# Patient Record
Sex: MALE | Race: WHITE | NOT HISPANIC OR LATINO | Employment: OTHER | URBAN - METROPOLITAN AREA
[De-identification: names, ages, dates, MRNs, and addresses within clinical notes are randomized per-mention and may not be internally consistent; named-entity substitution may affect disease eponyms.]

---

## 2017-02-28 ENCOUNTER — PATIENT OUTREACH (OUTPATIENT)
Dept: HEALTH INFORMATION MANAGEMENT | Facility: OTHER | Age: 77
End: 2017-02-28

## 2017-03-01 NOTE — PROGRESS NOTES
Attempt #:1     Annual Wellness Visit Scheduling  1. Scheduling Status:Scheduled          Care Gap Scheduling (Attempt to Schedule EACH Overdue Care Gap!)     Health Maintenance Due   Topic Date Due   • IMM DTaP/Tdap/Td Vaccine (1 - Tdap) SCHEDULED   • IMM ZOSTER VACCINE  SCHEDULED   • Annual Wellness Visit  SCHEDULED         MyChart Activation: already active

## 2017-03-13 ENCOUNTER — OFFICE VISIT (OUTPATIENT)
Dept: MEDICAL GROUP | Facility: MEDICAL CENTER | Age: 77
End: 2017-03-13
Payer: MEDICARE

## 2017-03-13 VITALS
TEMPERATURE: 98 F | WEIGHT: 199 LBS | SYSTOLIC BLOOD PRESSURE: 126 MMHG | HEIGHT: 74 IN | DIASTOLIC BLOOD PRESSURE: 82 MMHG | OXYGEN SATURATION: 95 % | HEART RATE: 78 BPM | BODY MASS INDEX: 25.54 KG/M2

## 2017-03-13 DIAGNOSIS — M25.551 BILATERAL HIP PAIN: ICD-10-CM

## 2017-03-13 DIAGNOSIS — Z95.0 BIVENTRICULAR CARDIAC PACEMAKER IN SITU: ICD-10-CM

## 2017-03-13 DIAGNOSIS — F51.01 PRIMARY INSOMNIA: ICD-10-CM

## 2017-03-13 DIAGNOSIS — I42.9 CARDIOMYOPATHY (HCC): ICD-10-CM

## 2017-03-13 DIAGNOSIS — Z00.00 MEDICARE ANNUAL WELLNESS VISIT, SUBSEQUENT: ICD-10-CM

## 2017-03-13 DIAGNOSIS — Z98.890 S/P THORACIC AORTIC ANEURYSM REPAIR: ICD-10-CM

## 2017-03-13 DIAGNOSIS — I10 ESSENTIAL HYPERTENSION, BENIGN: ICD-10-CM

## 2017-03-13 DIAGNOSIS — Z86.79 S/P THORACIC AORTIC ANEURYSM REPAIR: ICD-10-CM

## 2017-03-13 DIAGNOSIS — N18.30 CKD (CHRONIC KIDNEY DISEASE) STAGE 3, GFR 30-59 ML/MIN (HCC): ICD-10-CM

## 2017-03-13 DIAGNOSIS — I44.0 FIRST DEGREE ATRIOVENTRICULAR BLOCK: ICD-10-CM

## 2017-03-13 DIAGNOSIS — I49.3 PVCS (PREMATURE VENTRICULAR CONTRACTIONS): ICD-10-CM

## 2017-03-13 DIAGNOSIS — M25.552 BILATERAL HIP PAIN: ICD-10-CM

## 2017-03-13 DIAGNOSIS — Z85.828 H/O NONMELANOMA SKIN CANCER: ICD-10-CM

## 2017-03-13 DIAGNOSIS — J30.1 SEASONAL ALLERGIC RHINITIS DUE TO POLLEN: ICD-10-CM

## 2017-03-13 DIAGNOSIS — Z95.2 S/P AVR (AORTIC VALVE REPLACEMENT): ICD-10-CM

## 2017-03-13 DIAGNOSIS — R73.01 ELEVATED FASTING BLOOD SUGAR: ICD-10-CM

## 2017-03-13 DIAGNOSIS — Z85.46 H/O PROSTATE CANCER: ICD-10-CM

## 2017-03-13 DIAGNOSIS — I44.7 LBBB (LEFT BUNDLE BRANCH BLOCK): ICD-10-CM

## 2017-03-13 DIAGNOSIS — K21.9 GASTROESOPHAGEAL REFLUX DISEASE WITHOUT ESOPHAGITIS: ICD-10-CM

## 2017-03-13 DIAGNOSIS — R73.03 PREDIABETES: ICD-10-CM

## 2017-03-13 PROCEDURE — G0439 PPPS, SUBSEQ VISIT: HCPCS | Performed by: FAMILY MEDICINE

## 2017-03-13 PROCEDURE — G8510 SCR DEP NEG, NO PLAN REQD: HCPCS | Performed by: FAMILY MEDICINE

## 2017-03-13 PROCEDURE — 1036F TOBACCO NON-USER: CPT | Performed by: FAMILY MEDICINE

## 2017-03-13 ASSESSMENT — PATIENT HEALTH QUESTIONNAIRE - PHQ9: CLINICAL INTERPRETATION OF PHQ2 SCORE: 0

## 2017-03-13 NOTE — PROGRESS NOTES
Chief Complaint   Patient presents with   • Annual Exam         HPI:  Ross is a 77 y.o. here for Medicare Annual Wellness Visit     Patient Active Problem List    Diagnosis Date Noted   • H/O nonmelanoma skin cancer 11/27/2013     Priority: High   • H/O prostate cancer 03/13/2017   • Essential hypertension, benign 09/15/2016   • Cardiomyopathy (CMS-HCC) 09/01/2015   • CKD (chronic kidney disease) stage 3, GFR 30-59 ml/min 08/10/2015   • Bilateral hip pain 08/10/2015   • Biventricular cardiac pacemaker in situ 03/04/2015   • PVCs (premature ventricular contractions) 03/04/2015   • Prediabetes 02/12/2015   • First degree atrioventricular block 12/29/2014   • Seasonal allergies 10/17/2013   • GERD (gastroesophageal reflux disease) 10/17/2013   • Insomnia 10/17/2013   • LBBB (left bundle branch block) 05/08/2013   • S/P AVR (aortic valve replacement) 05/08/2013   • S/P thoracic aortic aneurysm repair 05/08/2013       Current Outpatient Prescriptions   Medication Sig Dispense Refill   • losartan (COZAAR) 50 MG Tab Take 1 Tab by mouth every day. 90 Tab 3   • omeprazole (PRILOSEC) 20 MG delayed-release capsule Take 1 Cap by mouth every day. 90 Cap 3   • metoprolol SR (TOPROL XL) 25 MG TABLET SR 24 HR Take 1 Tab by mouth ONE-HALF HOUR AFTER DINNER. 90 Tab 3   • fluticasone (FLONASE) 50 MCG/ACT nasal spray Spray 2 Sprays in nose every day. 1 Bottle 0   • Cholecalciferol (VITAMIN D) 400 UNIT TABS Take 400 Units by mouth every day.     • DiphenhydrAMINE HCl (BENADRYL ALLERGY PO) Take  by mouth as needed. As needed for sleep     • aspirin 81 MG tablet Take 81 mg by mouth every day.       No current facility-administered medications for this visit.            Current supplements as per medication list.       Allergies: Review of patient's allergies indicates no known allergies.    Current social contact/activities: Lives with wife. Son in [place] and daughter in [place].     He  reports that he has never smoked. He has never  used smokeless tobacco. He reports that he drinks about 2.5 oz of alcohol per week. He reports that he does not use illicit drugs.  Counseling given: Not Answered        DPA/Advanced directive: Patient does have an advanced directive. If not on file, instructed to bring in a copy to scan into their chart. If no advanced directive exists, a packet and workshop information was given on advanced directives.     ROS:    Gait: Uses no assistive device   Ostomy: no   Other tubes: no   Amputations: no   Chronic oxygen use no   Last eye exam: 1 month ago.   : Denies incontinence.       Depression Screening    Little interest or pleasure in doing things?  0 - not at all  Feeling down, depressed , or hopeless? 0 - not at all  Trouble falling or staying asleep, or sleeping too much?     Feeling tired or having little energy?     Poor appetite or overeating?     Feeling bad about yourself - or that you are a failure or have let yourself or your family down?    Trouble concentrating on things, such as reading the newspaper or watching television?    Moving or speaking so slowly that other people could have noticed.  Or the opposite - being so fidgety or restless that you have been moving around a lot more than usual?     Thoughts that you would be better off dead, or of hurting yourself?     Patient Health Questionnaire Score:      If depressive symptoms identified deferred to follow up visit unless specifically addressed in assesment and plan.      Screening for Cognitive Impairment    Three Minute Recall (banana, sunrise, fence)  3/3    Draw clock face with all 12 numbers set to the hand to show 10 minures past 11 o'clock  1    Cognitive concerns identified defferred for follow up unless specifically addressed in assesment and plan.    Fall Risk Assessment    Has the patient had two or more falls in the last year or any fall with injury in the last year?  No    Safety Assessment    Throw rugs on floor.  No  Handrails on all  stairs.  No  Good lighting in all hallways.  Yes  Difficulty hearing.  No  Patient counseled about all safety risks that were identified.    Functional Assessment ADLs    Are there any barriers preventing you from cooking for yourself or meeting nutritional needs?  No.    Are there any barriers preventing you from driving safely or obtaining transportation?  No.    Are there any barriers preventing you from using a telephone or calling for help?  No.    Are there any barriers preventing you from shopping?  No.    Are there any barriers preventing you from taking care of your own finances?  No.    Are there any barriers preventing you from managing your medications?  No.    Are currently engaing any exercise or physical activity?  Yes.  Walking    Health Maintenance Summary                IMM DTaP/Tdap/Td Vaccine Overdue 3/8/1958     IMM ZOSTER VACCINE Overdue 3/8/1999     Annual Wellness Visit Overdue 2016      Done 2015 Visit Dx: Medicare annual wellness visit, subsequent    COLONOSCOPY Next Due 2017      Done 2012 AMB REFERRAL TO GI FOR COLONOSCOPY          Patient Care Team:  Scottie Packer M.D. as PCP - General (Family Medicine)      Social History   Substance Use Topics   • Smoking status: Never Smoker    • Smokeless tobacco: Never Used   • Alcohol Use: 2.5 oz/week     5 Glasses of wine per week     Family History   Problem Relation Age of Onset   • Other Mother       at age 82. Parkinson's disease.   • Cancer Father       at age 68. Prostate cancer.   • Cancer Sister      Lung     He  has a past medical history of Hypertension ( ); LBBB (left bundle branch block) ( ); Prostate cancer (CMS-[facility]) ( ); S/P AVR (aortic valve replacement) (2000); S/P AVR (aortic valve replacement) (2013); S/P thoracic aortic aneurysm repair (2013); S/P prostatectomy (); Seasonal allergies (10/17/2013); GERD (gastroesophageal reflux disease) (10/17/2013); Insomnia (10/17/2013); Basal cell  "carcinoma of lip (11/27/2013); Heart burn; Indigestion; Bronchitis; Pneumonia; Breath shortness; Cancer (CMS-HCC) (2006); Cold (12/2014); PVCs (premature ventricular contractions) (3/4/2015); Positional lightheadedness (3/4/2015); Biventricular cardiac pacemaker in situ (3/4/2015); and MEDICAL HOME.   Past Surgical History   Procedure Laterality Date   • Other cardiac surgery  2000 and 2013     aVR. Thoracic aortic aneurysm repair.   • Prostatectomy, radial  2003.     prostate cancer.   • Appendectomy     • Aortic valve replacement  2/21/13     Bovine tissur valve with ascending aortic repair..  Done in [place].   • Cardiac cath  2/2013     normal coronaries prior to AVR.   • Other  2013     carcinoma removed from lip   • Recovery  12/29/2014     Performed by Cath-Recovery Surgery at SURGERY SAME DAY [facility] ORS       Exam:     Blood pressure 126/82, pulse 78, temperature 36.7 °C (98 °F), height 1.885 m (6' 2.21\"), weight 90.266 kg (199 lb), SpO2 95 %. Body mass index is 25.4 kg/(m^2).    Constitutional: Alert, no distress.  Skin: Warm, dry, good turgor, no rashes in visible areas.  Eye: Equal, round and reactive, conjunctiva clear, lids normal.  ENMT: Lips without lesions, good dentition, oropharynx clear.  Neck: Trachea midline, no masses, no thyromegaly. No cervical or supraclavicular lymphadenopathy  Respiratory: Unlabored respiratory effort, lungs clear to auscultation, no wheezes, no ronchi.  Cardiovascular: Normal S1, S2, no murmur, no edema.  Psych: Alert and oriented x3, normal affect and mood.        Assessment and Plan. The following treatment and monitoring plan is recommended:    1. Medicare annual wellness visit, subsequent  - Annual Wellness Visit - Includes PPPS Subsequent ()    2. LBBB (left bundle branch block)  Asymptomatic. Continue to follow up with cardiology.  - Annual Wellness Visit - Includes PPPS Subsequent ()    3. S/P AVR (aortic valve replacement)  Asymptomatic. Continue to " follow up with cardiology.  - Annual Wellness Visit - Includes PPPS Subsequent ()    4. S/P thoracic aortic aneurysm repair  Asymptomatic. Continue to follow up with cardiology.  - Annual Wellness Visit - Includes PPPS Subsequent ()    5. First degree atrioventricular block  Asymptomatic. Continue to follow up with cardiology.  - Annual Wellness Visit - Includes PPPS Subsequent ()    6. Biventricular cardiac pacemaker in situ  Asymptomatic. Continue to follow up with cardiology.  - Annual Wellness Visit - Includes PPPS Subsequent ()    7. PVCs (premature ventricular contractions)  Asymptomatic. Continue to follow up with cardiology.  - Annual Wellness Visit - Includes PPPS Subsequent ()    8. Cardiomyopathy (CMS-HCC)  Asymptomatic. Continue to follow up with cardiology.  - Annual Wellness Visit - Includes PPPS Subsequent ()    9. Essential hypertension, benign  Controlled. Continue current medication.  - CBC WITH DIFFERENTIAL; Future  - COMP METABOLIC PANEL; Future  - LIPID PROFILE; Future  - TSH WITH REFLEX TO FT4; Future  - Annual Wellness Visit - Includes PPPS Subsequent ()    10. Seasonal allergic rhinitis due to pollen  Controlled. Continue current medication.  - Annual Wellness Visit - Includes PPPS Subsequent ()    11. Gastroesophageal reflux disease without esophagitis  Controlled. Continue current medication.  - Annual Wellness Visit - Includes PPPS Subsequent ()    12. Primary insomnia  Controlled. Continue Benadryl  - Annual Wellness Visit - Includes PPPS Subsequent ()    13. H/O nonmelanoma skin cancer  No recurrence.  - Annual Wellness Visit - Includes PPPS Subsequent ()    14. Prediabetes  Stable. Advised healthy lifestyle.  - Annual Wellness Visit - Includes PPPS Subsequent ()    15. CKD (chronic kidney disease) stage 3, GFR 30-59 ml/min  Stable. Follow-up labs in 6 months and call with results.  - VITAMIN D,25 HYDROXY; Future  - Annual  Wellness Visit - Includes PPPS Subsequent ()    16. Bilateral hip pain  Stable and minimally symptomatic. Continue to monitor.  - Annual Wellness Visit - Includes PPPS Subsequent ()    17. H/O prostate cancer  - Annual Wellness Visit - Includes PPPS Subsequent ()    18. Elevated fasting blood sugar  Stable. Follow-up labs in 6 months and call with results.  - HEMOGLOBIN A1C; Future  - Annual Wellness Visit - Includes PPPS Subsequent ()        Services needed: as per orders if indicated.  Health Care Screening: Age-appropriate preventive services Medicare covers discussed today and ordered if indicated.    Referrals offered: Community-based lifestyle interventions to reduce health risks and promote self-management and wellness, fall prevention, nutrition, physical activity, tobacco-use cessation, weight loss, and mental health services as per orders if indicated.    Discussion today about general wellness and lifestyle habits:    · Prevent falls and reduce trip hazards; Cautioned about securing or removing rugs.  · Have a working fire alarm and carbon monoxide detector;   · Engage in regular physical activity and social activities       Follow-up: Return in about 1 year (around 3/13/2018), or if symptoms worsen or fail to improve, for Annual, Long.

## 2017-03-13 NOTE — MR AVS SNAPSHOT
"        Ross Mcneal   3/13/2017 3:20 PM   Office Visit   MRN: [id number]    Department:  [facility]   Dept Phone:  [phone]    Description:  Male : 1940   Provider:  Scottie Packer M.D.           Reason for Visit     Annual Exam           Allergies as of 3/13/2017     No Known Allergies      You were diagnosed with     Medicare annual wellness visit, subsequent   [878793]       LBBB (left bundle branch block)   [097420]       S/P AVR (aortic valve replacement)   [525705]       S/P thoracic aortic aneurysm repair   [663801]       First degree atrioventricular block   [426.11.ICD-9-CM]       Biventricular cardiac pacemaker in situ   [847270]       PVCs (premature ventricular contractions)   [067305]       Cardiomyopathy (CMS-HCC)   [2179981]       Essential hypertension, benign   [401.1.ICD-9-CM]       Seasonal allergic rhinitis due to pollen   [4764632]       Gastroesophageal reflux disease without esophagitis   [511122]       Primary insomnia   [484123]       H/O nonmelanoma skin cancer   [783618]       Prediabetes   [222663]       CKD (chronic kidney disease) stage 3, GFR 30-59 ml/min   [058919]       Bilateral hip pain   [237533]       H/O prostate cancer   [796900]       Elevated fasting blood sugar   [517868]         Vital Signs     Blood Pressure Pulse Temperature Height Weight Body Mass Index    126/82 mmHg 78 36.7 °C (98 °F) 1.885 m (6' 2.21\") 90.266 kg (199 lb) 25.40 kg/m2    Oxygen Saturation Smoking Status                95% Never Smoker           Basic Information     Date Of Birth Sex Race Ethnicity Preferred Language    1940 Male White Non- English      Your appointments     Mar 16, 2017  9:15 AM   PACER CHECK ONLY with PACER CHECK-[facility]   [facility] (--)    [address], [address]  [place] [id number] [phone]            Mar 16, 2017 10:00 AM   FOLLOW UP with Rashard Conte M.D.   [facility] (--)    [address], [address]  [place] [address] [phone]              Problem List              ICD-10-CM Priority Class Noted - Resolved    LBBB (left bundle branch block) I44.7   5/8/2013 - Present    S/P AVR (aortic valve replacement) Z95.2   5/8/2013 - Present    S/P thoracic aortic aneurysm repair Z98.890, Z86.79   5/8/2013 - Present    Seasonal allergies J30.2   10/17/2013 - Present    GERD (gastroesophageal reflux disease) K21.9   10/17/2013 - Present    Insomnia G47.00   10/17/2013 - Present    H/O nonmelanoma skin cancer Z85.828 High  11/27/2013 - Present    First degree atrioventricular block I44.0   12/29/2014 - Present    Prediabetes R73.03   2/12/2015 - Present    Biventricular cardiac pacemaker in situ Z95.0   3/4/2015 - Present    PVCs (premature ventricular contractions) I49.3   3/4/2015 - Present    CKD (chronic kidney disease) stage 3, GFR 30-59 ml/min N18.3   8/10/2015 - Present    Bilateral hip pain M25.551, M25.552   8/10/2015 - Present    Cardiomyopathy (CMS-HCC) I42.9   9/1/2015 - Present    Essential hypertension, benign I10   9/15/2016 - Present    H/O prostate cancer Z85.46   3/13/2017 - Present      Health Maintenance        Date Due Completion Dates    IMM DTaP/Tdap/Td Vaccine (1 - Tdap) 3/8/1958 ---    IMM ZOSTER VACCINE 3/8/1999 ---    COLONOSCOPY 12/4/2017 12/4/2012            Current Immunizations     13-VALENT PCV PREVNAR 7/11/2016    Influenza TIV (IM) 10/12/2016, 10/15/2014    Pneumococcal polysaccharide vaccine (PPSV-23) 12/29/2010      Below and/or attached are the medications your provider expects you to take. Review all of your home medications and newly ordered medications with your provider and/or pharmacist. Follow medication instructions as directed by your provider and/or pharmacist. Please keep your medication list with you and share with your provider. Update the information when medications are discontinued, doses are changed, or new  medications (including over-the-counter products) are added; and carry medication information at all times in the event of emergency situations     Allergies:  No Known Allergies          Medications  Valid as of: March 13, 2017 -  3:34 PM    Generic Name Brand Name Tablet Size Instructions for use    Aspirin (Tab) aspirin 81 MG Take 81 mg by mouth every day.        Cholecalciferol (Tab) VITAMIN D 400 UNIT Take 400 Units by mouth every day.        DiphenhydrAMINE HCl   Take  by mouth as needed. As needed for sleep        Fluticasone Propionate (Suspension) FLONASE 50 MCG/ACT Spray 2 Sprays in nose every day.        Losartan Potassium (Tab) COZAAR 50 MG Take 1 Tab by mouth every day.        Metoprolol Succinate (TABLET SR 24 HR) TOPROL XL 25 MG Take 1 Tab by mouth ONE-HALF HOUR AFTER DINNER.        Omeprazole (CAPSULE DELAYED RELEASE) PRILOSEC 20 MG Take 1 Cap by mouth every day.        .                 Medicines prescribed today were sent to:     [facility] - [address] [facility] [address]    Phone: [phone] Fax: [phone]    Open 24 Hours?: No    [facility] [address] [place] [address] AT [address] [address]    Phone: [phone] Fax: [phone]    Open 24 Hours?: No      Medication refill instructions:       If your prescription bottle indicates you have medication refills left, it is not necessary to call your provider’s office. Please contact your pharmacy and they will refill your medication.    If your prescription bottle indicates you do not have any refills left, you may request refills at any time through one of the following ways: The online [employer] system (except Urgent Care), by calling your provider’s office, or by asking your pharmacy to contact your provider’s office with a refill request. Medication refills are processed only during regular  business hours and may not be available until the next business day. Your provider may request additional information or to have a follow-up visit with you prior to refilling your medication.   *Please Note: Medication refills are assigned a new Rx number when refilled electronically. Your pharmacy may indicate that no refills were authorized even though a new prescription for the same medication is available at the pharmacy. Please request the medicine by name with the pharmacy before contacting your provider for a refill.        Your To Do List     Future Labs/Procedures Complete By Expires    CBC WITH DIFFERENTIAL  As directed 3/14/2018    COMP METABOLIC PANEL  As directed 3/14/2018    HEMOGLOBIN A1C  As directed 3/14/2018    LIPID PROFILE  As directed 3/14/2018    TSH WITH REFLEX TO FT4  As directed 3/13/2018    VITAMIN D,25 HYDROXY  As directed 3/14/2018      Other Notes About Your Plan     Enrolled in RNEXAGEMed Team with Dr. Birdie May Access Code: Activation code not generated  Current [employer] Status: Active

## 2017-03-16 ENCOUNTER — OFFICE VISIT (OUTPATIENT)
Dept: CARDIOLOGY | Facility: MEDICAL CENTER | Age: 77
End: 2017-03-16
Payer: MEDICARE

## 2017-03-16 ENCOUNTER — NON-PROVIDER VISIT (OUTPATIENT)
Dept: CARDIOLOGY | Facility: MEDICAL CENTER | Age: 77
End: 2017-03-16
Payer: MEDICARE

## 2017-03-16 VITALS
HEIGHT: 74 IN | BODY MASS INDEX: 25.03 KG/M2 | WEIGHT: 195 LBS | DIASTOLIC BLOOD PRESSURE: 62 MMHG | SYSTOLIC BLOOD PRESSURE: 130 MMHG | HEART RATE: 79 BPM | OXYGEN SATURATION: 92 %

## 2017-03-16 DIAGNOSIS — I34.1 MVP (MITRAL VALVE PROLAPSE): ICD-10-CM

## 2017-03-16 DIAGNOSIS — I42.9 CARDIOMYOPATHY (HCC): ICD-10-CM

## 2017-03-16 DIAGNOSIS — Z98.890 S/P THORACIC AORTIC ANEURYSM REPAIR: ICD-10-CM

## 2017-03-16 DIAGNOSIS — Z86.79 S/P THORACIC AORTIC ANEURYSM REPAIR: ICD-10-CM

## 2017-03-16 DIAGNOSIS — I34.0 MITRAL VALVE INSUFFICIENCY, UNSPECIFIED ETIOLOGY: ICD-10-CM

## 2017-03-16 DIAGNOSIS — Z95.0 BIVENTRICULAR CARDIAC PACEMAKER IN SITU: ICD-10-CM

## 2017-03-16 DIAGNOSIS — Z95.2 S/P AVR (AORTIC VALVE REPLACEMENT): ICD-10-CM

## 2017-03-16 PROCEDURE — 93281 PM DEVICE PROGR EVAL MULTI: CPT | Performed by: INTERNAL MEDICINE

## 2017-03-16 PROCEDURE — 1036F TOBACCO NON-USER: CPT | Performed by: INTERNAL MEDICINE

## 2017-03-16 PROCEDURE — G8420 CALC BMI NORM PARAMETERS: HCPCS | Performed by: INTERNAL MEDICINE

## 2017-03-16 PROCEDURE — G8432 DEP SCR NOT DOC, RNG: HCPCS | Performed by: INTERNAL MEDICINE

## 2017-03-16 PROCEDURE — 4040F PNEUMOC VAC/ADMIN/RCVD: CPT | Performed by: INTERNAL MEDICINE

## 2017-03-16 PROCEDURE — 99214 OFFICE O/P EST MOD 30 MIN: CPT | Mod: 25 | Performed by: INTERNAL MEDICINE

## 2017-03-16 PROCEDURE — 1101F PT FALLS ASSESS-DOCD LE1/YR: CPT | Performed by: INTERNAL MEDICINE

## 2017-03-16 PROCEDURE — G8482 FLU IMMUNIZE ORDER/ADMIN: HCPCS | Performed by: INTERNAL MEDICINE

## 2017-03-16 ASSESSMENT — ENCOUNTER SYMPTOMS
PND: 0
BRUISES/BLEEDS EASILY: 0
SHORTNESS OF BREATH: 0
LOSS OF CONSCIOUSNESS: 0
ORTHOPNEA: 0
PALPITATIONS: 0
MYALGIAS: 0
DIZZINESS: 0

## 2017-03-16 NOTE — MR AVS SNAPSHOT
"        Ross Mcneal   3/16/2017 10:00 AM   Office Visit   MRN: [id number]    Department:  [facility]   Dept Phone:  [phone]    Description:  Male : 1940   Provider:  Rashard Conte M.D.           Reason for Visit     Follow-Up           Allergies as of 3/16/2017     No Known Allergies      You were diagnosed with     S/P AVR (aortic valve replacement)   [948522]       S/P thoracic aortic aneurysm repair   [537402]       Cardiomyopathy (CMS-HCC)   [1697591]       Biventricular cardiac pacemaker in situ   [029342]       CKD (chronic kidney disease) stage 3, GFR 30-59 ml/min   [807032]       MVP (mitral valve prolapse)   [651519]       Mitral valve insufficiency, unspecified etiology   [3722147]         Vital Signs     Blood Pressure Pulse Height Weight Body Mass Index Oxygen Saturation    130/62 mmHg 79 1.885 m (6' 2.21\") 88.451 kg (195 lb) 24.89 kg/m2 92%    Smoking Status                   Never Smoker            Basic Information     Date Of Birth Sex Race Ethnicity Preferred Language    1940 Male White Non- English      Problem List              ICD-10-CM Priority Class Noted - Resolved    LBBB (left bundle branch block) I44.7   2013 - Present    S/P AVR (aortic valve replacement) Z95.2   2013 - Present    S/P thoracic aortic aneurysm repair Z98.890, Z86.79   2013 - Present    Seasonal allergies J30.2   10/17/2013 - Present    GERD (gastroesophageal reflux disease) K21.9   10/17/2013 - Present    Insomnia G47.00   10/17/2013 - Present    H/O nonmelanoma skin cancer Z85.828 High  2013 - Present    First degree atrioventricular block I44.0   2014 - Present    Prediabetes R73.03   2015 - Present    Biventricular cardiac pacemaker in situ Z95.0   3/4/2015 - Present    PVCs (premature ventricular contractions) I49.3   3/4/2015 - Present    CKD (chronic kidney disease) stage 3, GFR 30-59 ml/min N18.3   8/10/2015 - Present    Bilateral hip pain " M25.551, M25.552   8/10/2015 - Present    Cardiomyopathy (CMS-HCC) I42.9   9/1/2015 - Present    Essential hypertension, benign I10   9/15/2016 - Present    H/O prostate cancer Z85.46   3/13/2017 - Present    MVP (mitral valve prolapse) I34.1   3/16/2017 - Present    Mitral regurgitation I34.0   3/16/2017 - Present      Health Maintenance        Date Due Completion Dates    IMM DTaP/Tdap/Td Vaccine (1 - Tdap) 3/8/1958 ---    IMM ZOSTER VACCINE 3/8/1999 ---    COLONOSCOPY 12/4/2017 12/4/2012            Current Immunizations     13-VALENT PCV PREVNAR 7/11/2016    Influenza TIV (IM) 10/12/2016, 10/15/2014    Pneumococcal polysaccharide vaccine (PPSV-23) 12/29/2010      Below and/or attached are the medications your provider expects you to take. Review all of your home medications and newly ordered medications with your provider and/or pharmacist. Follow medication instructions as directed by your provider and/or pharmacist. Please keep your medication list with you and share with your provider. Update the information when medications are discontinued, doses are changed, or new medications (including over-the-counter products) are added; and carry medication information at all times in the event of emergency situations     Allergies:  No Known Allergies          Medications  Valid as of: March 16, 2017 - 10:28 AM    Generic Name Brand Name Tablet Size Instructions for use    Aspirin (Tab) aspirin 81 MG Take 81 mg by mouth every day.        Cetirizine HCl (Cap) Cetirizine HCl 10 MG Take  by mouth.        Cholecalciferol (Tab) VITAMIN D 400 UNIT Take 400 Units by mouth every day.        DiphenhydrAMINE HCl   Take  by mouth as needed. As needed for sleep        Fluticasone Propionate (Suspension) FLONASE 50 MCG/ACT Spray 2 Sprays in nose every day.        Losartan Potassium (Tab) COZAAR 50 MG Take 1 Tab by mouth every day.        Metoprolol Succinate (TABLET SR 24 HR) TOPROL XL 25 MG Take 1 Tab by mouth ONE-HALF HOUR AFTER  DINNER.        Omeprazole (CAPSULE DELAYED RELEASE) PRILOSEC 20 MG Take 1 Cap by mouth every day.        .                 Medicines prescribed today were sent to:     [facility] - [address]    [address] [place] [address]    Phone: [phone] Fax: [phone]    Open 24 Hours?: No    [facility] [id number] [place] [address], [place] - [address] AT [facility] [address]    [address] [place] [address]    Phone: [phone] Fax: [phone]    Open 24 Hours?: No      Medication refill instructions:       If your prescription bottle indicates you have medication refills left, it is not necessary to call your provider’s office. Please contact your pharmacy and they will refill your medication.    If your prescription bottle indicates you do not have any refills left, you may request refills at any time through one of the following ways: The online [employer] system (except Urgent Care), by calling your provider’s office, or by asking your pharmacy to contact your provider’s office with a refill request. Medication refills are processed only during regular business hours and may not be available until the next business day. Your provider may request additional information or to have a follow-up visit with you prior to refilling your medication.   *Please Note: Medication refills are assigned a new Rx number when refilled electronically. Your pharmacy may indicate that no refills were authorized even though a new prescription for the same medication is available at the pharmacy. Please request the medicine by name with the pharmacy before contacting your provider for a refill.        Other Notes About Your Plan     Enrolled in [employer] Team with Dr. Birdie May Access Code: Activation code not generated  Current PA Semi Status: Active

## 2017-03-16 NOTE — Clinical Note
[facility]   [address] [facility] [address]  [place] [address]  Phone: [phone]  Fax: [phone]              Ross Mcneal  1940    Encounter Date: 3/16/2017    Rashard Conte M.D.          PROGRESS NOTE:  Subjective:   Ross Mcneal is a 77 y.o. 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    Last seen on 9/15/2016.    Since 9/15/2016 appointment no cardiac symptoms.  Continues to stay physically active without any difficulty.  Plans a cruise in April.    Between 12/29/2014-12/30/2014 hospitalized [facility].  Successfully underwent biventricular pacemaker implantation by Dr. Jam Lacy.  Indications were syncope/collapse with first degree AV block and a left bundle branch block.  Has recovered well.  Biggest improvement the patient states that first, no more syncope second, no more positional dizziness, lightheadedness or near syncope when he gets up from a sitting position.  Has had chronic palpitations with skipping beats.  Has not changed.  Usually picks up on his blood pressure monitored.    Monitors his blood pressure daily and his blood pressure log indicates excellent control of his blood pressure.    Past Medical History  November, 2014 while in [place] at a conference, the patient was walking down the hotel hallway carrying some milk and the next thing he remembers he was on the floor looking up.  Had no injury.  No warning symptoms.  Did not seek medical attention.  After returning home he noticed his blood pressure was elevated at 185 systolic.  Contacted the on-call doctor. Diovan was increased from 80 to 160 mg daily in addition to HCTZ.  He has had some positional  "lightheadedness.  His carefully monitored his blood pressure.    The patient his wife recently moved to [place] in October, 2012.  The patient went back to [place], [place] undergo redo AVR and ascending aortic aneurysm repair at [facility] in [facility] on 2/21/2013.  Preoperative cardiac catheterization demonstrated that his coronary arteries \"were all patent\".  Postoperatively as well.  Had postoperative atrial fibrillation on amiodarone which as been discontinued.     Social History     Social History   • Marital Status:      Spouse Name: N/A   • Number of Children: N/A   • Years of Education: N/A     Occupational History   • Not on file.     Social History Main Topics   • Smoking status: Never Smoker    • Smokeless tobacco: Never Used   • Alcohol Use: 2.5 oz/week     5 Glasses of wine per week   • Drug Use: No   • Sexual Activity:     Partners: Female     Other Topics Concern   • Not on file     Social History Narrative    Retired . . Moved to [place] in October, 2012.       Past Medical History   Diagnosis Date   • Hypertension     • LBBB (left bundle branch block)     • Prostate cancer (CMS-HCC)     • S/P AVR (aortic valve replacement) 1/2000     [place], [place]   • S/P AVR (aortic valve replacement) 2/21/2013     redo; [place], [place]   • S/P thoracic aortic aneurysm repair 2/21/2013   • S/P prostatectomy 2003   • Seasonal allergies 10/17/2013   • GERD (gastroesophageal reflux disease) 10/17/2013   • Insomnia 10/17/2013   • Basal cell carcinoma of lip 11/27/2013   • Heart burn    • Indigestion    • Bronchitis    • Pneumonia    • Breath shortness    • Cancer (CMS-HCC) 2006     prostate   • Cold 12/2014     sinus infection; no antibiotics   • PVCs (premature ventricular contractions) 3/4/2015   • Positional lightheadedness 3/4/2015   • Biventricular cardiac pacemaker in situ 3/4/2015   • MEDICAL HOME   "     Past Surgical History   Procedure Laterality Date   • Other cardiac surgery   and      aVR. Thoracic aortic aneurysm repair.   • Prostatectomy, radial  .     prostate cancer.   • Appendectomy     • Aortic valve replacement  13     Bovine tissur valve with ascending aortic repair..  Done in [place].   • Cardiac cath  2013     normal coronaries prior to AVR.   • Other       carcinoma removed from lip   • Recovery  2014     Performed by Cath-Recovery Surgery at SURGERY SAME DAY [facility]     Family History   Problem Relation Age of Onset   • Other Mother       at age 82. Parkinson's disease.   • Cancer Father       at age 68. Prostate cancer.   • Cancer Sister      Lung     History   Smoking status   • Never Smoker    Smokeless tobacco   • Never Used     No Known Allergies  Outpatient Encounter Prescriptions as of 3/16/2017   Medication Sig Dispense Refill   • Cetirizine HCl (ZYRTEC ALLERGY) 10 MG Cap Take  by mouth.     • losartan (COZAAR) 50 MG Tab Take 1 Tab by mouth every day. 90 Tab 3   • omeprazole (PRILOSEC) 20 MG delayed-release capsule Take 1 Cap by mouth every day. 90 Cap 3   • metoprolol SR (TOPROL XL) 25 MG TABLET SR 24 HR Take 1 Tab by mouth ONE-HALF HOUR AFTER DINNER. 90 Tab 3   • fluticasone (FLONASE) 50 MCG/ACT nasal spray Spray 2 Sprays in nose every day. 1 Bottle 0   • Cholecalciferol (VITAMIN D) 400 UNIT TABS Take 400 Units by mouth every day.     • DiphenhydrAMINE HCl (BENADRYL ALLERGY PO) Take  by mouth as needed. As needed for sleep     • aspirin 81 MG tablet Take 81 mg by mouth every day.       No facility-administered encounter medications on file as of 3/16/2017.     Review of Systems   Respiratory: Negative for shortness of breath.    Cardiovascular: Negative for chest pain, palpitations, orthopnea, leg swelling and PND.   Musculoskeletal: Negative for myalgias.   Neurological: Negative for dizziness and loss of consciousness.  "  Endo/Heme/Allergies: Does not bruise/bleed easily.        Objective:   /62 mmHg  Pulse 79  Ht 1.885 m (6' 2.21\")  Wt 88.451 kg (195 lb)  BMI 24.89 kg/m2  SpO2 92%    Physical Exam   Constitutional: He is oriented to person, place, and time. He appears well-nourished. No distress.   HENT:   Head: Normocephalic.   Neck: No JVD present.   Normal jugular venous pressure.   Cardiovascular: Normal rate, S1 normal and S2 normal.  An irregular rhythm present. Exam reveals no gallop and no friction rub.    Murmur heard.   Systolic murmur is present with a grade of 2/6   Pulses:       Carotid pulses are 2+ on the right side, and 2+ on the left side.       Radial pulses are 2+ on the right side, and 2+ on the left side.        Femoral pulses are 2+ on the right side, and 2+ on the left side.       Posterior tibial pulses are 2+ on the right side, and 2+ on the left side.       Pulmonary/Chest: Effort normal. He has no wheezes. He has rhonchi. He has no rales.   Healed midline scar.      Abdominal: Soft. Bowel sounds are normal. He exhibits no abdominal bruit, no pulsatile midline mass and no mass. There is no hepatosplenomegaly. There is no tenderness.       Musculoskeletal: He exhibits no edema.   Neurological: He is alert and oriented to person, place, and time.   Skin: Skin is warm and dry. No cyanosis. Nails show no clubbing.   Psychiatric: He has a normal mood and affect. His behavior is normal.   02/27/2013 ECHOCARDIOGRAM  EF 30-40%. Aortic valve prosthesis. 1-2+ MR. 2+ TR.    12/12/2014 ECHOCARDIOGRAM  Pt has a prosthetic aortic valve with normal motion.  Transvalvular gradients are - Peak: 20 mmHg Mean: 8 mmHg.  Myxomatous change with prolapse of the both mitral leaflet was present.  Eccentric, more than one jets of mild to moderate mitral regurgitation.  Mild to moderately dilated left atrium.  Normal left ventricular chamber size.  Mild concentric left ventricular hypertrophy.  Difficult to determine " systolic function due to irregular heart rhythm   but appeared preserved.  Septal bounces probably from conduction abnormality (LBBB).  Normal right ventricular systolic function.  Unable to estimate pulmonary artery pressure due to an inadequate   tricuspid regurgitant jet.    11/11/2015 ECHOCARDIOGRAM  Bovine bioprosthetic aortic valve; normal function.  Normal left ventricular systolic function.  Left ventricular ejection fraction is visually estimated to be 70%.  Asymmetric septal hypertrophy.  Mitral valve prolapse of anterior and posterior leaflets with   myxomatous changes.  Moderate eccentric mitral regurgitation.  Right heart pressures are normal.    05/08/2013 EKG: Normal sinus rhythm, rate 67. First degree AV block. Left bundle branch block. Unchanged since 2/22/2013.    12/08/2014 EKG: Normal sinus rhythm, rate 92. First degree AV block. Left bundle branch block. Intermittent left anterior hemiblock. Isolated PVCs.    Assessment:     1. S/P AVR (aortic valve replacement)     2. S/P thoracic aortic aneurysm repair     3. Cardiomyopathy (CMS-HCC)     4. Biventricular cardiac pacemaker in situ     5. MVP (mitral valve prolapse)     6. Mitral valve insufficiency, unspecified etiology         Medical Decision Making:  Today's Assessment / Status / Plan:     Cardiomyopathy. Resolved.    Mitral regurgitation. Moderate.     Myxomatous mitral valve disease.    Syncope. 2014. No recurrence.    Biventricular pacemaker. 12/29/2014. Syncope unknown etiology. Functioning normally.    Palpitations. Chronic. Related to PVCs now controlled.    PVCs. Chronic.    S/P redo AVR/ascending aortic aneurysm repair. 2/22/2013. [facility]. Severe aortic insufficiency. Clinically stable.    AVR. 2000. Critical aortic stenosis. [place], [place].    Hypertension. Blood pressure controlled on outpatient measurements.    LBBB.  Chronic.    Recommendation/Discussion  Continue current therapy.  Echocardiogram next appointment.  Followup 6 months.         Scottie Packer M.D.  [address]  [place] [address]  VIA In Basket

## 2017-03-16 NOTE — PROGRESS NOTES
Subjective:   Ross Mcneal is a 77 y.o. 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    Last seen on 9/15/2016.    Since 9/15/2016 appointment no cardiac symptoms.  Continues to stay physically active without any difficulty.  Plans a cruise in April.    Between 12/29/2014-12/30/2014 hospitalized [facility].  Successfully underwent biventricular pacemaker implantation by Dr. Jam Lacy.  Indications were syncope/collapse with first degree AV block and a left bundle branch block.  Has recovered well.  Biggest improvement the patient states that first, no more syncope second, no more positional dizziness, lightheadedness or near syncope when he gets up from a sitting position.  Has had chronic palpitations with skipping beats.  Has not changed.  Usually picks up on his blood pressure monitored.    Monitors his blood pressure daily and his blood pressure log indicates excellent control of his blood pressure.    Past Medical History  November, 2014 while in [place] at a conference, the patient was walking down the [place] hallway carrying some milk and the next thing he remembers he was on the floor looking up.  Had no injury.  No warning symptoms.  Did not seek medical attention.  After returning home he noticed his blood pressure was elevated at 185 systolic.  Contacted the on-call doctor. Diovan was increased from 80 to 160 mg daily in addition to HCTZ.  He has had some positional lightheadedness.  His carefully monitored his blood pressure.    The patient his wife recently moved to [place] in October, 2012.  The patient went back to [place], [place] undergo redo AVR and ascending aortic aneurysm repair at [facility] in [place]  [place] on 2/21/2013.  Preoperative cardiac catheterization demonstrated that his coronary arteries \"were all patent\".  Postoperatively as well.  Had postoperative atrial fibrillation on amiodarone which as been discontinued.     Social History     Social History   • Marital Status:      Spouse Name: N/A   • Number of Children: N/A   • Years of Education: N/A     Occupational History   • Not on file.     Social History Main Topics   • Smoking status: Never Smoker    • Smokeless tobacco: Never Used   • Alcohol Use: 2.5 oz/week     5 Glasses of wine per week   • Drug Use: No   • Sexual Activity:     Partners: Female     Other Topics Concern   • Not on file     Social History Narrative    Retired . . Moved to [place] in October, 2012.       Past Medical History   Diagnosis Date   • Hypertension     • LBBB (left bundle branch block)     • Prostate cancer (CMS-[facility])     • S/P AVR (aortic valve replacement) 1/2000     [place], [place]   • S/P AVR (aortic valve replacement) 2/21/2013     redo; [place]   • S/P thoracic aortic aneurysm repair 2/21/2013   • S/P prostatectomy 2003   • Seasonal allergies 10/17/2013   • GERD (gastroesophageal reflux disease) 10/17/2013   • Insomnia 10/17/2013   • Basal cell carcinoma of lip 11/27/2013   • Heart burn    • Indigestion    • Bronchitis    • Pneumonia    • Breath shortness    • Cancer (CMS-[facility]) 2006     prostate   • Cold 12/2014     sinus infection; no antibiotics   • PVCs (premature ventricular contractions) 3/4/2015   • Positional lightheadedness 3/4/2015   • Biventricular cardiac pacemaker in situ 3/4/2015   • [facility]      Past Surgical History   Procedure Laterality Date   • Other cardiac surgery  2000 and 2013     aVR. Thoracic aortic aneurysm repair.   • Prostatectomy, radial  2003.     prostate cancer.   • Appendectomy     • Aortic valve replacement  2/21/13     Bovine tissur valve " "with ascending aortic repair..  Done in [place].   • Cardiac cath  2013     normal coronaries prior to AVR.   • Other       carcinoma removed from lip   • Recovery  2014     Performed by Cath-Recovery Surgery at SURGERY SAME DAY [facility]     Family History   Problem Relation Age of Onset   • Other Mother       at age 82. Parkinson's disease.   • Cancer Father       at age 68. Prostate cancer.   • Cancer Sister      Lung     History   Smoking status   • Never Smoker    Smokeless tobacco   • Never Used     No Known Allergies  Outpatient Encounter Prescriptions as of 3/16/2017   Medication Sig Dispense Refill   • Cetirizine HCl (ZYRTEC ALLERGY) 10 MG Cap Take  by mouth.     • losartan (COZAAR) 50 MG Tab Take 1 Tab by mouth every day. 90 Tab 3   • omeprazole (PRILOSEC) 20 MG delayed-release capsule Take 1 Cap by mouth every day. 90 Cap 3   • metoprolol SR (TOPROL XL) 25 MG TABLET SR 24 HR Take 1 Tab by mouth ONE-HALF HOUR AFTER DINNER. 90 Tab 3   • fluticasone (FLONASE) 50 MCG/ACT nasal spray Spray 2 Sprays in nose every day. 1 Bottle 0   • Cholecalciferol (VITAMIN D) 400 UNIT TABS Take 400 Units by mouth every day.     • DiphenhydrAMINE HCl (BENADRYL ALLERGY PO) Take  by mouth as needed. As needed for sleep     • aspirin 81 MG tablet Take 81 mg by mouth every day.       No facility-administered encounter medications on file as of 3/16/2017.     Review of Systems   Respiratory: Negative for shortness of breath.    Cardiovascular: Negative for chest pain, palpitations, orthopnea, leg swelling and PND.   Musculoskeletal: Negative for myalgias.   Neurological: Negative for dizziness and loss of consciousness.   Endo/Heme/Allergies: Does not bruise/bleed easily.        Objective:   /62 mmHg  Pulse 79  Ht 1.885 m (6' 2.21\")  Wt 88.451 kg (195 lb)  BMI 24.89 kg/m2  SpO2 92%    Physical Exam   Constitutional: He is oriented to person, place, and time. He appears well-nourished. No " distress.   HENT:   Head: Normocephalic.   Neck: No JVD present.   Normal jugular venous pressure.   Cardiovascular: Normal rate, S1 normal and S2 normal.  An irregular rhythm present. Exam reveals no gallop and no friction rub.    Murmur heard.   Systolic murmur is present with a grade of 2/6   Pulses:       Carotid pulses are 2+ on the right side, and 2+ on the left side.       Radial pulses are 2+ on the right side, and 2+ on the left side.        Femoral pulses are 2+ on the right side, and 2+ on the left side.       Posterior tibial pulses are 2+ on the right side, and 2+ on the left side.       Pulmonary/Chest: Effort normal. He has no wheezes. He has rhonchi. He has no rales.   Healed midline scar.      Abdominal: Soft. Bowel sounds are normal. He exhibits no abdominal bruit, no pulsatile midline mass and no mass. There is no hepatosplenomegaly. There is no tenderness.       Musculoskeletal: He exhibits no edema.   Neurological: He is alert and oriented to person, place, and time.   Skin: Skin is warm and dry. No cyanosis. Nails show no clubbing.   Psychiatric: He has a normal mood and affect. His behavior is normal.   02/27/2013 ECHOCARDIOGRAM  EF 30-40%. Aortic valve prosthesis. 1-2+ MR. 2+ TR.    12/12/2014 ECHOCARDIOGRAM  Pt has a prosthetic aortic valve with normal motion.  Transvalvular gradients are - Peak: 20 mmHg Mean: 8 mmHg.  Myxomatous change with prolapse of the both mitral leaflet was present.  Eccentric, more than one jets of mild to moderate mitral regurgitation.  Mild to moderately dilated left atrium.  Normal left ventricular chamber size.  Mild concentric left ventricular hypertrophy.  Difficult to determine systolic function due to irregular heart rhythm   but appeared preserved.  Septal bounces probably from conduction abnormality (LBBB).  Normal right ventricular systolic function.  Unable to estimate pulmonary artery pressure due to an inadequate   tricuspid regurgitant  jet.    11/11/2015 ECHOCARDIOGRAM  Bovine bioprosthetic aortic valve; normal function.  Normal left ventricular systolic function.  Left ventricular ejection fraction is visually estimated to be 70%.  Asymmetric septal hypertrophy.  Mitral valve prolapse of anterior and posterior leaflets with   myxomatous changes.  Moderate eccentric mitral regurgitation.  Right heart pressures are normal.    05/08/2013 EKG: Normal sinus rhythm, rate 67. First degree AV block. Left bundle branch block. Unchanged since 2/22/2013.    12/08/2014 EKG: Normal sinus rhythm, rate 92. First degree AV block. Left bundle branch block. Intermittent left anterior hemiblock. Isolated PVCs.    Assessment:     1. S/P AVR (aortic valve replacement)     2. S/P thoracic aortic aneurysm repair     3. Cardiomyopathy (CMS-HCC)     4. Biventricular cardiac pacemaker in situ     5. MVP (mitral valve prolapse)     6. Mitral valve insufficiency, unspecified etiology         Medical Decision Making:  Today's Assessment / Status / Plan:     Cardiomyopathy. Resolved.    Mitral regurgitation. Moderate.     Myxomatous mitral valve disease.    Syncope. 2014. No recurrence.    Biventricular pacemaker. 12/29/2014. Syncope unknown etiology. Functioning normally.    Palpitations. Chronic. Related to PVCs now controlled.    PVCs. Chronic.    S/P redo AVR/ascending aortic aneurysm repair. 2/22/2013. [facility]. Severe aortic insufficiency. Clinically stable.    AVR. 2000. Critical aortic stenosis. [place], [place].    Hypertension. Blood pressure controlled on outpatient measurements.    LBBB. Chronic.    Recommendation/Discussion  Continue current therapy.  Echocardiogram next appointment.  Followup 6 months.

## 2017-05-29 RX ORDER — OMEPRAZOLE 20 MG/1
CAPSULE, DELAYED RELEASE ORAL
Qty: 90 CAP | Refills: 3 | Status: SHIPPED | OUTPATIENT
Start: 2017-05-29 | End: 2018-03-10 | Stop reason: SDUPTHER

## 2017-05-29 RX ORDER — LOSARTAN POTASSIUM 50 MG/1
TABLET ORAL
Qty: 90 TAB | Refills: 3 | Status: SHIPPED | OUTPATIENT
Start: 2017-05-29 | End: 2018-04-28 | Stop reason: SDUPTHER

## 2017-06-17 RX ORDER — METOPROLOL SUCCINATE 25 MG/1
TABLET, EXTENDED RELEASE ORAL
Qty: 90 TAB | Refills: 3 | Status: SHIPPED | OUTPATIENT
Start: 2017-06-17 | End: 2018-06-01 | Stop reason: SDUPTHER

## 2017-09-02 LAB
25(OH)D3+25(OH)D2 SERPL-MCNC: 58.9 NG/ML (ref 30–100)
ALBUMIN SERPL-MCNC: 3.9 G/DL (ref 3.5–4.8)
ALBUMIN/GLOB SERPL: 1.5 {RATIO} (ref 1.2–2.2)
ALP SERPL-CCNC: 93 IU/L (ref 39–117)
ALT SERPL-CCNC: 11 IU/L (ref 0–44)
AST SERPL-CCNC: 18 IU/L (ref 0–40)
BASOPHILS # BLD AUTO: 0 X10E3/UL (ref 0–0.2)
BASOPHILS NFR BLD AUTO: 0 %
BILIRUB SERPL-MCNC: 0.4 MG/DL (ref 0–1.2)
BUN SERPL-MCNC: 27 MG/DL (ref 8–27)
BUN/CREAT SERPL: 19 (ref 10–24)
CALCIUM SERPL-MCNC: 9.3 MG/DL (ref 8.6–10.2)
CHLORIDE SERPL-SCNC: 107 MMOL/L (ref 96–106)
CHOLEST SERPL-MCNC: 159 MG/DL (ref 100–199)
CO2 SERPL-SCNC: 20 MMOL/L (ref 18–29)
COMMENT 011824: NORMAL
CREAT SERPL-MCNC: 1.42 MG/DL (ref 0.76–1.27)
EOSINOPHIL # BLD AUTO: 0.2 X10E3/UL (ref 0–0.4)
EOSINOPHIL NFR BLD AUTO: 4 %
ERYTHROCYTE [DISTWIDTH] IN BLOOD BY AUTOMATED COUNT: 14.9 % (ref 12.3–15.4)
GLOBULIN SER CALC-MCNC: 2.6 G/DL (ref 1.5–4.5)
GLUCOSE SERPL-MCNC: 104 MG/DL (ref 65–99)
HBA1C MFR BLD: 5.9 % (ref 4.8–5.6)
HCT VFR BLD AUTO: 40.3 % (ref 37.5–51)
HDLC SERPL-MCNC: 56 MG/DL
HGB BLD-MCNC: 13.8 G/DL (ref 12.6–17.7)
IMM GRANULOCYTES # BLD: ABNORMAL 10*3/UL
IMM GRANULOCYTES NFR BLD: ABNORMAL %
IMMATURE CELLS  115398: ABNORMAL
LDLC SERPL CALC-MCNC: 75 MG/DL (ref 0–99)
LYMPHOCYTES # BLD AUTO: 2.1 X10E3/UL (ref 0.7–3.1)
LYMPHOCYTES NFR BLD AUTO: 48 %
MCH RBC QN AUTO: 30.2 PG (ref 26.6–33)
MCHC RBC AUTO-ENTMCNC: 34.2 G/DL (ref 31.5–35.7)
MCV RBC AUTO: 88 FL (ref 79–97)
MONOCYTES # BLD AUTO: 0.4 X10E3/UL (ref 0.1–0.9)
MONOCYTES NFR BLD AUTO: 10 %
MORPHOLOGY BLD-IMP: ABNORMAL
NEUTROPHILS # BLD AUTO: 1.7 X10E3/UL (ref 1.4–7)
NEUTROPHILS NFR BLD AUTO: 38 %
NRBC BLD AUTO-RTO: ABNORMAL %
PLATELET # BLD AUTO: 109 X10E3/UL (ref 150–379)
POTASSIUM SERPL-SCNC: 4.2 MMOL/L (ref 3.5–5.2)
PROT SERPL-MCNC: 6.5 G/DL (ref 6–8.5)
RBC # BLD AUTO: 4.57 X10E6/UL (ref 4.14–5.8)
SODIUM SERPL-SCNC: 143 MMOL/L (ref 134–144)
T4 FREE SERPL-MCNC: 0.98 NG/DL (ref 0.82–1.77)
TRIGL SERPL-MCNC: 142 MG/DL (ref 0–149)
TSH SERPL DL<=0.005 MIU/L-ACNC: 2.32 UIU/ML (ref 0.45–4.5)
VLDLC SERPL CALC-MCNC: 28 MG/DL (ref 5–40)
WBC # BLD AUTO: 4.4 X10E3/UL (ref 3.4–10.8)

## 2017-10-12 ENCOUNTER — OFFICE VISIT (OUTPATIENT)
Dept: CARDIOLOGY | Facility: MEDICAL CENTER | Age: 77
End: 2017-10-12
Payer: MEDICARE

## 2017-10-12 ENCOUNTER — NON-PROVIDER VISIT (OUTPATIENT)
Dept: CARDIOLOGY | Facility: MEDICAL CENTER | Age: 77
End: 2017-10-12
Payer: MEDICARE

## 2017-10-12 VITALS
OXYGEN SATURATION: 97 % | DIASTOLIC BLOOD PRESSURE: 78 MMHG | BODY MASS INDEX: 24.9 KG/M2 | WEIGHT: 194 LBS | SYSTOLIC BLOOD PRESSURE: 122 MMHG | HEIGHT: 74 IN | HEART RATE: 70 BPM | RESPIRATION RATE: 14 BRPM

## 2017-10-12 DIAGNOSIS — I10 ESSENTIAL HYPERTENSION, BENIGN: ICD-10-CM

## 2017-10-12 DIAGNOSIS — Z95.0 BIVENTRICULAR CARDIAC PACEMAKER IN SITU: ICD-10-CM

## 2017-10-12 DIAGNOSIS — Z98.890 S/P THORACIC AORTIC ANEURYSM REPAIR: ICD-10-CM

## 2017-10-12 DIAGNOSIS — Z95.2 S/P AVR (AORTIC VALVE REPLACEMENT): ICD-10-CM

## 2017-10-12 DIAGNOSIS — I34.0 MITRAL VALVE INSUFFICIENCY, UNSPECIFIED ETIOLOGY: ICD-10-CM

## 2017-10-12 DIAGNOSIS — Z86.79 S/P THORACIC AORTIC ANEURYSM REPAIR: ICD-10-CM

## 2017-10-12 DIAGNOSIS — I42.0 DILATED CARDIOMYOPATHY (HCC): ICD-10-CM

## 2017-10-12 DIAGNOSIS — I34.1 MVP (MITRAL VALVE PROLAPSE): ICD-10-CM

## 2017-10-12 PROCEDURE — 99214 OFFICE O/P EST MOD 30 MIN: CPT | Mod: 25 | Performed by: INTERNAL MEDICINE

## 2017-10-12 PROCEDURE — 93281 PM DEVICE PROGR EVAL MULTI: CPT | Performed by: INTERNAL MEDICINE

## 2017-10-12 ASSESSMENT — ENCOUNTER SYMPTOMS
MYALGIAS: 0
SHORTNESS OF BREATH: 0
PALPITATIONS: 0
LOSS OF CONSCIOUSNESS: 0
BRUISES/BLEEDS EASILY: 0
ORTHOPNEA: 0
PND: 0
DIZZINESS: 0

## 2017-10-12 NOTE — PROGRESS NOTES
Subjective:   Ross Mcneal is a 77 y.o. 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    Last seen on 3/16/2017.    Since 3/16/2017 appointment the patient's had no cardiac symptoms.  Compliant with his medications.    Since 9/15/2016 appointment no cardiac symptoms.  Continues to stay physically active without any difficulty.  Plans a cruise in April.    Between 12/29/2014-12/30/2014 hospitalized [facility].  Successfully underwent biventricular pacemaker implantation by Dr. Jam Lacy.  Indications were syncope/collapse with first degree AV block and a left bundle branch block.  Has recovered well.  Biggest improvement the patient states that first, no more syncope second, no more positional dizziness, lightheadedness or near syncope when he gets up from a sitting position.  Has had chronic palpitations with skipping beats.  Has not changed.  Usually picks up on his blood pressure monitored.    Monitors his blood pressure daily and his blood pressure log indicates excellent control of his blood pressure.    Past Medical History  November, 2014 while in [place] at a conference, the patient was walking down the hotel hallway carrying some milk and the next thing he remembers he was on the floor looking up.  Had no injury.  No warning symptoms.  Did not seek medical attention.  After returning home he noticed his blood pressure was elevated at 185 systolic.  Contacted the on-call doctor. Diovan was increased from 80 to 160 mg daily in addition to HCTZ.  He has had some positional lightheadedness.  His carefully monitored his blood pressure.    The patient his wife recently moved to [place] in October, 2012.  The patient went back to [place], [place]  "undergo redo AVR and ascending aortic aneurysm repair at [facility] in [facility] on 2/21/2013.  Preoperative cardiac catheterization demonstrated that his coronary arteries \"were all patent\".  Postoperatively as well.  Had postoperative atrial fibrillation on amiodarone which as been discontinued.     Social History     Social History   • Marital status:      Spouse name: N/A   • Number of children: N/A   • Years of education: N/A     Occupational History   • Not on file.     Social History Main Topics   • Smoking status: Never Smoker   • Smokeless tobacco: Never Used   • Alcohol use 2.5 oz/week     5 Glasses of wine per week   • Drug use: No   • Sexual activity: Yes     Partners: Female     Other Topics Concern   • Not on file     Social History Narrative    Retired . . Moved to [place] in October, 2012.       Past Medical History:   Diagnosis Date   • Basal cell carcinoma of lip 11/27/2013   • Biventricular cardiac pacemaker in situ 3/4/2015   • Breath shortness    • Bronchitis    • Cancer (CMS-HCC) 2006    prostate   • Cold 12/2014    sinus infection; no antibiotics   • GERD (gastroesophageal reflux disease) 10/17/2013   • Heart burn    • Hypertension     • Indigestion    • Insomnia 10/17/2013   • LBBB (left bundle branch block)     • MEDICAL HOME    • Pneumonia    • Positional lightheadedness 3/4/2015   • Prostate cancer (CMS-HCC)     • PVCs (premature ventricular contractions) 3/4/2015   • S/P AVR (aortic valve replacement) 1/2000    [place], [place]   • S/P AVR (aortic valve replacement) 2/21/2013    red[facility]; [place], [place]   • S/P prostatectomy 2003   • S/P thoracic aortic aneurysm repair 2/21/2013   • Seasonal allergies 10/17/2013     Past Surgical History:   Procedure Laterality Date   • AORTIC VALVE REPLACEMENT  2/21/13    Bovine tissur valve with ascending aortic repair..  Done in [place].   • " APPENDECTOMY     • CARDIAC CATH  2013    normal coronaries prior to AVR.   • OTHER      carcinoma removed from lip   • OTHER CARDIAC SURGERY   and     aVR. Thoracic aortic aneurysm repair.   • PROSTATECTOMY, RADIAL  .    prostate cancer.   • RECOVERY  2014    Performed by Cath-Recovery Surgery at SURGERY SAME DAY [facility] ORS     Family History   Problem Relation Age of Onset   • Other Mother       at age 82. Parkinson's disease.   • Cancer Father       at age 68. Prostate cancer.   • Cancer Sister      Lung     History   Smoking Status   • Never Smoker   Smokeless Tobacco   • Never Used     No Known Allergies  Outpatient Encounter Prescriptions as of 10/12/2017   Medication Sig Dispense Refill   • metoprolol SR (TOPROL XL) 25 MG TABLET SR 24 HR Take 1 tablet by mouth 1/2  hour after dinner as  directed 90 Tab 3   • losartan (COZAAR) 50 MG Tab Take 1 tablet by mouth  every day 90 Tab 3   • omeprazole (PRILOSEC) 20 MG delayed-release capsule Take 1 capsule by mouth  every day 90 Cap 3   • Cetirizine HCl (ZYRTEC ALLERGY) 10 MG Cap Take 10 mg by mouth as needed.     • fluticasone (FLONASE) 50 MCG/ACT nasal spray Spray 2 Sprays in nose every day. (Patient taking differently: Spray 2 Sprays in nose as needed.) 1 Bottle 0   • Cholecalciferol (VITAMIN D) 400 UNIT TABS Take 400 Units by mouth every day.     • DiphenhydrAMINE HCl (BENADRYL ALLERGY PO) Take  by mouth as needed. As needed for sleep     • aspirin 81 MG tablet Take 81 mg by mouth every day.       No facility-administered encounter medications on file as of 10/12/2017.      Review of Systems   Respiratory: Negative for shortness of breath.    Cardiovascular: Negative for chest pain, palpitations, orthopnea, leg swelling and PND.   Musculoskeletal: Negative for myalgias.   Neurological: Negative for dizziness and loss of consciousness.   Endo/Heme/Allergies: Does not bruise/bleed easily.        Objective:   /78   Pulse 70    "Resp 14   Ht 1.88 m (6' 2\")   Wt 88 kg (194 lb)   SpO2 97%   BMI 24.91 kg/m²     Physical Exam   Constitutional: He is oriented to person, place, and time. He appears well-nourished. No distress.   HENT:   Head: Normocephalic.   Neck: No JVD present.   Normal jugular venous pressure.   Cardiovascular: Normal rate, S1 normal and S2 normal.  An irregular rhythm present. Exam reveals no gallop and no friction rub.    Murmur heard.   Systolic murmur is present with a grade of 2/6   Pulses:       Carotid pulses are 2+ on the right side, and 2+ on the left side.       Radial pulses are 2+ on the right side, and 2+ on the left side.        Femoral pulses are 2+ on the right side, and 2+ on the left side.       Posterior tibial pulses are 2+ on the right side, and 2+ on the left side.       Pulmonary/Chest: Effort normal. He has no wheezes. He has rhonchi. He has no rales.   Healed midline scar.      Abdominal: Soft. Bowel sounds are normal. He exhibits no abdominal bruit, no pulsatile midline mass and no mass. There is no hepatosplenomegaly. There is no tenderness.       Musculoskeletal: He exhibits no edema.   Neurological: He is alert and oriented to person, place, and time.   Skin: Skin is warm and dry. No cyanosis. Nails show no clubbing.   Psychiatric: He has a normal mood and affect. His behavior is normal.     02/27/2013 ECHOCARDIOGRAM  EF 30-40%. Aortic valve prosthesis. 1-2+ MR. 2+ TR.    12/12/2014 ECHOCARDIOGRAM  Pt has a prosthetic aortic valve with normal motion.  Transvalvular gradients are - Peak: 20 mmHg Mean: 8 mmHg.  Myxomatous change with prolapse of the both mitral leaflet was present.  Eccentric, more than one jets of mild to moderate mitral regurgitation.  Mild to moderately dilated left atrium.  Normal left ventricular chamber size.  Mild concentric left ventricular hypertrophy.  Difficult to determine systolic function due to irregular heart rhythm   but appeared preserved.  Septal bounces " probably from conduction abnormality (LBBB).  Normal right ventricular systolic function.  Unable to estimate pulmonary artery pressure due to an inadequate   tricuspid regurgitant jet.    11/11/2015 ECHOCARDIOGRAM  Bovine bioprosthetic aortic valve; normal function.  Normal left ventricular systolic function.  Left ventricular ejection fraction is visually estimated to be 70%.  Asymmetric septal hypertrophy.  Mitral valve prolapse of anterior and posterior leaflets with   myxomatous changes.  Moderate eccentric mitral regurgitation.  Right heart pressures are normal.    05/08/2013 EKG: Normal sinus rhythm, rate 67. First degree AV block. Left bundle branch block. Unchanged since 2/22/2013.    12/08/2014 EKG: Normal sinus rhythm, rate 92. First degree AV block. Left bundle branch block. Intermittent left anterior hemiblock. Isolated PVCs.    Assessment:     1. S/P AVR (aortic valve replacement)     2. S/P thoracic aortic aneurysm repair     3. Mitral valve insufficiency, unspecified etiology  ECHOCARDIOGRAM COMP W/O CONT   4. MVP (mitral valve prolapse)  ECHOCARDIOGRAM COMP W/O CONT   5. Biventricular cardiac pacemaker in situ     6. Essential hypertension, benign  BASIC METABOLIC PANEL   7. Dilated cardiomyopathy (CMS-HCC)       Medical Decision Making:  Today's Assessment / Status / Plan:     S/P redo AVR/ascending aortic aneurysm repair. 2/22/2013. [facility]. Severe aortic insufficiency. Clinically stable.    AVR. 2000. Critical aortic stenosis. [place], [place]    Mitral regurgitation. Moderate.     Myxomatous mitral valve disease.    Biventricular pacemaker. 12/29/2014. Syncope unknown etiology. Functioning normally.    Hypertension. Blood pressure controlled on outpatient measurements.    Cardiomyopathy. Resolved post biventricular AICD.    Syncope. 2014. No recurrence.    Palpitations. Chronic. Related to PVCs now controlled.    PVCs.  Chronic.    LBBB. Chronic.    Recommendation/Discussion  I reviewed his recent blood work which indicates new renal insufficiency.  Recheck BMP.  Echocardiogram to reassess mitral regurgitation.  Continue current therapy.   Follow-up 6 months.

## 2017-10-12 NOTE — LETTER
[facility]   [address], [facility] [address]  [place] [address]  Phone: [phone]  Fax: [phone]              Ross Mcneal  1940    Encounter Date: 10/12/2017    Rashard Conte M.D.          PROGRESS NOTE:  Subjective:   Ross Mcneal is a 77 y.o. 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    Last seen on 3/16/2017.    Since 3/16/2017 appointment the patient's had no cardiac symptoms.  Compliant with his medications.    Since 9/15/2016 appointment no cardiac symptoms.  Continues to stay physically active without any difficulty.  Plans a cruise in April.    Between 12/29/2014-12/30/2014 hospitalized [facility].  Successfully underwent biventricular pacemaker implantation by Dr. Jam Lacy.  Indications were syncope/collapse with first degree AV block and a left bundle branch block.  Has recovered well.  Biggest improvement the patient states that first, no more syncope second, no more positional dizziness, lightheadedness or near syncope when he gets up from a sitting position.  Has had chronic palpitations with skipping beats.  Has not changed.  Usually picks up on his blood pressure monitored.    Monitors his blood pressure daily and his blood pressure log indicates excellent control of his blood pressure.    Past Medical History  November, 2014 while in [place] at a conference, the patient was walking down the hotel hallway carrying some milk and the next thing he remembers he was on the floor looking up.  Had no injury.  No warning symptoms.  Did not seek medical attention.  After returning home he noticed his blood pressure was elevated at 185 systolic.  Contacted the on-call  "doctorSchuyler Diovan was increased from 80 to 160 mg daily in addition to HCTZ.  He has had some positional lightheadedness.  His carefully monitored his blood pressure.    The patient his wife recently moved to [place] in October, 2012.  The patient went back to [place], [place] undergo redo AVR and ascending aortic aneurysm repair at [facility] in [facility] on 2/21/2013.  Preoperative cardiac catheterization demonstrated that his coronary arteries \"were all patent\".  Postoperatively as well.  Had postoperative atrial fibrillation on amiodarone which as been discontinued.     Social History     Social History   • Marital status:      Spouse name: N/A   • Number of children: N/A   • Years of education: N/A     Occupational History   • Not on file.     Social History Main Topics   • Smoking status: Never Smoker   • Smokeless tobacco: Never Used   • Alcohol use 2.5 oz/week     5 Glasses of wine per week   • Drug use: No   • Sexual activity: Yes     Partners: Female     Other Topics Concern   • Not on file     Social History Narrative    Retired . . Moved to [place] in October, 2012.       Past Medical History:   Diagnosis Date   • Basal cell carcinoma of lip 11/27/2013   • Biventricular cardiac pacemaker in situ 3/4/2015   • Breath shortness    • Bronchitis    • Cancer (CMS-[facility]) 2006    prostate   • Cold 12/2014    sinus infection; no antibiotics   • GERD (gastroesophageal reflux disease) 10/17/2013   • Heart burn    • Hypertension     • Indigestion    • Insomnia 10/17/2013   • LBBB (left bundle branch block)     • MEDICAL HOME    • Pneumonia    • Positional lightheadedness 3/4/2015   • Prostate cancer (CMS-HCC)     • PVCs (premature ventricular contractions) 3/4/2015   • S/P AVR (aortic valve replacement) 1/2000    [place], [place]   • S/P AVR (aortic valve replacement) 2/21/2013    redo; [place], [place]   • S/P " prostatectomy    • S/P thoracic aortic aneurysm repair 2013   • Seasonal allergies 10/17/2013     Past Surgical History:   Procedure Laterality Date   • AORTIC VALVE REPLACEMENT  13    Bovine tissur valve with ascending aortic repair..  Done in [place].   • APPENDECTOMY     • CARDIAC CATH  2013    normal coronaries prior to AVR.   • OTHER      carcinoma removed from lip   • OTHER CARDIAC SURGERY   and     aVR. Thoracic aortic aneurysm repair.   • PROSTATECTOMY, RADIAL  .    prostate cancer.   • RECOVERY  2014    Performed by Cath-Recovery Surgery at SURGERY SAME DAY [facility] ORS     Family History   Problem Relation Age of Onset   • Other Mother       at age 82. Parkinson's disease.   • Cancer Father       at age 68. Prostate cancer.   • Cancer Sister      Lung     History   Smoking Status   • Never Smoker   Smokeless Tobacco   • Never Used     No Known Allergies  Outpatient Encounter Prescriptions as of 10/12/2017   Medication Sig Dispense Refill   • metoprolol SR (TOPROL XL) 25 MG TABLET SR 24 HR Take 1 tablet by mouth 1/2  hour after dinner as  directed 90 Tab 3   • losartan (COZAAR) 50 MG Tab Take 1 tablet by mouth  every day 90 Tab 3   • omeprazole (PRILOSEC) 20 MG delayed-release capsule Take 1 capsule by mouth  every day 90 Cap 3   • Cetirizine HCl (ZYRTEC ALLERGY) 10 MG Cap Take 10 mg by mouth as needed.     • fluticasone (FLONASE) 50 MCG/ACT nasal spray Spray 2 Sprays in nose every day. (Patient taking differently: Spray 2 Sprays in nose as needed.) 1 Bottle 0   • Cholecalciferol (VITAMIN D) 400 UNIT TABS Take 400 Units by mouth every day.     • DiphenhydrAMINE HCl (BENADRYL ALLERGY PO) Take  by mouth as needed. As needed for sleep     • aspirin 81 MG tablet Take 81 mg by mouth every day.       No facility-administered encounter medications on file as of 10/12/2017.      Review of Systems   Respiratory: Negative for shortness of breath.    Cardiovascular:  "Negative for chest pain, palpitations, orthopnea, leg swelling and PND.   Musculoskeletal: Negative for myalgias.   Neurological: Negative for dizziness and loss of consciousness.   Endo/Heme/Allergies: Does not bruise/bleed easily.        Objective:   /78   Pulse 70   Resp 14   Ht 1.88 m (6' 2\")   Wt 88 kg (194 lb)   SpO2 97%   BMI 24.91 kg/m²      Physical Exam   Constitutional: He is oriented to person, place, and time. He appears well-nourished. No distress.   HENT:   Head: Normocephalic.   Neck: No JVD present.   Normal jugular venous pressure.   Cardiovascular: Normal rate, S1 normal and S2 normal.  An irregular rhythm present. Exam reveals no gallop and no friction rub.    Murmur heard.   Systolic murmur is present with a grade of 2/6   Pulses:       Carotid pulses are 2+ on the right side, and 2+ on the left side.       Radial pulses are 2+ on the right side, and 2+ on the left side.        Femoral pulses are 2+ on the right side, and 2+ on the left side.       Posterior tibial pulses are 2+ on the right side, and 2+ on the left side.       Pulmonary/Chest: Effort normal. He has no wheezes. He has rhonchi. He has no rales.   Healed midline scar.      Abdominal: Soft. Bowel sounds are normal. He exhibits no abdominal bruit, no pulsatile midline mass and no mass. There is no hepatosplenomegaly. There is no tenderness.       Musculoskeletal: He exhibits no edema.   Neurological: He is alert and oriented to person, place, and time.   Skin: Skin is warm and dry. No cyanosis. Nails show no clubbing.   Psychiatric: He has a normal mood and affect. His behavior is normal.     02/27/2013 ECHOCARDIOGRAM  EF 30-40%. Aortic valve prosthesis. 1-2+ MR. 2+ TR.    12/12/2014 ECHOCARDIOGRAM  Pt has a prosthetic aortic valve with normal motion.  Transvalvular gradients are - Peak: 20 mmHg Mean: 8 mmHg.  Myxomatous change with prolapse of the both mitral leaflet was present.  Eccentric, more than one jets of mild " to moderate mitral regurgitation.  Mild to moderately dilated left atrium.  Normal left ventricular chamber size.  Mild concentric left ventricular hypertrophy.  Difficult to determine systolic function due to irregular heart rhythm   but appeared preserved.  Septal bounces probably from conduction abnormality (LBBB).  Normal right ventricular systolic function.  Unable to estimate pulmonary artery pressure due to an inadequate   tricuspid regurgitant jet.    11/11/2015 ECHOCARDIOGRAM  Bovine bioprosthetic aortic valve; normal function.  Normal left ventricular systolic function.  Left ventricular ejection fraction is visually estimated to be 70%.  Asymmetric septal hypertrophy.  Mitral valve prolapse of anterior and posterior leaflets with   myxomatous changes.  Moderate eccentric mitral regurgitation.  Right heart pressures are normal.    05/08/2013 EKG: Normal sinus rhythm, rate 67. First degree AV block. Left bundle branch block. Unchanged since 2/22/2013.    12/08/2014 EKG: Normal sinus rhythm, rate 92. First degree AV block. Left bundle branch block. Intermittent left anterior hemiblock. Isolated PVCs.    Assessment:     1. S/P AVR (aortic valve replacement)     2. S/P thoracic aortic aneurysm repair     3. Mitral valve insufficiency, unspecified etiology  ECHOCARDIOGRAM COMP W/O CONT   4. MVP (mitral valve prolapse)  ECHOCARDIOGRAM COMP W/O CONT   5. Biventricular cardiac pacemaker in situ     6. Essential hypertension, benign  BASIC METABOLIC PANEL   7. Dilated cardiomyopathy (CMS-HCC)       Medical Decision Making:  Today's Assessment / Status / Plan:     S/P redo AVR/ascending aortic aneurysm repair. 2/22/2013. [facility]. Severe aortic insufficiency. Clinically stable.    AVR. 2000. Critical aortic stenosis. [place], [place]    Mitral regurgitation. Moderate.     Myxomatous mitral valve disease.    Biventricular pacemaker. 12/29/2014.  Syncope unknown etiology. Functioning normally.    Hypertension. Blood pressure controlled on outpatient measurements.    Cardiomyopathy. Resolved post biventricular AICD.    Syncope. 2014. No recurrence.    Palpitations. Chronic. Related to PVCs now controlled.    PVCs. Chronic.    LBBB. Chronic.    Recommendation/Discussion  I reviewed his recent blood work which indicates new renal insufficiency.  Recheck BMP.  Echocardiogram to reassess mitral regurgitation.  Continue current therapy.   Follow-up 6 months.         Scottie Packer M.D.  [address]  [address]  [facility] [address]  VIA In Basket

## 2017-10-14 LAB
BUN SERPL-MCNC: 16 MG/DL (ref 8–27)
BUN/CREAT SERPL: 14 (ref 10–24)
CALCIUM SERPL-MCNC: 9.3 MG/DL (ref 8.6–10.2)
CHLORIDE SERPL-SCNC: 104 MMOL/L (ref 96–106)
CO2 SERPL-SCNC: 24 MMOL/L (ref 18–29)
CREAT SERPL-MCNC: 1.14 MG/DL (ref 0.76–1.27)
GLUCOSE SERPL-MCNC: 102 MG/DL (ref 65–99)
POTASSIUM SERPL-SCNC: 4.7 MMOL/L (ref 3.5–5.2)
SODIUM SERPL-SCNC: 141 MMOL/L (ref 134–144)

## 2017-11-01 ENCOUNTER — HOSPITAL ENCOUNTER (OUTPATIENT)
Dept: CARDIOLOGY | Facility: MEDICAL CENTER | Age: 77
End: 2017-11-01
Attending: INTERNAL MEDICINE
Payer: MEDICARE

## 2017-11-01 DIAGNOSIS — I34.1 MVP (MITRAL VALVE PROLAPSE): ICD-10-CM

## 2017-11-01 DIAGNOSIS — I34.0 MITRAL VALVE INSUFFICIENCY, UNSPECIFIED ETIOLOGY: ICD-10-CM

## 2017-11-01 PROCEDURE — 93306 TTE W/DOPPLER COMPLETE: CPT

## 2017-11-01 PROCEDURE — 93306 TTE W/DOPPLER COMPLETE: CPT | Mod: 26 | Performed by: INTERNAL MEDICINE

## 2017-11-02 LAB
LV EJECT FRACT  99904: 70
LV EJECT FRACT MOD 2C 99903: 72.99
LV EJECT FRACT MOD 4C 99902: 67.42
LV EJECT FRACT MOD BP 99901: 69.77

## 2017-11-13 ENCOUNTER — TELEPHONE (OUTPATIENT)
Dept: CARDIOLOGY | Facility: MEDICAL CENTER | Age: 77
End: 2017-11-13

## 2017-11-14 NOTE — TELEPHONE ENCOUNTER
S/w pt, discussed echo changes, mitral regurg going from moderate to severe.  Pt states he has absolutely no symptoms and no changes in his health at all.  Pt denies sob, dizziness, etc.  Advised pt that is usually what we take into consideration along with the finding and nothing more to do at this time, but will discuss with SW as well for any add'l recommendations.  Pt v/u and thankful for call.    To SW to review recent echo and compare to 11/2015.

## 2017-11-14 NOTE — TELEPHONE ENCOUNTER
----- Message from Pari Andrews sent at 11/13/2017  9:50 AM PST -----  Regarding: recent echo compared to previous echo  Contact: [phone]  LEONIDES/reji    Pt calling to discuss results of recent echo in relationship to the previous echo done at [facility] on 11/11/15.  Pt is interested in getting SW's opinion of the changes.       Please call Ross [phone]

## 2018-03-10 NOTE — LETTER
March 12, 2018        Ross Mcneal  [address] [address]        Dear Ross:    This letter is to inform you the you are due for an annual appointment. Please contact our scheduling department at [phone] To schedule       If you have any questions or concerns, please don't hesitate to call.        Sincerely,        Scottie Packer M.D.    Electronically Signed

## 2018-03-12 RX ORDER — OMEPRAZOLE 20 MG/1
CAPSULE, DELAYED RELEASE ORAL
Qty: 90 CAP | Refills: 0 | Status: SHIPPED | OUTPATIENT
Start: 2018-03-12 | End: 2018-06-06 | Stop reason: SDUPTHER

## 2018-03-12 NOTE — TELEPHONE ENCOUNTER
Refill done. Patient is due for annual appointment. Please have patient schedule.  Scottie Packer M.D.

## 2018-03-15 ENCOUNTER — TELEPHONE (OUTPATIENT)
Dept: CARDIOLOGY | Facility: MEDICAL CENTER | Age: 78
End: 2018-03-15

## 2018-03-16 NOTE — TELEPHONE ENCOUNTER
[facility] is requesting cardiac clearance for pt to have colonoscopy and hold ASA for 7 days.     Hx: last ov 10/2017, severe mitral regurg per echo 11/2017.     To Dr. Conte

## 2018-04-30 RX ORDER — LOSARTAN POTASSIUM 50 MG/1
TABLET ORAL
Qty: 90 TAB | Refills: 0 | Status: SHIPPED | OUTPATIENT
Start: 2018-04-30 | End: 2018-06-06 | Stop reason: SDUPTHER

## 2018-05-04 ENCOUNTER — OFFICE VISIT (OUTPATIENT)
Dept: CARDIOLOGY | Facility: MEDICAL CENTER | Age: 78
End: 2018-05-04
Payer: MEDICARE

## 2018-05-04 ENCOUNTER — NON-PROVIDER VISIT (OUTPATIENT)
Dept: CARDIOLOGY | Facility: MEDICAL CENTER | Age: 78
End: 2018-05-04
Payer: MEDICARE

## 2018-05-04 VITALS
OXYGEN SATURATION: 96 % | HEART RATE: 70 BPM | RESPIRATION RATE: 14 BRPM | BODY MASS INDEX: 24.77 KG/M2 | WEIGHT: 193 LBS | HEIGHT: 74 IN | SYSTOLIC BLOOD PRESSURE: 120 MMHG | DIASTOLIC BLOOD PRESSURE: 70 MMHG

## 2018-05-04 DIAGNOSIS — Z95.2 S/P AVR (AORTIC VALVE REPLACEMENT): ICD-10-CM

## 2018-05-04 DIAGNOSIS — I34.0 MITRAL VALVE INSUFFICIENCY, UNSPECIFIED ETIOLOGY: ICD-10-CM

## 2018-05-04 DIAGNOSIS — I34.1 MVP (MITRAL VALVE PROLAPSE): ICD-10-CM

## 2018-05-04 DIAGNOSIS — Z95.0 BIVENTRICULAR CARDIAC PACEMAKER IN SITU: ICD-10-CM

## 2018-05-04 DIAGNOSIS — I10 ESSENTIAL HYPERTENSION, BENIGN: ICD-10-CM

## 2018-05-04 PROCEDURE — 93281 PM DEVICE PROGR EVAL MULTI: CPT | Performed by: INTERNAL MEDICINE

## 2018-05-04 PROCEDURE — 99214 OFFICE O/P EST MOD 30 MIN: CPT | Performed by: INTERNAL MEDICINE

## 2018-05-04 ASSESSMENT — ENCOUNTER SYMPTOMS
SHORTNESS OF BREATH: 0
MYALGIAS: 0
COUGH: 0
LOSS OF CONSCIOUSNESS: 0
PALPITATIONS: 0
DIZZINESS: 0

## 2018-05-04 NOTE — PROGRESS NOTES
Chief Complaint   Patient presents with   • Results       Subjective:   Ross Mcneal is a 78 y.o. 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  Last seen on 10/12/2017.    Since 10/12/2017 the patient has had no cardiac symptoms.  No heart failure symptoms, palpitations or dizziness.  AICD device functioning normally.     Since 3/16/2017 appointment the patient's had no cardiac symptoms.  Compliant with his medications.     Since 9/15/2016 appointment no cardiac symptoms.  Continues to stay physically active without any difficulty.  Plans a cruise in April.     Between 12/29/2014-12/30/2014 hospitalized [facility].  Successfully underwent biventricular pacemaker implantation by Dr. Jam Lacy.  Indications were syncope/collapse with first degree AV block and a left bundle branch block.  Has recovered well.  Biggest improvement the patient states that first, no more syncope second, no more positional dizziness, lightheadedness or near syncope when he gets up from a sitting position.  Has had chronic palpitations with skipping beats.  Has not changed.  Usually picks up on his blood pressure monitored.     Monitors his blood pressure daily and his blood pressure log indicates excellent control of his blood pressure.     Past Medical History  November, 2014 while in [place] at a conference, the patient was walking down the hotel hallway carrying some milk and the next thing he remembers he was on the floor looking up.  Had no injury.  No warning symptoms.  Did not seek medical attention.  After returning home he noticed his blood pressure was elevated at 185 systolic.  Contacted the on-call doctor. Diovan was increased from 80 to 160 mg daily in  "addition to HCTZ.  He has had some positional lightheadedness.  His carefully monitored his blood pressure.     The patient his wife recently moved to [place] in October, 2012.  The patient went back to [place], [place] undergo redo AVR and ascending aortic aneurysm repair at [facility] in [facility] on 2/21/2013.  Preoperative cardiac catheterization demonstrated that his coronary arteries \"were all patent\".  Postoperatively as well.  Had postoperative atrial fibrillation on amiodarone which as been discontinued.    Past Medical History:   Diagnosis Date   • Basal cell carcinoma of lip 11/27/2013   • Biventricular cardiac pacemaker in situ 3/4/2015   • Breath shortness    • Bronchitis    • Cancer (HCC) 2006    prostate   • Cold 12/2014    sinus infection; no antibiotics   • GERD (gastroesophageal reflux disease) 10/17/2013   • Heart burn    • Hypertension     • Indigestion    • Insomnia 10/17/2013   • LBBB (left bundle branch block)     • MEDICAL HOME    • Pneumonia    • Positional lightheadedness 3/4/2015   • Prostate cancer (HCC)     • PVCs (premature ventricular contractions) 3/4/2015   • S/P AVR (aortic valve replacement) 1/2000    [place], [place]   • S/P AVR (aortic valve replacement) 2/21/2013    redo; [place], [place]   • S/P prostatectomy 2003   • S/P thoracic aortic aneurysm repair 2/21/2013   • Seasonal allergies 10/17/2013     Past Surgical History:   Procedure Laterality Date   • RECOVERY  12/29/2014    Performed by Cath-Recovery Surgery at SURGERY SAME DAY [facility] ORS   • AORTIC VALVE REPLACEMENT  2/21/13    Bovine tissur valve with ascending aortic repair..  Done in [place].   • CARDIAC CATH  2/2013    normal coronaries prior to AVR.   • OTHER  2013    carcinoma removed from lip   • APPENDECTOMY     • OTHER CARDIAC SURGERY  2000 and 2013    aVR. Thoracic aortic aneurysm repair.   • PROSTATECTOMY, RADIAL  " .    prostate cancer.     Family History   Problem Relation Age of Onset   • Other Mother       at age 82. Parkinson's disease.   • Cancer Father       at age 68. Prostate cancer.   • Cancer Sister      Lung     Social History     Social History   • Marital status:      Spouse name: N/A   • Number of children: N/A   • Years of education: N/A     Occupational History   • Not on file.     Social History Main Topics   • Smoking status: Never Smoker   • Smokeless tobacco: Never Used   • Alcohol use 2.5 oz/week     5 Glasses of wine per week   • Drug use: No   • Sexual activity: Yes     Partners: Female     Other Topics Concern   • Not on file     Social History Narrative    Retired . . Moved to [place] in .     No Known Allergies  Outpatient Encounter Prescriptions as of 2018   Medication Sig Dispense Refill   • losartan (COZAAR) 50 MG Tab TAKE 1 TABLET BY MOUTH  EVERY DAY 90 Tab 0   • omeprazole (PRILOSEC) 20 MG delayed-release capsule TAKE 1 CAPSULE BY MOUTH  EVERY DAY 90 Cap 0   • metoprolol SR (TOPROL XL) 25 MG TABLET SR 24 HR Take 1 tablet by mouth 1/2  hour after dinner as  directed 90 Tab 3   • Cetirizine HCl (ZYRTEC ALLERGY) 10 MG Cap Take 10 mg by mouth as needed.     • fluticasone (FLONASE) 50 MCG/ACT nasal spray Spray 2 Sprays in nose every day. (Patient taking differently: Spray 2 Sprays in nose as needed.) 1 Bottle 0   • Cholecalciferol (VITAMIN D) 400 UNIT TABS Take 400 Units by mouth every day.     • DiphenhydrAMINE HCl (BENADRYL ALLERGY PO) Take  by mouth as needed. As needed for sleep     • aspirin 81 MG tablet Take 81 mg by mouth every day.       No facility-administered encounter medications on file as of 2018.      Review of Systems   Respiratory: Negative for cough and shortness of breath.    Cardiovascular: Negative for chest pain and palpitations.   Musculoskeletal: Negative for myalgias.   Neurological: Negative for dizziness and loss of  "consciousness.        Objective:   /70   Pulse 70   Resp 14   Ht 1.88 m (6' 2\")   Wt 87.5 kg (193 lb)   SpO2 96%   BMI 24.78 kg/m²     Physical Exam   Constitutional: He is oriented to person, place, and time. He appears well-developed and well-nourished. No distress.   Neck: No JVD present.   Cardiovascular: Normal rate, regular rhythm and intact distal pulses.  Exam reveals no gallop and no friction rub.    Murmur heard.  Pulmonary/Chest: Effort normal and breath sounds normal. No respiratory distress. He has no wheezes. He has no rales.   Musculoskeletal: He exhibits no edema.   Neurological: He is alert and oriented to person, place, and time.   Skin: Skin is warm and dry.   Psychiatric: He has a normal mood and affect. His behavior is normal.     02/27/2013 ECHOCARDIOGRAM  EF 30-40%. Aortic valve prosthesis. 1-2+ MR. 2+ TR.     12/12/2014 ECHOCARDIOGRAM  Pt has a prosthetic aortic valve with normal motion.  Transvalvular gradients are - Peak: 20 mmHg Mean: 8 mmHg.  Myxomatous change with prolapse of the both mitral leaflet was present.  Eccentric, more than one jets of mild to moderate mitral regurgitation.  Mild to moderately dilated left atrium.  Normal left ventricular chamber size.  Mild concentric left ventricular hypertrophy.  Difficult to determine systolic function due to irregular heart rhythm   but appeared preserved.  Septal bounces probably from conduction abnormality (LBBB).  Normal right ventricular systolic function.  Unable to estimate pulmonary artery pressure due to an inadequate   tricuspid regurgitant jet.     11/11/2015 ECHOCARDIOGRAM  Bovine bioprosthetic aortic valve; normal function.  Normal left ventricular systolic function.  Left ventricular ejection fraction is visually estimated to be 70%.  Asymmetric septal hypertrophy.  Mitral valve prolapse of anterior and posterior leaflets with   myxomatous changes.  Moderate eccentric mitral regurgitation.  Right heart pressures " are normal.    11/01/2017 ECHOCARDIOGRAM   Normal left   ventricular systolic function.  Left ventricular ejection fraction is visually estimated to be 70%.  Myxomatous mitral valve leaflet thickening with prolapse of the   anterior and posterior leaflets.  Severe eccentric mitral regurgitation.  Normal function of mechanical prosthetic valve.  Right heart pressures are normal.  Pacer/ICD wire seen in right ventricle.    05/08/2013 EKG: Normal sinus rhythm, rate 67. First degree AV block. Left bundle branch block. Unchanged since 2/22/2013.     12/08/2014 EKG: Normal sinus rhythm, rate 92. First degree AV block. Left bundle branch block. Intermittent left anterior hemiblock. Isolated PVCs.    Assessment:     1. S/P AVR (aortic valve replacement)     2. Mitral valve insufficiency, unspecified etiology     3. MVP (mitral valve prolapse)     4. Biventricular cardiac pacemaker in situ     5. Essential hypertension, benign         Medical Decision Making:  Today's Assessment / Status / Plan:   S/P redo AVR/ascending aortic aneurysm repair. 2/22/2013. [facility]. Severe aortic insufficiency. Clinically stable.     AVR. 2000. Critical aortic stenosis. [place], [place]     Mitral regurgitation. Moderate.      Myxomatous mitral valve disease.     Biventricular pacemaker. 12/29/2014. Syncope unknown etiology. Functioning normally.     Hypertension. Blood pressure controlled on outpatient measurements.     Cardiomyopathy. Resolved post biventricular AICD.     Syncope. 2014. No recurrence.     Palpitations. Chronic. Related to PVCs now controlled.     PVCs. Chronic.     LBBB. Chronic.     Recommendation/Discussion  1. Review the results of his most recent echocardiogram which shows normal left ventricular ejection fraction with severe mitral regurgitation.  2. The patient is asymptomatic. Mitral regurgitation.  3. We discussed further evaluation of his mitral  regurgitation with a TRAVIS with therapeutic consideration including mitral clip versus repeat cardiothoracic surgery.  4. The patient and his wife have travel plans throughout the summer and early fall and went to return then for further evaluation.  5. If he has any worsening symptoms of shortness of breath or other cardiac related symptoms. He was instructed to contact me.  6. Follow-up echocardiogram next appointment with anticipation of TRAVIS.

## 2018-05-17 ENCOUNTER — PATIENT OUTREACH (OUTPATIENT)
Dept: HEALTH INFORMATION MANAGEMENT | Facility: OTHER | Age: 78
End: 2018-05-17

## 2018-05-17 NOTE — PROGRESS NOTES
1. Attempt #: 1    2. HealthConnect Verified: yes    3. Verify PCP: yes    4. Care Team Updated:       •   DME Company (gait device, O2, CPAP, etc.): YES       •   Other Specialists (eye doctor, derm, GYN, cardiology, endo, etc): YES    5.  Reviewed/Updated the following with patient:       •   Communication Preference Obtained? YES       •   Preferred Pharmacy? YES       •   Preferred Lab? YES       •   Family History (document living status of immediate family members and if + hx of cancer, diabetes, hypertension, hyperlipidemia, heart attack, stroke) YES. Was Abstract Encounter opened and chart updated? YES    6. ScaleArc Activation: already active    7. ScaleArc Dang: no    8. Annual Wellness Visit Scheduling  Scheduling Status:Scheduled      9. Care Gap Scheduling (Attempt to Schedule EACH Overdue Care Gap!)     Health Maintenance Due   Topic Date Due   • IMM DTaP/Tdap/Td Vaccine (1 - Tdap) 03/08/1959   • Annual Wellness Visit  03/14/2018        Scheduled patient for Annual Wellness Visit    10. Patient was advised: “This is a free wellness visit. The provider will screen for medical conditions to help you stay healthy. If you have other concerns to address you may be asked to discuss these at a separate visit or there may be an additional fee.”     11. Patient was informed to arrive 15 min prior to their scheduled appointment and bring in their medication bottles.

## 2018-06-01 RX ORDER — METOPROLOL SUCCINATE 25 MG/1
TABLET, EXTENDED RELEASE ORAL
Qty: 90 TAB | Refills: 0 | Status: SHIPPED | OUTPATIENT
Start: 2018-06-01 | End: 2018-10-15 | Stop reason: SDUPTHER

## 2018-06-05 ENCOUNTER — OFFICE VISIT (OUTPATIENT)
Dept: MEDICAL GROUP | Facility: MEDICAL CENTER | Age: 78
End: 2018-06-05
Payer: MEDICARE

## 2018-06-05 VITALS
WEIGHT: 194 LBS | TEMPERATURE: 97 F | HEART RATE: 80 BPM | OXYGEN SATURATION: 97 % | BODY MASS INDEX: 24.9 KG/M2 | SYSTOLIC BLOOD PRESSURE: 142 MMHG | DIASTOLIC BLOOD PRESSURE: 76 MMHG | HEIGHT: 74 IN

## 2018-06-05 DIAGNOSIS — Z85.46 H/O PROSTATE CANCER: ICD-10-CM

## 2018-06-05 DIAGNOSIS — Z85.828 H/O NONMELANOMA SKIN CANCER: ICD-10-CM

## 2018-06-05 DIAGNOSIS — I34.0 MITRAL VALVE INSUFFICIENCY, UNSPECIFIED ETIOLOGY: ICD-10-CM

## 2018-06-05 DIAGNOSIS — Z95.2 S/P AVR (AORTIC VALVE REPLACEMENT): ICD-10-CM

## 2018-06-05 DIAGNOSIS — I44.7 LBBB (LEFT BUNDLE BRANCH BLOCK): ICD-10-CM

## 2018-06-05 DIAGNOSIS — Z95.0 BIVENTRICULAR CARDIAC PACEMAKER IN SITU: ICD-10-CM

## 2018-06-05 DIAGNOSIS — I44.0 FIRST DEGREE ATRIOVENTRICULAR BLOCK: ICD-10-CM

## 2018-06-05 DIAGNOSIS — F51.01 PRIMARY INSOMNIA: ICD-10-CM

## 2018-06-05 DIAGNOSIS — Z00.00 MEDICARE ANNUAL WELLNESS VISIT, SUBSEQUENT: ICD-10-CM

## 2018-06-05 DIAGNOSIS — I49.3 PVCS (PREMATURE VENTRICULAR CONTRACTIONS): ICD-10-CM

## 2018-06-05 DIAGNOSIS — J30.1 SEASONAL ALLERGIC RHINITIS DUE TO POLLEN: ICD-10-CM

## 2018-06-05 DIAGNOSIS — Z86.79 S/P THORACIC AORTIC ANEURYSM REPAIR: ICD-10-CM

## 2018-06-05 DIAGNOSIS — M25.551 BILATERAL HIP PAIN: ICD-10-CM

## 2018-06-05 DIAGNOSIS — I10 ESSENTIAL HYPERTENSION, BENIGN: ICD-10-CM

## 2018-06-05 DIAGNOSIS — Z98.890 S/P THORACIC AORTIC ANEURYSM REPAIR: ICD-10-CM

## 2018-06-05 DIAGNOSIS — I34.1 MVP (MITRAL VALVE PROLAPSE): ICD-10-CM

## 2018-06-05 DIAGNOSIS — R73.03 PREDIABETES: ICD-10-CM

## 2018-06-05 DIAGNOSIS — M25.552 BILATERAL HIP PAIN: ICD-10-CM

## 2018-06-05 DIAGNOSIS — K21.9 GASTROESOPHAGEAL REFLUX DISEASE WITHOUT ESOPHAGITIS: ICD-10-CM

## 2018-06-05 PROCEDURE — G0439 PPPS, SUBSEQ VISIT: HCPCS | Performed by: FAMILY MEDICINE

## 2018-06-05 ASSESSMENT — ACTIVITIES OF DAILY LIVING (ADL): BATHING_REQUIRES_ASSISTANCE: 0

## 2018-06-05 ASSESSMENT — ENCOUNTER SYMPTOMS: GENERAL WELL-BEING: POOR

## 2018-06-05 ASSESSMENT — PAIN SCALES - GENERAL: PAINLEVEL: 1=MINIMAL PAIN

## 2018-06-05 ASSESSMENT — PATIENT HEALTH QUESTIONNAIRE - PHQ9: CLINICAL INTERPRETATION OF PHQ2 SCORE: 0

## 2018-06-05 NOTE — PROGRESS NOTES
Chief Complaint   Patient presents with   • Annual Wellness Visit         HPI:  Ross is a 78 y.o. here for Medicare Annual Wellness Visit        Patient Active Problem List    Diagnosis Date Noted   • H/O nonmelanoma skin cancer 11/27/2013     Priority: High   • MVP (mitral valve prolapse) 03/16/2017   • Mitral regurgitation 03/16/2017   • H/O prostate cancer 03/13/2017   • Essential hypertension, benign 09/15/2016   • Bilateral hip pain 08/10/2015   • Biventricular cardiac pacemaker in situ 03/04/2015   • PVCs (premature ventricular contractions) 03/04/2015   • Prediabetes 02/12/2015   • First degree atrioventricular block 12/29/2014   • Seasonal allergies 10/17/2013   • GERD (gastroesophageal reflux disease) 10/17/2013   • Insomnia 10/17/2013   • LBBB (left bundle branch block) 05/08/2013   • S/P AVR (aortic valve replacement) 05/08/2013   • S/P thoracic aortic aneurysm repair 05/08/2013       Current Outpatient Prescriptions   Medication Sig Dispense Refill   • metoprolol SR (TOPROL XL) 25 MG TABLET SR 24 HR TAKE 1 TABLET BY MOUTH 1/2  HOUR AFTER DINNER AS  DIRECTED 90 Tab 0   • losartan (COZAAR) 50 MG Tab TAKE 1 TABLET BY MOUTH  EVERY DAY 90 Tab 0   • omeprazole (PRILOSEC) 20 MG delayed-release capsule TAKE 1 CAPSULE BY MOUTH  EVERY DAY 90 Cap 0   • Cholecalciferol (VITAMIN D) 400 UNIT TABS Take 400 Units by mouth every day.     • DiphenhydrAMINE HCl (BENADRYL ALLERGY PO) Take  by mouth as needed. As needed for sleep     • aspirin 81 MG tablet Take 81 mg by mouth every day.     • Cetirizine HCl (ZYRTEC ALLERGY) 10 MG Cap Take 10 mg by mouth as needed.     • fluticasone (FLONASE) 50 MCG/ACT nasal spray Spray 2 Sprays in nose every day. (Patient taking differently: Spray 2 Sprays in nose as needed.) 1 Bottle 0     No current facility-administered medications for this visit.         Patient is taking medications as noted in medication list.  Current supplements as per medication list.     Allergies: Patient has no  known allergies.    Current social contact/activities:  Patient lily     Is patient current with immunizations? No, due for TDAP and SHINGRIX (Shingles). Patient is interested in receiving NONE today.    He  reports that he has never smoked. He has never used smokeless tobacco. He reports that he drinks about 2.5 oz of alcohol per week . He reports that he does not use drugs.  Counseling given: Not Answered        DPA/Advanced directive: Patient has Durable Power of  on file.     ROS:    Gait: Uses no assistive device   Ostomy: no   Other tubes: no   Amputations: no   Chronic oxygen use no   Last eye exam within the last 6 months    Wears hearing aids: no   : Reports urinary leakage during the last 6 months that has not interfered at all with their daily activities or sleep.        Depression Screening    Little interest or pleasure in doing things?  0 - not at all  Feeling down, depressed, or hopeless? 0 - not at all  Patient Health Questionnaire Score: 0    If depressive symptoms identified deferred to follow up visit unless specifically addressed in assessment and plan.    Interpretation of PHQ-9 Total Score   Score Severity   1-4 No Depression   5-9 Mild Depression   10-14 Moderate Depression   15-19 Moderately Severe Depression   20-27 Severe Depression    Screening for Cognitive Impairment    Three Minute Recall (leader, season, table)  3/3    Alexis clock face with all 12 numbers and set the hands to show 10 past 11.  Yes 5/5  If cognitive concerns identified, deferred for follow up unless specifically addressed in assessment and plan.    Fall Risk Assessment    Has the patient had two or more falls in the last year or any fall with injury in the last year?  No  If fall risk identified, deferred for follow up unless specifically addressed in assessment and plan.    Safety Assessment    Throw rugs on floor.  Yes  Handrails on all stairs.  Yes  Good lighting in all hallways.  Yes  Difficulty  hearing.  No  Patient counseled about all safety risks that were identified.    Functional Assessment ADLs    Are there any barriers preventing you from cooking for yourself or meeting nutritional needs?  No.    Are there any barriers preventing you from driving safely or obtaining transportation?  No.    Are there any barriers preventing you from using a telephone or calling for help?  No.    Are there any barriers preventing you from shopping?  No.    Are there any barriers preventing you from taking care of your own finances?  No.    Are there any barriers preventing you from managing your medications?  No.    Are there any barriers preventing you from showering, bathing or dressing yourself?  No.    Are you currently engaging in any exercise or physical activity?  No.     What is your perception of your health?  Poor.    Health Maintenance Summary                IMM DTaP/Tdap/Td Vaccine Overdue 3/8/1959     Annual Wellness Visit Overdue 3/14/2018      Done 3/13/2017 Visit Dx: Medicare annual wellness visit, subsequent     Patient has more history with this topic...    COLONOSCOPY Next Due 2023      Done 2018 REFERRAL TO GI FOR COLONOSCOPY     Patient has more history with this topic...          Patient Care Team:  Scottie Packer M.D. as PCP - General (Family Medicine)  Rashard Conte M.D. as Consulting Physician (Cardiology)  [facility] as Consulting Physician  Blayne Ruiz M.D. as Consulting Physician (Ophthalmology)    Social History   Substance Use Topics   • Smoking status: Never Smoker   • Smokeless tobacco: Never Used   • Alcohol use 2.5 oz/week     5 Glasses of wine per week      Comment: a week      Family History   Problem Relation Age of Onset   • Other Mother       at age 82. Parkinson's disease.   • Cancer Father       at age 68. Prostate cancer.   • Cancer Sister      Lung     He  has a past medical history of Basal cell carcinoma of lip (2013);  "Biventricular cardiac pacemaker in situ (3/4/2015); Breath shortness; Bronchitis; Cancer ([facility]) (2006); Cold (12/2014); GERD (gastroesophageal reflux disease) (10/17/2013); Heart burn; Hypertension ( ); Indigestion; Insomnia (10/17/2013); LBBB (left bundle branch block) ( ); MEDICAL HOME; Pneumonia; Positional lightheadedness (3/4/2015); Prostate cancer ([facility]) ( ); PVCs (premature ventricular contractions) (3/4/2015); S/P AVR (aortic valve replacement) (1/2000); S/P AVR (aortic valve replacement) (2/21/2013); S/P prostatectomy (2003); S/P thoracic aortic aneurysm repair (2/21/2013); and Seasonal allergies (10/17/2013).   Past Surgical History:   Procedure Laterality Date   • RECOVERY  12/29/2014    Performed by Cath-Recovery Surgery at SURGERY SAME DAY [facility] ORS   • AORTIC VALVE REPLACEMENT  2/21/13    Bovine tissur valve with ascending aortic repair..  Done in [place].   • CARDIAC CATH  2/2013    normal coronaries prior to AVR.   • OTHER  2013    carcinoma removed from lip   • APPENDECTOMY     • OTHER CARDIAC SURGERY  2000 and 2013    aVR. Thoracic aortic aneurysm repair.   • PROSTATECTOMY, RADIAL  2003.    prostate cancer.           Exam:     Blood pressure 142/76, pulse 80, temperature 36.1 °C (97 °F), height 1.88 m (6' 2\"), weight 88 kg (194 lb), SpO2 97 %. Body mass index is 24.91 kg/m².    Constitutional: Alert, no distress.  Skin: Warm, dry, good turgor, no rashes in visible areas.  Eye: Equal, round and reactive, conjunctiva clear, lids normal.  Respiratory: Unlabored respiratory effort, lungs clear to auscultation, no wheezes, no ronchi.  Cardiovascular: Normal S1, S2, significant systolic murmur.  Psych: Alert and oriented x3, normal affect and mood.      Assessment and Plan. The following treatment and monitoring plan is recommended:    1. Medicare annual wellness visit, subsequent  Advised diet and exercise.  - Annual Wellness Visit - Includes PPPS Subsequent ()    2. S/P AVR (aortic valve " replacement)  Stable.  Continue to monitor.  - Annual Wellness Visit - Includes PPPS Subsequent ()    3. Mitral valve insufficiency, unspecified etiology  Severe with mild symptoms.  Patient is following with cardiology closely.  He is considering valve replacements versus clipping.  - Annual Wellness Visit - Includes PPPS Subsequent ()    4. MVP (mitral valve prolapse)  Severe with mild symptoms.  Patient is following with cardiology closely.  He is considering valve replacements versus clipping.  - Annual Wellness Visit - Includes PPPS Subsequent ()    5. First degree atrioventricular block  Stable.  Continue to monitor.  - Annual Wellness Visit - Includes PPPS Subsequent ()    6. Biventricular cardiac pacemaker in situ  Stable.  Continue to monitor.  - Annual Wellness Visit - Includes PPPS Subsequent ()    7. PVCs (premature ventricular contractions)  Stable.  Continue to monitor.  - Annual Wellness Visit - Includes PPPS Subsequent ()    8. LBBB (left bundle branch block)  Stable.  Continue to monitor.  - Annual Wellness Visit - Includes PPPS Subsequent ()    9. S/P thoracic aortic aneurysm repair  Stable.  Continue to monitor.  - Annual Wellness Visit - Includes PPPS Subsequent ()    10. Prediabetes  Advised lifestyle modification.  - Annual Wellness Visit - Includes PPPS Subsequent ()    11. Essential hypertension, benign  Controlled.  Continue current medication.  - Annual Wellness Visit - Includes PPPS Subsequent ()    12. H/O prostate cancer  Patient is status post 15 years from surgery.  - Annual Wellness Visit - Includes PPPS Subsequent ()    13. H/O nonmelanoma skin cancer  No new lesions per patient.  He is not following with dermatology.  - Annual Wellness Visit - Includes PPPS Subsequent ()    14. Seasonal allergic rhinitis due to pollen  Controlled with as needed Flonase and Zyrtec.  - Annual Wellness Visit - Includes PPPS Subsequent  ()    15. Gastroesophageal reflux disease without esophagitis  Controlled.  Continue omeprazole.  - Annual Wellness Visit - Includes PPPS Subsequent ()    16. Primary insomnia  Controlled with Benadryl at night.  - Annual Wellness Visit - Includes PPPS Subsequent ()    17. Bilateral hip pain  Mild, exacerbated with walking.  - Annual Wellness Visit - Includes PPPS Subsequent ()        Services suggested: No services needed at this time  Health Care Screening recommendations as per orders if indicated.  Referrals offered: PT/OT/Nutrition counseling/Behavioral Health/Smoking cessation as per orders if indicated.    Discussion today about general wellness and lifestyle habits:    · Prevent falls and reduce trip hazards; Cautioned about securing or removing rugs.  · Have a working fire alarm and carbon monoxide detector;   · Engage in regular physical activity and social activities       Follow-up: Return in about 1 year (around 6/5/2019) for Annual.

## 2018-06-06 RX ORDER — OMEPRAZOLE 20 MG/1
20 CAPSULE, DELAYED RELEASE ORAL
Qty: 90 CAP | Refills: 3 | Status: SHIPPED | OUTPATIENT
Start: 2018-06-06 | End: 2019-08-19 | Stop reason: SDUPTHER

## 2018-06-06 RX ORDER — LOSARTAN POTASSIUM 50 MG/1
50 TABLET ORAL
Qty: 90 TAB | Refills: 3 | Status: SHIPPED | OUTPATIENT
Start: 2018-06-06 | End: 2019-08-19 | Stop reason: SDUPTHER

## 2018-08-18 ENCOUNTER — OFFICE VISIT (OUTPATIENT)
Dept: URGENT CARE | Facility: PHYSICIAN GROUP | Age: 78
End: 2018-08-18
Payer: MEDICARE

## 2018-08-18 ENCOUNTER — HOSPITAL ENCOUNTER (OUTPATIENT)
Dept: RADIOLOGY | Facility: MEDICAL CENTER | Age: 78
End: 2018-08-18
Attending: FAMILY MEDICINE
Payer: MEDICARE

## 2018-08-18 VITALS
SYSTOLIC BLOOD PRESSURE: 140 MMHG | RESPIRATION RATE: 14 BRPM | BODY MASS INDEX: 25.28 KG/M2 | DIASTOLIC BLOOD PRESSURE: 72 MMHG | OXYGEN SATURATION: 95 % | HEART RATE: 81 BPM | HEIGHT: 74 IN | WEIGHT: 197 LBS | TEMPERATURE: 99.1 F

## 2018-08-18 DIAGNOSIS — J01.01 ACUTE RECURRENT MAXILLARY SINUSITIS: ICD-10-CM

## 2018-08-18 DIAGNOSIS — R50.9 FEVER, UNSPECIFIED FEVER CAUSE: ICD-10-CM

## 2018-08-18 DIAGNOSIS — R05.9 COUGH: ICD-10-CM

## 2018-08-18 PROCEDURE — 99214 OFFICE O/P EST MOD 30 MIN: CPT | Performed by: FAMILY MEDICINE

## 2018-08-18 PROCEDURE — 71046 X-RAY EXAM CHEST 2 VIEWS: CPT

## 2018-08-18 RX ORDER — DOXYCYCLINE HYCLATE 100 MG
100 TABLET ORAL 2 TIMES DAILY
Qty: 20 TAB | Refills: 0 | Status: SHIPPED | OUTPATIENT
Start: 2018-08-18 | End: 2018-08-28

## 2018-08-18 ASSESSMENT — ENCOUNTER SYMPTOMS
MYALGIAS: 0
WEIGHT LOSS: 0
EYE REDNESS: 0
SORE THROAT: 0
EYE DISCHARGE: 0

## 2018-08-18 NOTE — PROGRESS NOTES
"Subjective:      Ross Mcneal is a 78 y.o. male who presents with Sinus Problem (sinus congestion, cough x 7 days)            HPI    ROS       Objective:     /72   Pulse 81   Temp 37.3 °C (99.1 °F)   Resp 14   Ht 1.88 m (6' 2\")   Wt 89.4 kg (197 lb)   SpO2 95%   BMI 25.29 kg/m²      Physical Exam            Assessment/Plan:     There are no diagnoses linked to this encounter.      "

## 2018-08-18 NOTE — PROGRESS NOTES
"Subjective:      Ross Mcneal is a 78 y.o. male who presents with Sinus Problem (sinus congestion, cough x 7 days)            1 week productive cough without blood in sputum. Fever to 101F last night. No SOB/wheeze. No PMH asthma/COPD. Remote PMH pneumonia as child treated as outpatient. Maxillary sinus pressure and drainage. +PMH sinusitis and sinus surgery. No other aggravating or alleviating factors. OTC sudafed with some relief of moderate and progressively worse sx's. No other aggravating or alleviating factors.            Review of Systems   Constitutional: Positive for malaise/fatigue. Negative for weight loss.   HENT: Negative for ear discharge, ear pain and sore throat.    Eyes: Negative for discharge and redness.   Cardiovascular: Negative for leg swelling.   Musculoskeletal: Negative for joint pain and myalgias.   Skin: Negative for itching and rash.     .  Medications, Allergies, and current problem list reviewed today in Epic       Objective:     /72   Pulse 81   Temp 37.3 °C (99.1 °F)   Resp 14   Ht 1.88 m (6' 2\")   Wt 89.4 kg (197 lb)   SpO2 95%   BMI 25.29 kg/m²      Physical Exam   Constitutional: He is oriented to person, place, and time. He appears well-developed and well-nourished. No distress.   HENT:   Head: Normocephalic and atraumatic.   Eyes: Conjunctivae are normal.   Neck: Neck supple.   Cardiovascular: Normal rate, regular rhythm and normal heart sounds.    Pulmonary/Chest: Effort normal and breath sounds normal. He has no wheezes.   Scattered rhonchi.  Clears with cough.   Lymphadenopathy:     He has no cervical adenopathy.   Neurological: He is alert and oriented to person, place, and time.   Skin: Skin is warm and dry. No rash noted.               Assessment/Plan:   Chest x-ray per radiology:  1.  No acute cardiopulmonary abnormality identified.    2.  Minimal left basilar atelectasis    3.  chronic obstructive pulmonary disease    4.  Cardiac pacemaker present. " Prior sternotomy.    1. Acute recurrent maxillary sinusitis  doxycycline (VIBRAMYCIN) 100 MG Tab   2. Cough  DX-CHEST-2 VIEWS    doxycycline (VIBRAMYCIN) 100 MG Tab   3. Fever, unspecified fever cause  DX-CHEST-2 VIEWS    doxycycline (VIBRAMYCIN) 100 MG Tab     Differential diagnosis, natural history, supportive care, and indications for immediate follow-up discussed at length.     Significant fever yesterday evening is concerning for bacterial process which could include sinusitis or early pneumonia.  Will treat with doxycycline.  He will continue nasal saline, decongestant, Flonase.

## 2018-08-27 ENCOUNTER — TELEPHONE (OUTPATIENT)
Dept: MEDICAL GROUP | Facility: MEDICAL CENTER | Age: 78
End: 2018-08-27

## 2018-08-27 NOTE — TELEPHONE ENCOUNTER
ESTABLISHED PATIENT PRE-VISIT PLANNING     Note: Patient will not be contacted if there is no indication to call.     1.  Reviewed notes from the last few office visits within the medical group: Yes 6/5/18    2.  If any orders were placed at last visit or intended to be done for this visit (i.e. 6 mos follow-up), do we have Results/Consult Notes?        •  Labs - Labs were not ordered at last office visit.   Note: If patient appointment is for lab review and patient did not complete labs, check with provider if OK to reschedule patient until labs completed.       •  Imaging - Imaging was not ordered at last office visit.       •  Referrals - No referrals were ordered at last office visit.    3. Is this appointment scheduled as a Hospital Follow-Up? Yes, visit was at [facility].     4.  Immunizations were updated in [place] using WebIZ?: Yes       •  Web Iz Recommendations: FLU, TDAP and ZOSTAVAX (Shingles)    5.  Patient is due for the following Health Maintenance Topics:   Health Maintenance Due   Topic Date Due   • IMM DTaP/Tdap/Td Vaccine (1 - Tdap) 03/08/1959   • IMM ZOSTER VACCINES (2 of 3) 06/19/2007   • IMM INFLUENZA (1) 09/01/2018     6.  MDX printed for Provider? NO    7.  Patient was NOT informed to arrive 15 min prior to their scheduled appointment and bring in their medication bottles.

## 2018-08-28 ENCOUNTER — OFFICE VISIT (OUTPATIENT)
Dept: MEDICAL GROUP | Facility: MEDICAL CENTER | Age: 78
End: 2018-08-28
Payer: MEDICARE

## 2018-08-28 VITALS
DIASTOLIC BLOOD PRESSURE: 78 MMHG | HEART RATE: 80 BPM | SYSTOLIC BLOOD PRESSURE: 136 MMHG | WEIGHT: 190.4 LBS | BODY MASS INDEX: 24.43 KG/M2 | OXYGEN SATURATION: 96 % | HEIGHT: 74 IN | TEMPERATURE: 97.9 F

## 2018-08-28 DIAGNOSIS — R05.9 COUGH: ICD-10-CM

## 2018-08-28 PROCEDURE — 99214 OFFICE O/P EST MOD 30 MIN: CPT | Performed by: FAMILY MEDICINE

## 2018-08-28 NOTE — ASSESSMENT & PLAN NOTE
Patient took a trip to [place] about 2 weeks ago.  Upon leaving to go back home, he developed a upper respiratory infection.  When he got back home his symptoms worsen.  Patient was seen at the urgent care and started on doxycycline for a sinusitis.  Today is day 10 of doxycycline, last day.  He states that his cough has improved, he is able to sleep last night without coughing, which was the first time since getting sick.  He is using Flonase.  Sudafed is helpful.  Patient also had a chest x-ray which did not show any pneumonia but his lungs were suspicious for COPD.  He has no smoking history.

## 2018-08-28 NOTE — PROGRESS NOTES
Subjective:   Ross Mcneal is a 78 y.o. male here today for cough    Cough  Patient took a trip to [place] about 2 weeks ago.  Upon leaving to go back home, he developed a upper respiratory infection.  When he got back home his symptoms worsen.  Patient was seen at the urgent care and started on doxycycline for a sinusitis.  Today is day 10 of doxycycline, last day.  He states that his cough has improved, he is able to sleep last night without coughing, which was the first time since getting sick.  He is using Flonase.  Sudafed is helpful.  Patient also had a chest x-ray which did not show any pneumonia but his lungs were suspicious for COPD.  He has no smoking history.         Current medicines (including changes today)  Current Outpatient Prescriptions   Medication Sig Dispense Refill   • doxycycline (VIBRAMYCIN) 100 MG Tab Take 1 Tab by mouth 2 times a day for 10 days. 20 Tab 0   • omeprazole (PRILOSEC) 20 MG delayed-release capsule Take 1 Cap by mouth every day. 90 Cap 3   • losartan (COZAAR) 50 MG Tab Take 1 Tab by mouth every day. 90 Tab 3   • metoprolol SR (TOPROL XL) 25 MG TABLET SR 24 HR TAKE 1 TABLET BY MOUTH 1/2  HOUR AFTER DINNER AS  DIRECTED 90 Tab 0   • Cetirizine HCl (ZYRTEC ALLERGY) 10 MG Cap Take 10 mg by mouth as needed.     • fluticasone (FLONASE) 50 MCG/ACT nasal spray Spray 2 Sprays in nose every day. (Patient taking differently: Spray 2 Sprays in nose as needed.) 1 Bottle 0   • Cholecalciferol (VITAMIN D) 400 UNIT TABS Take 400 Units by mouth every day.     • DiphenhydrAMINE HCl (BENADRYL ALLERGY PO) Take  by mouth as needed. As needed for sleep     • aspirin 81 MG tablet Take 81 mg by mouth every day.       No current facility-administered medications for this visit.      He  has a past medical history of Basal cell carcinoma of lip (11/27/2013); Biventricular cardiac pacemaker in situ (3/4/2015); Breath shortness; Bronchitis; Cancer ([facility]) (2006); Cold (12/2014); GERD  "(gastroesophageal reflux disease) (10/17/2013); Heart burn; Hypertension ( ); Indigestion; Insomnia (10/17/2013); LBBB (left bundle branch block) ( ); MEDICAL HOME; Pneumonia; Positional lightheadedness (3/4/2015); Prostate cancer (HCC) ( ); PVCs (premature ventricular contractions) (3/4/2015); S/P AVR (aortic valve replacement) (1/2000); S/P AVR (aortic valve replacement) (2/21/2013); S/P prostatectomy (2003); S/P thoracic aortic aneurysm repair (2/21/2013); and Seasonal allergies (10/17/2013).    ROS   No chest pain, no shortness of breath       Objective:     Blood pressure 136/78, pulse 80, temperature 36.6 °C (97.9 °F), height 1.88 m (6' 2\"), weight 86.4 kg (190 lb 6.4 oz), SpO2 96 %. Body mass index is 24.45 kg/m².   Physical Exam:  Constitutional: Alert, no distress.  Skin: Warm, dry, good turgor, no rashes in visible areas.  Eye: Equal, round and reactive, conjunctiva clear, lids normal.  Respiratory: Unlabored respiratory effort, lungs clear to auscultation, no wheezes, no ronchi.  Psych: Alert and oriented x3, normal affect and mood.        Assessment and Plan:   The following treatment plan was discussed    1. Cough  Most likely related to postnasal drainage.  Advised patient to continue with Flonase and discussed expected course.  Advised patient to complete antibiotics as prescribed.  Advised that diagnosing COPD is best done with pulmonary function testing.  Reviewed x-ray results with patient and symptoms with patient, suspicion for COPD is low, he may have mild COPD at worst.  Patient will defer on pulmonary function testing right now with clear sounding lungs and no COPD symptoms.      Followup: Return if symptoms worsen or fail to improve.         "

## 2018-09-27 ENCOUNTER — OFFICE VISIT (OUTPATIENT)
Dept: CARDIOLOGY | Facility: MEDICAL CENTER | Age: 78
End: 2018-09-27
Payer: MEDICARE

## 2018-09-27 ENCOUNTER — NON-PROVIDER VISIT (OUTPATIENT)
Dept: CARDIOLOGY | Facility: MEDICAL CENTER | Age: 78
End: 2018-09-27
Payer: MEDICARE

## 2018-09-27 VITALS
OXYGEN SATURATION: 96 % | WEIGHT: 192 LBS | BODY MASS INDEX: 24.64 KG/M2 | RESPIRATION RATE: 14 BRPM | HEIGHT: 74 IN | HEART RATE: 68 BPM | SYSTOLIC BLOOD PRESSURE: 122 MMHG | DIASTOLIC BLOOD PRESSURE: 80 MMHG

## 2018-09-27 DIAGNOSIS — Z86.79 S/P THORACIC AORTIC ANEURYSM REPAIR: ICD-10-CM

## 2018-09-27 DIAGNOSIS — Z98.890 S/P THORACIC AORTIC ANEURYSM REPAIR: ICD-10-CM

## 2018-09-27 DIAGNOSIS — I10 ESSENTIAL HYPERTENSION, BENIGN: ICD-10-CM

## 2018-09-27 DIAGNOSIS — Z95.0 BIVENTRICULAR CARDIAC PACEMAKER IN SITU: ICD-10-CM

## 2018-09-27 DIAGNOSIS — Z95.2 S/P AVR (AORTIC VALVE REPLACEMENT): ICD-10-CM

## 2018-09-27 DIAGNOSIS — I34.0 MITRAL VALVE INSUFFICIENCY, UNSPECIFIED ETIOLOGY: ICD-10-CM

## 2018-09-27 LAB — EKG IMPRESSION: NORMAL

## 2018-09-27 PROCEDURE — 93000 ELECTROCARDIOGRAM COMPLETE: CPT | Performed by: INTERNAL MEDICINE

## 2018-09-27 PROCEDURE — 99214 OFFICE O/P EST MOD 30 MIN: CPT | Mod: 25 | Performed by: INTERNAL MEDICINE

## 2018-09-27 PROCEDURE — 93281 PM DEVICE PROGR EVAL MULTI: CPT | Performed by: INTERNAL MEDICINE

## 2018-09-27 ASSESSMENT — ENCOUNTER SYMPTOMS
SHORTNESS OF BREATH: 0
PND: 0
DIZZINESS: 0
PALPITATIONS: 0
LOSS OF CONSCIOUSNESS: 0
MYALGIAS: 0
ORTHOPNEA: 0
COUGH: 0

## 2018-09-27 NOTE — PROGRESS NOTES
Chief Complaint   Patient presents with   • Cardiomyopathy (Non-ischemic)       Subjective:   Ross Mcneal is a 78 y.o. 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    Last seen on 5/4/2018.    Since 5/4/2018 the patient has had no cardiac symptoms.  The patient and his wife became ill after returning from their first trip from [facility].  Had respiratory problems and required 2 courses of antibiotics the last one being completed just a week ago.  The patient feels that he is recovering with some minimal fatigue.     Since 10/12/2017 the patient has had no cardiac symptoms.  No heart failure symptoms, palpitations or dizziness.  AICD device functioning normally.     Since 3/16/2017 appointment the patient's had no cardiac symptoms.  Compliant with his medications.     Since 9/15/2016 appointment no cardiac symptoms.  Continues to stay physically active without any difficulty.  Plans a cruise in April.     Between 12/29/2014-12/30/2014 hospitalized [facility].  Successfully underwent biventricular pacemaker implantation by Dr. Jam Lacy.  Indications were syncope/collapse with first degree AV block and a left bundle branch block.  Has recovered well.  Biggest improvement the patient states that first, no more syncope second, no more positional dizziness, lightheadedness or near syncope when he gets up from a sitting position.  Has had chronic palpitations with skipping beats.  Has not changed.  Usually picks up on his blood pressure monitored.     Monitors his blood pressure daily and his blood pressure log indicates excellent control of his blood pressure.     Past Medical History  November, 2014 while in [place] at a conference, the patient  "was walking down the hotel hallway carrying some milk and the next thing he remembers he was on the floor looking up.  Had no injury.  No warning symptoms.  Did not seek medical attention.  After returning home he noticed his blood pressure was elevated at 185 systolic.  Contacted the on-call doctor. Diovan was increased from 80 to 160 mg daily in addition to HCTZ.  He has had some positional lightheadedness.  His carefully monitored his blood pressure.     The patient his wife recently moved to [place] in October, 2012.  The patient went back to [place], [place] undergo redo AVR and ascending aortic aneurysm repair at [facility] in [facility] on 2/21/2013.  Preoperative cardiac catheterization demonstrated that his coronary arteries \"were all patent\".  Postoperatively as well.  Had postoperative atrial fibrillation on amiodarone which as been discontinued.       Past Medical History:   Diagnosis Date   • Basal cell carcinoma of lip 11/27/2013   • Biventricular cardiac pacemaker in situ 3/4/2015   • Breath shortness    • Bronchitis    • Cancer (HCC) 2006    prostate   • Cold 12/2014    sinus infection; no antibiotics   • GERD (gastroesophageal reflux disease) 10/17/2013   • Heart burn    • Hypertension     • Indigestion    • Insomnia 10/17/2013   • LBBB (left bundle branch block)     • MEDICAL HOME    • Pneumonia    • Positional lightheadedness 3/4/2015   • Prostate cancer (HCC)     • PVCs (premature ventricular contractions) 3/4/2015   • S/P AVR (aortic valve replacement) 1/2000    [place], [place]   • S/P AVR (aortic valve replacement) 2/21/2013    redo; [place], [place]   • S/P prostatectomy 2003   • S/P thoracic aortic aneurysm repair 2/21/2013   • Seasonal allergies 10/17/2013     Past Surgical History:   Procedure Laterality Date   • RECOVERY  12/29/2014    Performed by Cath-Recovery Surgery at SURGERY SAME DAY [facility]" ORS   • AORTIC VALVE REPLACEMENT  13    Bovine tissur valve with ascending aortic repair..  Done in [place].   • CARDIAC CATH  2013    normal coronaries prior to AVR.   • OTHER      carcinoma removed from lip   • APPENDECTOMY     • OTHER CARDIAC SURGERY   and     aVR. Thoracic aortic aneurysm repair.   • PROSTATECTOMY, RADIAL  .    prostate cancer.     Family History   Problem Relation Age of Onset   • Other Mother          at age 82. Parkinson's disease.   • Cancer Father          at age 68. Prostate cancer.   • Cancer Sister         Lung     Social History     Social History   • Marital status:      Spouse name: N/A   • Number of children: N/A   • Years of education: N/A     Occupational History   • Not on file.     Social History Main Topics   • Smoking status: Never Smoker   • Smokeless tobacco: Never Used   • Alcohol use 2.5 oz/week     5 Glasses of wine per week      Comment: a week    • Drug use: No   • Sexual activity: Yes     Partners: Female     Other Topics Concern   • Not on file     Social History Narrative    Retired . . Moved to [place] in .     No Known Allergies  Outpatient Encounter Prescriptions as of 2018   Medication Sig Dispense Refill   • omeprazole (PRILOSEC) 20 MG delayed-release capsule Take 1 Cap by mouth every day. 90 Cap 3   • losartan (COZAAR) 50 MG Tab Take 1 Tab by mouth every day. 90 Tab 3   • metoprolol SR (TOPROL XL) 25 MG TABLET SR 24 HR TAKE 1 TABLET BY MOUTH 1/2  HOUR AFTER DINNER AS  DIRECTED (Patient taking differently: TAKE 1 TABLET BY MOUTH 1/2  HOUR AFTER DINNER or as DIRECTED) 90 Tab 0   • Cetirizine HCl (ZYRTEC ALLERGY) 10 MG Cap Take 10 mg by mouth as needed.     • fluticasone (FLONASE) 50 MCG/ACT nasal spray Spray 2 Sprays in nose every day. (Patient taking differently: Spray 2 Sprays in nose as needed.) 1 Bottle 0   • Cholecalciferol (VITAMIN D) 400 UNIT TABS Take 400 Units by mouth every day.     •  "DiphenhydrAMINE HCl (BENADRYL ALLERGY PO) Take 1 Each by mouth as needed. As needed for sleep      • aspirin 81 MG tablet Take 81 mg by mouth every day.       No facility-administered encounter medications on file as of 9/27/2018.      Review of Systems   Respiratory: Negative for cough and shortness of breath.    Cardiovascular: Negative for chest pain, palpitations, orthopnea, leg swelling and PND.   Musculoskeletal: Negative for myalgias.   Neurological: Negative for dizziness and loss of consciousness.        Objective:   /80 (BP Location: Left arm, Patient Position: Sitting, BP Cuff Size: Adult)   Pulse 68   Resp 14   Ht 1.88 m (6' 2\")   Wt 87.1 kg (192 lb)   SpO2 96%   BMI 24.65 kg/m²     Physical Exam   Constitutional: He is oriented to person, place, and time. He appears well-developed and well-nourished. No distress.   Neck: No JVD present.   Cardiovascular: Normal rate, regular rhythm and intact distal pulses.  Exam reveals no gallop and no friction rub.    Murmur heard.  Pulmonary/Chest: Effort normal and breath sounds normal. No respiratory distress. He has no wheezes. He has no rales.   Midline sternal scar.  Pacemaker generator in place.   Abdominal: Soft. He exhibits no distension and no mass. There is no tenderness.   Musculoskeletal: He exhibits no edema.   Neurological: He is alert and oriented to person, place, and time.   Skin: Skin is warm and dry.   Psychiatric: He has a normal mood and affect. His behavior is normal.     02/27/2013 ECHOCARDIOGRAM  EF 30-40%. Aortic valve prosthesis. 1-2+ MR. 2+ TR.     12/12/2014 ECHOCARDIOGRAM  Pt has a prosthetic aortic valve with normal motion.  Transvalvular gradients are - Peak: 20 mmHg Mean: 8 mmHg.  Myxomatous change with prolapse of the both mitral leaflet was present.  Eccentric, more than one jets of mild to moderate mitral regurgitation.  Mild to moderately dilated left atrium.  Normal left ventricular chamber size.  Mild concentric left " ventricular hypertrophy.  Difficult to determine systolic function due to irregular heart rhythm   but appeared preserved.  Septal bounces probably from conduction abnormality (LBBB).  Normal right ventricular systolic function.  Unable to estimate pulmonary artery pressure due to an inadequate   tricuspid regurgitant jet.     11/11/2015 ECHOCARDIOGRAM  Bovine bioprosthetic aortic valve; normal function.  Normal left ventricular systolic function.  Left ventricular ejection fraction is visually estimated to be 70%.  Asymmetric septal hypertrophy.  Mitral valve prolapse of anterior and posterior leaflets with   myxomatous changes.  Moderate eccentric mitral regurgitation.  Right heart pressures are normal.     11/01/2017 ECHOCARDIOGRAM   Normal left   ventricular systolic function.  Left ventricular ejection fraction is visually estimated to be 70%.  Myxomatous mitral valve leaflet thickening with prolapse of the   anterior and posterior leaflets.  Severe eccentric mitral regurgitation.  Normal function of mechanical prosthetic valve.  Right heart pressures are normal.  Pacer/ICD wire seen in right ventricle.     05/08/2013 EKG: Normal sinus rhythm, rate 67. First degree AV block. Left bundle branch block. Unchanged since 2/22/2013.     12/08/2014 EKG: Normal sinus rhythm, rate 92. First degree AV block. Left bundle branch block. Intermittent left anterior hemiblock. Isolated PVCs.    Assessment:     1. S/P AVR (aortic valve replacement)     2. S/P thoracic aortic aneurysm repair     3. Biventricular cardiac pacemaker in situ     4. Essential hypertension, benign     5. Mitral valve insufficiency, unspecified etiology         Medical Decision Making:  Today's Assessment / Status / Plan:   S/P redo AVR/ascending aortic aneurysm repair. 2/22/2013. [facility]. Severe aortic insufficiency. Clinically stable.     AVR. 2000. Critical aortic stenosis. [facility]  [place], [place]     Mitral regurgitation.  Severe.     Myxomatous mitral valve disease.     Biventricular pacemaker. 12/29/2014. Syncope unknown etiology. Functioning normally.     Hypertension. Blood pressure controlled on outpatient measurements.     Cardiomyopathy. Resolved post biventricular AICD.     Syncope. 2014. No recurrence.     Palpitations. Chronic. Related to PVCs now controlled.     PVCs. Chronic.     LBBB. Chronic.     Recommendation/Discussion  1.   The patient appears asymptomatic in reference to his mitral regurgitation.  2.  Repeat echocardiogram.  3.  We will consider TRAVIS based on the repeat echocardiogram.  4.  Biventricular pacemaker device interrogation today.  5.  Follow-up 4 months.

## 2018-10-15 RX ORDER — METOPROLOL SUCCINATE 25 MG/1
TABLET, EXTENDED RELEASE ORAL
Qty: 90 TAB | Refills: 3 | Status: SHIPPED | OUTPATIENT
Start: 2018-10-15 | End: 2019-08-19 | Stop reason: SDUPTHER

## 2018-10-17 ENCOUNTER — HOSPITAL ENCOUNTER (OUTPATIENT)
Dept: CARDIOLOGY | Facility: MEDICAL CENTER | Age: 78
End: 2018-10-17
Attending: INTERNAL MEDICINE
Payer: MEDICARE

## 2018-10-17 DIAGNOSIS — Z95.2 S/P AVR (AORTIC VALVE REPLACEMENT): ICD-10-CM

## 2018-10-17 DIAGNOSIS — I34.0 MITRAL VALVE INSUFFICIENCY, UNSPECIFIED ETIOLOGY: ICD-10-CM

## 2018-10-17 PROCEDURE — 93306 TTE W/DOPPLER COMPLETE: CPT

## 2018-10-17 PROCEDURE — 93306 TTE W/DOPPLER COMPLETE: CPT | Mod: 26 | Performed by: INTERNAL MEDICINE

## 2018-10-18 LAB
LV EJECT FRACT  99904: 65
LV EJECT FRACT MOD 2C 99903: 68.08
LV EJECT FRACT MOD 4C 99902: 71.04
LV EJECT FRACT MOD BP 99901: 69.99

## 2019-01-30 ENCOUNTER — TELEPHONE (OUTPATIENT)
Dept: CARDIOLOGY | Facility: MEDICAL CENTER | Age: 79
End: 2019-01-30

## 2019-01-30 ENCOUNTER — OFFICE VISIT (OUTPATIENT)
Dept: CARDIOLOGY | Facility: MEDICAL CENTER | Age: 79
End: 2019-01-30
Payer: MEDICARE

## 2019-01-30 VITALS
BODY MASS INDEX: 25.28 KG/M2 | DIASTOLIC BLOOD PRESSURE: 70 MMHG | HEART RATE: 74 BPM | HEIGHT: 74 IN | WEIGHT: 197 LBS | OXYGEN SATURATION: 95 % | SYSTOLIC BLOOD PRESSURE: 122 MMHG

## 2019-01-30 DIAGNOSIS — R06.09 DYSPNEA ON EXERTION: ICD-10-CM

## 2019-01-30 DIAGNOSIS — Z98.890 S/P THORACIC AORTIC ANEURYSM REPAIR: ICD-10-CM

## 2019-01-30 DIAGNOSIS — I34.1 MVP (MITRAL VALVE PROLAPSE): ICD-10-CM

## 2019-01-30 DIAGNOSIS — I34.0 MITRAL VALVE INSUFFICIENCY, UNSPECIFIED ETIOLOGY: ICD-10-CM

## 2019-01-30 DIAGNOSIS — Z86.79 S/P THORACIC AORTIC ANEURYSM REPAIR: ICD-10-CM

## 2019-01-30 DIAGNOSIS — Z95.0 BIVENTRICULAR CARDIAC PACEMAKER IN SITU: ICD-10-CM

## 2019-01-30 DIAGNOSIS — Z95.2 S/P AVR (AORTIC VALVE REPLACEMENT): ICD-10-CM

## 2019-01-30 PROCEDURE — 99215 OFFICE O/P EST HI 40 MIN: CPT | Performed by: INTERNAL MEDICINE

## 2019-01-30 RX ORDER — AMLODIPINE BESYLATE 5 MG/1
5 TABLET ORAL
COMMUNITY
Start: 2012-03-13 | End: 2019-02-11

## 2019-01-30 RX ORDER — VALSARTAN AND HYDROCHLOROTHIAZIDE 320; 12.5 MG/1; MG/1
1 TABLET, FILM COATED ORAL
COMMUNITY
Start: 2012-03-13 | End: 2019-02-11

## 2019-01-30 RX ORDER — SILDENAFIL 100 MG/1
100 TABLET, FILM COATED ORAL
COMMUNITY
Start: 2011-07-11 | End: 2019-02-11

## 2019-01-30 RX ORDER — CETIRIZINE HYDROCHLORIDE 10 MG/1
TABLET ORAL
COMMUNITY
Start: 2007-04-12 | End: 2019-02-11

## 2019-01-30 ASSESSMENT — ENCOUNTER SYMPTOMS
SHORTNESS OF BREATH: 0
DIZZINESS: 0
MYALGIAS: 0
COUGH: 0
ORTHOPNEA: 0
PND: 0
PALPITATIONS: 0
LOSS OF CONSCIOUSNESS: 0

## 2019-01-30 NOTE — TELEPHONE ENCOUNTER
Patient scheduled for TRAVIS on 2-12-19 at [facility] with Dr. Conte at 1:00. FYI to Dr. Conte.

## 2019-01-30 NOTE — PROGRESS NOTES
Chief Complaint   Patient presents with   •  AVR Mitral Regurgitation     Follow up       Subjective:   Ross Mcneal is a 78 y.o. 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    Last seen on 9/27/2018.    Since 9/27/2018 appointment the patient reports exertional shortness of breath and chest discomfort.  No PND, orthopnea, edema or palpitations.    Since 5/4/2018 the patient has had no cardiac symptoms.  The patient and his wife became ill after returning from their first trip from [facility].  Had respiratory problems and required 2 courses of antibiotics the last one being completed just a week ago.  The patient feels that he is recovering with some minimal fatigue.     Since 10/12/2017 the patient has had no cardiac symptoms.  No heart failure symptoms, palpitations or dizziness.  AICD device functioning normally.     Since 3/16/2017 appointment the patient's had no cardiac symptoms.  Compliant with his medications.     Since 9/15/2016 appointment no cardiac symptoms.  Continues to stay physically active without any difficulty.  Plans a cruise in April.     Between 12/29/2014-12/30/2014 hospitalized [facility].  Successfully underwent biventricular pacemaker implantation by Dr. Jam Lacy.  Indications were syncope/collapse with first degree AV block and a left bundle branch block.  Has recovered well.  Biggest improvement the patient states that first, no more syncope second, no more positional dizziness, lightheadedness or near syncope when he gets up from a sitting position.  Has had chronic palpitations with skipping beats.  Has not changed.  Usually picks up on his blood pressure monitored.     Monitors his blood pressure daily and his  "blood pressure log indicates excellent control of his blood pressure.     Past Medical History  November, 2014 while in [place] at a conference, the patient was walking down the hotel hallway carrying some milk and the next thing he remembers he was on the floor looking up.  Had no injury.  No warning symptoms.  Did not seek medical attention.  After returning home he noticed his blood pressure was elevated at 185 systolic.  Contacted the on-call doctor. Diovan was increased from 80 to 160 mg daily in addition to HCTZ.  He has had some positional lightheadedness.  His carefully monitored his blood pressure.     The patient his wife recently moved to [place] in October, 2012.  The patient went back to [place], [place] undergo redo AVR and ascending aortic aneurysm repair at [facility] in [facility] on 2/21/2013.  Preoperative cardiac catheterization demonstrated that his coronary arteries \"were all patent\".  Postoperatively as well.  Had postoperative atrial fibrillation on amiodarone which as been discontinued.       Past Medical History:   Diagnosis Date   • Basal cell carcinoma of lip 11/27/2013   • Biventricular cardiac pacemaker in situ 3/4/2015   • Breath shortness    • Bronchitis    • Cancer (HCC) 2006    prostate   • Cold 12/2014    sinus infection; no antibiotics   • GERD (gastroesophageal reflux disease) 10/17/2013   • Heart burn    • Hypertension     • Indigestion    • Insomnia 10/17/2013   • LBBB (left bundle branch block)     • MEDICAL HOME    • Pneumonia    • Positional lightheadedness 3/4/2015   • Prostate cancer (HCC)     • PVCs (premature ventricular contractions) 3/4/2015   • S/P AVR (aortic valve replacement) 1/2000    [place], [place]   • S/P AVR (aortic valve replacement) 2/21/2013    redo; [place], [place]   • S/P prostatectomy 2003   • S/P thoracic aortic aneurysm repair 2/21/2013   • Seasonal allergies " 10/17/2013     Past Surgical History:   Procedure Laterality Date   • RECOVERY  2014    Performed by Cath-Recovery Surgery at SURGERY SAME DAY [facility] ORS   • AORTIC VALVE REPLACEMENT  13    Bovine tissur valve with ascending aortic repair..  Done in [place].   • CARDIAC CATH  2013    normal coronaries prior to AVR.   • OTHER      carcinoma removed from lip   • APPENDECTOMY     • OTHER CARDIAC SURGERY   and     aVR. Thoracic aortic aneurysm repair.   • PROSTATECTOMY, RADIAL  .    prostate cancer.     Family History   Problem Relation Age of Onset   • Other Mother          at age 82. Parkinson's disease.   • Cancer Father          at age 68. Prostate cancer.   • Cancer Sister         Lung     Social History     Social History   • Marital status:      Spouse name: N/A   • Number of children: N/A   • Years of education: N/A     Occupational History   • Not on file.     Social History Main Topics   • Smoking status: Never Smoker   • Smokeless tobacco: Never Used   • Alcohol use 2.5 oz/week     5 Glasses of wine per week      Comment: a week    • Drug use: No   • Sexual activity: Yes     Partners: Female     Other Topics Concern   • Not on file     Social History Narrative    Retired . . Moved to [place] in .     No Known Allergies  Outpatient Encounter Prescriptions as of 2019   Medication Sig Dispense Refill   • metoprolol SR (TOPROL XL) 25 MG TABLET SR 24 HR TAKE 1 TABLET BY MOUTH 1/2  HOUR AFTER DINNER AS  DIRECTED 90 Tab 3   • omeprazole (PRILOSEC) 20 MG delayed-release capsule Take 1 Cap by mouth every day. 90 Cap 3   • losartan (COZAAR) 50 MG Tab Take 1 Tab by mouth every day. 90 Tab 3   • Cetirizine HCl (ZYRTEC ALLERGY) 10 MG Cap Take 10 mg by mouth as needed.     • fluticasone (FLONASE) 50 MCG/ACT nasal spray Spray 2 Sprays in nose every day. (Patient taking differently: Spray 2 Sprays in nose as needed.) 1 Bottle 0   • Cholecalciferol  "(VITAMIN D) 400 UNIT TABS Take 400 Units by mouth every day.     • DiphenhydrAMINE HCl (BENADRYL ALLERGY PO) Take 1 Each by mouth as needed. As needed for sleep      • aspirin 81 MG tablet Take 81 mg by mouth every day.     • amLODIPine (NORVASC) 5 MG Tab Take 5 mg by mouth.     • sildenafil citrate (VIAGRA) 100 MG tablet Take 100 mg by mouth.     • valsartan-hydrochlorothiazide (DIOVAN-HCT) 320-12.5 MG per tablet Take 1 tablet by mouth.     • cetirizine (ZYRTEC ALLERGY) 10 MG Tab Take  by mouth.     • aspirin 81 MG tablet Take  by mouth.       No facility-administered encounter medications on file as of 1/30/2019.      Review of Systems   Respiratory: Negative for cough and shortness of breath.    Cardiovascular: Negative for chest pain, palpitations, orthopnea, leg swelling and PND.   Musculoskeletal: Negative for myalgias.   Neurological: Negative for dizziness and loss of consciousness.        Objective:   /70 (BP Location: Left arm, Patient Position: Sitting, BP Cuff Size: Adult)   Pulse 74   Ht 1.88 m (6' 2\")   Wt 89.4 kg (197 lb)   SpO2 95%   BMI 25.29 kg/m²     Physical Exam   Constitutional: He is oriented to person, place, and time. He appears well-developed and well-nourished. No distress.   Neck: No JVD present.   Cardiovascular: Normal rate, regular rhythm and intact distal pulses.  Exam reveals no gallop and no friction rub.    Murmur heard.  Pulmonary/Chest: Effort normal and breath sounds normal. No respiratory distress. He has no wheezes. He has no rales.   Midline sternal scar.  Pacemaker generator in place.   Abdominal: Soft. He exhibits no distension and no mass. There is no tenderness.   Musculoskeletal: He exhibits no edema.   Neurological: He is alert and oriented to person, place, and time.   Skin: Skin is warm and dry.   Psychiatric: He has a normal mood and affect. His behavior is normal.     02/27/2013 ECHOCARDIOGRAM  EF 30-40%. Aortic valve prosthesis. 1-2+ MR. 2+ TR.   "   12/12/2014 ECHOCARDIOGRAM  Pt has a prosthetic aortic valve with normal motion.  Transvalvular gradients are - Peak: 20 mmHg Mean: 8 mmHg.  Myxomatous change with prolapse of the both mitral leaflet was present.  Eccentric, more than one jets of mild to moderate mitral regurgitation.  Mild to moderately dilated left atrium.  Normal left ventricular chamber size.  Mild concentric left ventricular hypertrophy.  Difficult to determine systolic function due to irregular heart rhythm   but appeared preserved.  Septal bounces probably from conduction abnormality (LBBB).  Normal right ventricular systolic function.  Unable to estimate pulmonary artery pressure due to an inadequate   tricuspid regurgitant jet.     11/11/2015 ECHOCARDIOGRAM  Bovine bioprosthetic aortic valve; normal function.  Normal left ventricular systolic function.  Left ventricular ejection fraction is visually estimated to be 70%.  Asymmetric septal hypertrophy.  Mitral valve prolapse of anterior and posterior leaflets with   myxomatous changes.  Moderate eccentric mitral regurgitation.  Right heart pressures are normal.     11/01/2017 ECHOCARDIOGRAM   Normal left   ventricular systolic function.  Left ventricular ejection fraction is visually estimated to be 70%.  Myxomatous mitral valve leaflet thickening with prolapse of the   anterior and posterior leaflets.  Severe eccentric mitral regurgitation.  Normal function of mechanical prosthetic valve.  Right heart pressures are normal.  Pacer/ICD wire seen in right ventricle.     05/08/2013 EKG: Normal sinus rhythm, rate 67. First degree AV block. Left bundle branch block. Unchanged since 2/22/2013.     12/08/2014 EKG: Normal sinus rhythm, rate 92. First degree AV block. Left bundle branch block. Intermittent left anterior hemiblock. Isolated PVCs.    Assessment:     1. Dyspnea on exertion  CBC WITHOUT DIFFERENTIAL    LIPID PANEL    COMP METABOLIC PANEL    TSH+FREE T4   2. Mitral valve insufficiency,  unspecified etiology     3. MVP (mitral valve prolapse)     4. Biventricular cardiac pacemaker in situ     5. S/P AVR (aortic valve replacement)     6. S/P thoracic aortic aneurysm repair         Medical Decision Making:  Today's Assessment / Status / Plan:   Exertional shortness of breath    Mitral regurgitation.  Severe.    Myxomatous mitral valve disease.    S/P redo AVR 23 mm composite pericardial tissue valve ascending aortic aneurysm repair 28 mm Valsalva sleeve graft. 2/21/2013. [facility].  Degenerated homograft AVR.  Severe aortic insufficiency. Clinically stable.     AVR.  Homograft.  2000. Critical aortic stenosis.  [place], [place]    Biventricular pacemaker. 12/29/2014. Syncope unknown etiology. Functioning normally.     Hypertension. Blood pressure controlled on outpatient measurements.     Cardiomyopathy. Resolved post biventricular AICD.     Syncope. 2014. No recurrence.     Palpitations. Chronic. Related to PVCs now controlled.     PVCs. Chronic.     LBBB. Chronic.     Recommendation/Discussion  1.   The patient appears to be now symptomatic related to his mitral regurgitation.  2.  TRAVIS.  Discussed procedure, reasons and risks.  3.  Follow-up 3 months.

## 2019-02-02 LAB
ALBUMIN SERPL-MCNC: 4.2 G/DL (ref 3.5–4.8)
ALBUMIN/GLOB SERPL: 1.4 {RATIO} (ref 1.2–2.2)
ALP SERPL-CCNC: 93 IU/L (ref 39–117)
ALT SERPL-CCNC: 19 IU/L (ref 0–44)
AST SERPL-CCNC: 27 IU/L (ref 0–40)
BILIRUB SERPL-MCNC: 0.6 MG/DL (ref 0–1.2)
BUN SERPL-MCNC: 16 MG/DL (ref 8–27)
BUN/CREAT SERPL: 13 (ref 10–24)
CALCIUM SERPL-MCNC: 9.8 MG/DL (ref 8.6–10.2)
CHLORIDE SERPL-SCNC: 107 MMOL/L (ref 96–106)
CHOLEST SERPL-MCNC: 185 MG/DL (ref 100–199)
CO2 SERPL-SCNC: 20 MMOL/L (ref 20–29)
CREAT SERPL-MCNC: 1.26 MG/DL (ref 0.76–1.27)
ERYTHROCYTE [DISTWIDTH] IN BLOOD BY AUTOMATED COUNT: 14.5 % (ref 12.3–15.4)
GLOBULIN SER CALC-MCNC: 3.1 G/DL (ref 1.5–4.5)
GLUCOSE SERPL-MCNC: 112 MG/DL (ref 65–99)
HCT VFR BLD AUTO: 43.5 % (ref 37.5–51)
HDLC SERPL-MCNC: 62 MG/DL
HGB BLD-MCNC: 15.3 G/DL (ref 13–17.7)
LABORATORY COMMENT REPORT: NORMAL
LDLC SERPL CALC-MCNC: 97 MG/DL (ref 0–99)
MCH RBC QN AUTO: 30.1 PG (ref 26.6–33)
MCHC RBC AUTO-ENTMCNC: 35.2 G/DL (ref 31.5–35.7)
MCV RBC AUTO: 86 FL (ref 79–97)
NRBC BLD AUTO-RTO: ABNORMAL %
PLATELET # BLD AUTO: 114 X10E3/UL (ref 150–379)
POTASSIUM SERPL-SCNC: 4.4 MMOL/L (ref 3.5–5.2)
PROT SERPL-MCNC: 7.3 G/DL (ref 6–8.5)
RBC # BLD AUTO: 5.08 X10E6/UL (ref 4.14–5.8)
SODIUM SERPL-SCNC: 142 MMOL/L (ref 134–144)
T4 FREE SERPL-MCNC: 1.1 NG/DL (ref 0.82–1.77)
TRIGL SERPL-MCNC: 130 MG/DL (ref 0–149)
TSH SERPL DL<=0.005 MIU/L-ACNC: 2.35 UIU/ML (ref 0.45–4.5)
VLDLC SERPL CALC-MCNC: 26 MG/DL (ref 5–40)
WBC # BLD AUTO: 5.2 X10E3/UL (ref 3.4–10.8)

## 2019-02-11 ENCOUNTER — APPOINTMENT (OUTPATIENT)
Dept: ADMISSIONS | Facility: MEDICAL CENTER | Age: 79
End: 2019-02-11
Attending: INTERNAL MEDICINE
Payer: MEDICARE

## 2019-02-12 ENCOUNTER — HOSPITAL ENCOUNTER (OUTPATIENT)
Facility: MEDICAL CENTER | Age: 79
End: 2019-02-12
Attending: INTERNAL MEDICINE | Admitting: INTERNAL MEDICINE
Payer: MEDICARE

## 2019-02-12 ENCOUNTER — APPOINTMENT (OUTPATIENT)
Dept: CARDIOLOGY | Facility: MEDICAL CENTER | Age: 79
End: 2019-02-12
Attending: INTERNAL MEDICINE
Payer: MEDICARE

## 2019-02-12 VITALS
RESPIRATION RATE: 20 BRPM | DIASTOLIC BLOOD PRESSURE: 67 MMHG | OXYGEN SATURATION: 97 % | WEIGHT: 195.55 LBS | TEMPERATURE: 97.2 F | SYSTOLIC BLOOD PRESSURE: 154 MMHG | HEART RATE: 76 BPM | HEIGHT: 74 IN | BODY MASS INDEX: 25.1 KG/M2

## 2019-02-12 DIAGNOSIS — I34.0 MITRAL VALVE INSUFFICIENCY, UNSPECIFIED ETIOLOGY: ICD-10-CM

## 2019-02-12 PROCEDURE — 700111 HCHG RX REV CODE 636 W/ 250 OVERRIDE (IP)

## 2019-02-12 PROCEDURE — 160002 HCHG RECOVERY MINUTES (STAT)

## 2019-02-12 PROCEDURE — 93321 DOPPLER ECHO F-UP/LMTD STD: CPT | Mod: 26 | Performed by: INTERNAL MEDICINE

## 2019-02-12 PROCEDURE — 93312 ECHO TRANSESOPHAGEAL: CPT | Mod: 26 | Performed by: INTERNAL MEDICINE

## 2019-02-12 PROCEDURE — 93325 DOPPLER ECHO COLOR FLOW MAPG: CPT

## 2019-02-12 PROCEDURE — 93325 DOPPLER ECHO COLOR FLOW MAPG: CPT | Mod: 26 | Performed by: INTERNAL MEDICINE

## 2019-02-12 RX ORDER — ONDANSETRON 2 MG/ML
4 INJECTION INTRAMUSCULAR; INTRAVENOUS
Status: DISCONTINUED | OUTPATIENT
Start: 2019-02-12 | End: 2019-02-12 | Stop reason: HOSPADM

## 2019-02-12 NOTE — OR NURSING
1326 patient arrived from cath lab s/p TRAVIS, patient wide awake, denies pain, vss.  1356 patient given water tolerating well.  1400  criteria met to discharge patient home.  1411 discharge instructions reviewed with patient, patient and family verbalize understanding. Copy given to patient. Iv discontinued tip intact.  1417 patient escorted via w/c with all his personal belongings.

## 2019-02-12 NOTE — DISCHARGE INSTRUCTIONS
ACTIVITY: Rest and take it easy for the first 24 hours.  A responsible adult is recommended to remain with you during that time.  It is normal to feel sleepy.  We encourage you to not do anything that requires balance, judgment or coordination.    MILD FLU-LIKE SYMPTOMS ARE NORMAL. YOU MAY EXPERIENCE GENERALIZED MUSCLE ACHES, THROAT IRRITATION, HEADACHE AND/OR SOME NAUSEA.    FOR 24 HOURS DO NOT:  Drive, operate machinery or run household appliances.  Drink beer or alcoholic beverages.   Make important decisions or sign legal documents.    SPECIAL INSTRUCTIONS:   Resume your  pre admission medications.   Follow up with their cardiologist. Call today to find out when to follow up.   Follow up with their PCP. Call today to find out when to follow up.    DIET: To avoid nausea, slowly advance diet as tolerated, avoiding spicy or greasy foods for the first day.  Add more substantial food to your diet according to your physician's instructions.  Babies can be fed formula or breast milk as soon as they are hungry.  INCREASE FLUIDS AND FIBER TO AVOID CONSTIPATION.    SURGICAL DRESSING/BATHING: none     FOLLOW-UP APPOINTMENT:  A follow-up appointment should be arranged with your doctor in [id number]; call to schedule.    You should CALL YOUR PHYSICIAN if you develop:  Fever greater than 101 degrees F.  Pain not relieved by medication, or persistent nausea or vomiting.  Excessive bleeding (blood soaking through dressing) or unexpected drainage from the wound.  Extreme redness or swelling around the incision site, drainage of pus or foul smelling drainage.  Inability to urinate or empty your bladder within 8 hours.  Problems with breathing or chest pain.    You should call 911 if you develop problems with breathing or chest pain.  If you are unable to contact your doctor or surgical center, you should go to the nearest emergency room or urgent care center.  Physician's telephone #: [phone]    If any questions arise, call your  doctor.  If your doctor is not available, please feel free to call the Surgical Center at [phone].  The Center is open Monday through Friday from 7AM to 7PM.  You can also call the [facility] HOTLINE open 24 hours/day, 7 days/week and speak to a nurse at [phone], or toll free at [phone].    A registered nurse may call you a few days after your surgery to see how you are doing after your procedure.    MEDICATIONS: Resume taking daily medication.  Take prescribed pain medication with food.  If no medication is prescribed, you may take non-aspirin pain medication if needed.  PAIN MEDICATION CAN BE VERY CONSTIPATING.  Take a stool softener or laxative such as senokot, pericolace, or milk of magnesia if needed.    Transesophageal Echocardiogram  Transesophageal echocardiography (TRAVIS) is a picture test of your heart using sound waves. The pictures taken can give very detailed pictures of your heart. This can help your doctor see if there are problems with your heart. TRAVIS can check:  · If your heart has blood clots in it.  · How well your heart valves are working.  · If you have an infection on the inside of your heart.  · Some of the major arteries of your heart.  · If your heart valve is working after a repair.  · Your heart before a procedure that uses a shock to your heart to get the rhythm back to normal.  What happens before the procedure?  · Do not eat or drink for 6 hours before the procedure or as told by your doctor.  · Make plans to have someone drive you home after the procedure. Do not drive yourself home.  · An IV tube will be put in your arm.  What happens during the procedure?  · You will be given a medicine to help you relax (sedative). It will be given through the IV tube.  · A numbing medicine will be sprayed or gargled in the back of your throat to help numb it.  · The tip of the probe is placed into the back of your mouth. You will be asked to swallow. This helps to pass the probe into  your esophagus.  · Once the tip of the probe is in the right place, your doctor can take pictures of your heart.  · You may feel pressure at the back of your throat.  What happens after the procedure?  · You will be taken to a recovery area so the sedative can wear off.  · Your throat may be sore and scratchy. This will go away slowly over time.  · You will go home when you are fully awake and able to swallow liquids.  · You should have someone stay with you for the next 24 hours.  · Do not drive or operate machinery for the next 24 hours.  This information is not intended to replace advice given to you by your health care provider. Make sure you discuss any questions you have with your health care provider.  Document Released: 10/15/2010 Document Revised: 05/25/2017 Document Reviewed: 06/19/2014  [employer] Patient Education © 2017 [employer]      If your physician has prescribed pain medication that includes Acetaminophen (Tylenol), do not take additional Acetaminophen (Tylenol) while taking the prescribed medication.    Depression / Suicide Risk    As you are discharged from this [facility] facility, it is important to learn how to keep safe from harming yourself.    Recognize the warning signs:  · Abrupt changes in personality, positive or negative- including increase in energy   · Giving away possessions  · Change in eating patterns- significant weight changes-  positive or negative  · Change in sleeping patterns- unable to sleep or sleeping all the time   · Unwillingness or inability to communicate  · Depression  · Unusual sadness, discouragement and loneliness  · Talk of wanting to die  · Neglect of personal appearance   · Rebelliousness- reckless behavior  · Withdrawal from people/activities they love  · Confusion- inability to concentrate     If you or a loved one observes any of these behaviors or has concerns about self-harm, here's what you can do:  · Talk about it- your feelings and  reasons for harming yourself  · Remove any means that you might use to hurt yourself (examples: pills, rope, extension cords, firearm)  · Get professional help from the community (Mental Health, Substance Abuse, psychological counseling)  · Do not be alone:Call your Safe Contact- someone whom you trust who will be there for you.  · Call your local CRISIS HOTLINE 180-0102 or 802-589-8412  · Call your local Children's Mobile Crisis Response Team [place] (865) 090-0050 or www.[place]  · Call the toll free National Suicide Prevention Hotlines   · National Suicide Prevention Lifeline 278-717-DKXW (9852)  · National Hope Line Network 800-SUICIDE (176-7497)

## 2019-02-15 ENCOUNTER — TELEPHONE (OUTPATIENT)
Dept: CARDIOLOGY | Facility: MEDICAL CENTER | Age: 79
End: 2019-02-15

## 2019-02-15 NOTE — TELEPHONE ENCOUNTER
Procedure   Received: Today   Message Contents   Sole Choudhury, Dipesh Ass't  Marina Washburn RSchuylerN.   Phone Number: [phone]             [facility] - Marina     Pt calling in regards to procedure from 2/12.     He can be reached [phone]      Contacted patient.  Explained will need to discuss with SW as no results are available in chart.  Will follow up with patient this afternoon.     Patient had TRAVIS due to now symptomatic related to MR      To SW - please advise on recent TRAVIS result and advise on next step for patient.

## 2019-02-19 ENCOUNTER — TELEPHONE (OUTPATIENT)
Dept: CARDIOLOGY | Facility: MEDICAL CENTER | Age: 79
End: 2019-02-19

## 2019-02-20 NOTE — TELEPHONE ENCOUNTER
follow up   Received: 4 days ago   Message Contents   Rashard Conte M.D.  Marina Washburn RMING.             Please arrange a follow-up appointment so I can review his TRAVIS with him next week thank you      Contacted patient, scheduled [facility] next Tues 2/26

## 2019-02-26 ENCOUNTER — TELEPHONE (OUTPATIENT)
Dept: CARDIOLOGY | Facility: MEDICAL CENTER | Age: 79
End: 2019-02-26

## 2019-02-26 ENCOUNTER — OFFICE VISIT (OUTPATIENT)
Dept: CARDIOLOGY | Facility: MEDICAL CENTER | Age: 79
End: 2019-02-26
Payer: MEDICARE

## 2019-02-26 VITALS
DIASTOLIC BLOOD PRESSURE: 70 MMHG | HEART RATE: 72 BPM | SYSTOLIC BLOOD PRESSURE: 134 MMHG | BODY MASS INDEX: 25.03 KG/M2 | OXYGEN SATURATION: 95 % | HEIGHT: 74 IN | WEIGHT: 195 LBS

## 2019-02-26 DIAGNOSIS — Z86.79 S/P THORACIC AORTIC ANEURYSM REPAIR: ICD-10-CM

## 2019-02-26 DIAGNOSIS — I34.1 MVP (MITRAL VALVE PROLAPSE): ICD-10-CM

## 2019-02-26 DIAGNOSIS — I34.0 MITRAL VALVE INSUFFICIENCY, UNSPECIFIED ETIOLOGY: ICD-10-CM

## 2019-02-26 DIAGNOSIS — Z98.890 S/P THORACIC AORTIC ANEURYSM REPAIR: ICD-10-CM

## 2019-02-26 DIAGNOSIS — Z95.2 S/P AVR (AORTIC VALVE REPLACEMENT): ICD-10-CM

## 2019-02-26 DIAGNOSIS — R06.09 DYSPNEA ON EXERTION: ICD-10-CM

## 2019-02-26 PROCEDURE — 99214 OFFICE O/P EST MOD 30 MIN: CPT | Performed by: INTERNAL MEDICINE

## 2019-02-26 ASSESSMENT — ENCOUNTER SYMPTOMS
LOSS OF CONSCIOUSNESS: 0
PALPITATIONS: 0
ORTHOPNEA: 0
PND: 0
SHORTNESS OF BREATH: 0
MYALGIAS: 0
COUGH: 0
DIZZINESS: 0

## 2019-02-26 NOTE — TELEPHONE ENCOUNTER
Patient is scheduled on 3-12-19 for a R&L hrt cath w/poss with Dr. Conte. No meds to stop and patient to check in at 7:00 for a 9:00 procedure. H&P was done on 2-26-19 by Dr. Conte. Pre admit is scheduled on 3-11-19 at 1:15.

## 2019-02-27 NOTE — PROGRESS NOTES
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 weight loss, significant motor weakness and loss of sensations.  Patient denies any suicidal or homicidal ideations    Pain Medications:  Current:  Naproxen with  some relief  Hydrocodone/acetaminophen 10/325 one to 2 tablets per day as needed- Dr. Bertha Gonzalez, Risperdal, amitriptyline    Tried in Past:  NSAIDs - Naproxen  Opioids- Tylenol with codeine and [place]  SNRI -Never    Physical Therapy/Home Exercise: yes in the past     report: Reviewed and consistent with medication use as prescribed.    Interventional Pain History  7/26/2017- left L3,4,5 RFA  7/12/2017- right L3,4,5 RFA  3/8/2017- right C3,4,5 RFA   2/22/2017- left C3,4,5 RFA  12/21/16 C7-T1 [place] HOWARD- 50% relief for 2 weeks  6/22/2016 Right-sided piriformis muscle injection and right initial bursa injection  12/6/15- Right SI joint ablation with limited relief  11/18/15: Right radiofrequency ablation of the the medial branch nerves at the transverse process of L4, L5 and sacral ala  1/29/15- right hip joint injection. Trigger point injections under xray  5/13/15- Left radiofrequency ablation of the the medial branch nerves at the   transverse process of L4, L5 and sacral ala   4/22/15- Right radiofrequency ablation of the the medial branch nerves at the   transverse process of L4, L5 and sacral ala   1/29/15- right hip joint inj  1/14/15- right SI joint inj  10/1/14- left L4-sacral ala RFA  9/24/14- right RFA L4-sacral ala  9/3/14- right L4-sacral ala MBB  8/27/14- bilateral L4-sacral ala MBB  7/10/14- right sI joint inj (IR)  8/15/13- right SI joint inj (Ir)    Chiropractor-never  Acupuncture-never  TENS unit-never  Spinal decompression- H/O C5-6 ACDF  Joint replacement-never    Imaging:   X-ray lumbar spine 7/21/2016  No significant change from previous x-ray in 2014 based on my review, I do not have the radiologist report from this current x-ray.    DEXA scan 3/15/2016  Impression: Mild osteopenia progressed since the previous study with a T score of the femoral neck of -1.4 of the lumbar spine of -1.4  MRI lumbar spine 8/14/2014  Findings:    Lumbar spine alignment is within normal limits. Vertebral body  heights are well-maintained. There is mild disk space desiccation and narrowing worst at L5-S1. No fractures or dislocations. Bone marrow signal is within normal limits without findings   to suggest an infiltrative process.    Visualized spinal cord is normal in signal and contour. The conus medullaris terminates at the T12-L1 level. The cauda equina is unremarkable.    L1-L2: No spinal canal stenosis or neural foraminal narrowing.    L2-L3: There is a small circumferential disk bulge with a small posterior annular fissure. No spinal canal stenosis or neural foraminal narrowing.    L3-L4: There is a small circumferential disk large, mild bilateral facet joint arthropathy and mild bilateral ligamentum flavum buckling. No spinal canal stenosis or neural foraminal narrowing.    L4-L5: There is a small circumferential disk bulge, mild bilateral facet arthropathy and mild bilateral ligamentum flavum buckling. No spinal canal stenosis or neural foraminal narrowing.    L5-S1: There is a small circumferential disk large and mild bilateral facet joint arthropathy. These findings result in mild right-sided neural foraminal narrowing. No spinal canal stenosis.    Visualized retroperitoneal structures are unremarkable.       Result Impression         Mild multilevel spondylosis contributing to mild right-sided neural foraminal narrowing at L5-S1. No significant spinal canal stenosis.         Relevant imaging:  Result Narrative     Comparison: None.    Findings: AP and frog lateral radiographs of the hips and pelvis shows normal osseous structures soft tissues and joint spaces.       Result Impression     Normal exam      Electronically signed by: DESTINEE ALMAZAN MD  Date: 02/12/15  Time: 12:35    Technique: Routine number spine MRI without contrast.    Comparison: None.    Findings:    Lumbar spine alignment is within normal limits. Vertebral body heights are well-maintained. There is mild disk space desiccation and narrowing  worst at L5-S1. No fractures or dislocations. Bone marrow signal is within normal limits without findings   to suggest an infiltrative process.    Visualized spinal cord is normal in signal and contour. The conus medullaris terminates at the T12-L1 level. The cauda equina is unremarkable.    L1-L2: No spinal canal stenosis or neural foraminal narrowing.    L2-L3: There is a small circumferential disk bulge with a small posterior annular fissure. No spinal canal stenosis or neural foraminal narrowing.    L3-L4: There is a small circumferential disk large, mild bilateral facet joint arthropathy and mild bilateral ligamentum flavum buckling. No spinal canal stenosis or neural foraminal narrowing.    L4-L5: There is a small circumferential disk bulge, mild bilateral facet arthropathy and mild bilateral ligamentum flavum buckling. No spinal canal stenosis or neural foraminal narrowing.    L5-S1: There is a small circumferential disk large and mild bilateral facet joint arthropathy. These findings result in mild right-sided neural foraminal narrowing. No spinal canal stenosis.    Visualized retroperitoneal structures are unremarkable.       Result Impression         Mild multilevel spondylosis contributing to mild right-sided neural foraminal narrowing at L5-S1. No significant spinal canal stenosis.  ______________________________________     Electronically signed by resident: Gus Epperson MD  Date: 08/14/14  Time: 13:12     Cervical MRI 11/29/16  Narrative   Procedure: MRI of the cervical spine with and without contrast    Technique: sagittal T1, T2, STIR and axial T2 and gradient images of the cervical spine without contrast.  Additional axial T1 and sagittal fat sat T1 post contrast imaging. 6 mL of gadavist contrast was injected intravenously.             Comparison: Plain film 12/03/2015    Findings: Remote operative change from C5/C6 anterior cervical spinal fusion. Allowing for artifact from metallic hardware in  the cervical sagittal alignment is relatively stable with continued straightening of the normal cervical lordosis and grade 1 retrolisthesis of C5 on C6.. The cervical vertebral body heights and contours are relatively stable without evidence for acute fracture or subluxation. The craniocervical junction within normal limits. The cerebellar tonsils are appropriately located.        The cervical spinal cord is normal in signal and contour allowing for slight motion limitation.  No abnormal intrathecal enhancement.      C2/C3: Bulging disc with partial effacement ventral thecal sac with mild central canal stenosis without significant neural foraminal stenosis.    C3/C4: Bulging disc without significant central canal or neuroforaminal stenosis.    C4/C5: Bulging disc with uncovertebral joint hypertrophy facet joint arthropathy without significant central canal stenosis with mild/moderate bilateral foraminal stenosis.     C5/C6: Bulging disc and uncovertebral joint hypertrophy and facet joint arthropathy with mild/moderate central canal stenosis with moderate bilateral foraminal stenosis    C6/C7: Limitation of this level secondary to operative change. No definite recurrent disc herniation or significant central canal stenosis.    C7/T1: Bulging disc without significant central canal stenosis. Additional limitation of this level from postoperative metal.   Impression    Remote operative change from C5/C6 anterior spinal fusion. Allowing for limitation by metallic hardware there is multilevel degenerative change most prominent above the spinal fusion at the C5/C6 level with bulging disc, uncovertebral joint hypertrophy facet joint arthropathy with superimposed grade 1 retrolisthesis resulting in mild/moderate resulting central canal stenosis and moderate bilateral neuroforaminal stenosis.    No cord signal abnormality to suggest edema, no abnormal intrathecal enhancement       REVIEW OF SYSTEMS:    GENERAL:  No weight  "loss, malaise or fevers.  HEENT:   No recent changes in vision or hearing  NECK:  Negative for lumps, no difficulty with swallowing.  RESPIRATORY:  Negative for cough, wheezing or shortness of breath, patient denies any recent URI.  CARDIOVASCULAR:  Negative for chest pain, leg swelling or palpitations.  GI:  Negative for abdominal discomfort.  H/O UC and GERD.  MUSCULOSKELETAL:  See HPI.  SKIN:  Negative for lesions, rash, and itching.  PSYCH:  No mood disorder or recent psychosocial stressors.  Patients sleep is not disturbed secondary to pain.  HEMATOLOGY/LYMPHOLOGY:  Negative for prolonged bleeding, bruising easily or swollen nodes.  Patient is not currently taking any anti-coagulants  NEURO:   No history of headaches, syncope, paralysis, seizures or tremors.  All other reviewed and negative other than HPI.          Objective:      /88   Pulse 68   Temp 98.6 °F (37 °C) (Oral)   Ht 5' 4" (1.626 m)   Wt 55.8 kg (123 lb)   LMP 07/11/2013   BMI 21.11 kg/m²     PHYSICAL EXAMINATION:    GENERAL: Well appearing, in no acute distress, alert and oriented x3.  PSYCH:  Mood and affect appropriate.  SKIN: Skin color, texture, turgor normal, no rashes or lesions.  HEAD/FACE:  Normocephalic, atraumatic. Cranial nerves grossly intact.  NECK: Pain to palpation over the cervical paraspinous and trapezius muscles.  Spurling Negative bilaterally.  Mild pain with neck extension. Positive facet loading bilaterally, L>R.  CV: RRR with palpation of the radial artery.  PULM: No evidence of respiratory difficulty, symmetric chest rise.  BACK: There is no pain with palpation over lumbar spine. Pain with palpation to subscapular area. Full ROM with mild pain on extension. Positive facet loading bilaterally. There is pain with palpation over bilateral SI joints.   EXTREMITIES: Peripheral joint ROM is full and pain free without obvious instability or laxity in all four extremities. No deformities, edema, or skin discoloration. " Good capillary refill.  MUSCULOSKELETAL: Shoulder provocative maneuvers are negative.   Bilateral upper  extremity strength is normal and symmetric.  No atrophy or tone abnormalities are noted.  NEURO: Bilateral upper extremity coordination and muscle stretch reflexes are physiologic and symmetric.  Abraham's negative. No clonus.  Decreased sensation to light touch over the medial aspect of the left arm.  GAIT: Antalgic- ambulating without assistance.        Assessment:       1. Myalgia     2. Facet arthritis of lumbar region     3. DDD (degenerative disc disease), lumbar     4. Sacroiliac joint pain     5. Chronic pain disorder            Plan:       - Previous imaging was reviewed and discussed with the patient today.      - Schedule for trigger point injections. The procedure, risks, benefits and options were discussed with patient. There are no contraindications to the procedure. The patient expressed understanding and agreed to proceed.  Consent obtained today.    - She is s/p lumbar RFA with significant relief. We can repeat this in the future.     - She is s/p cervical RFA with significant relief. We can repeat this should her pain returns.     - She has also had relief with cervical HOWARD in the past. We can repeat this in the future.     - The patient will continue a home exercise routine to help with pain and strengthening.      - RTC 2 weeks after above procedure.     - Dr. Zafar was consulted on the patient and agrees with this plan.    The above plan and management options were discussed at length with patient. Patient is in agreement with the above and verbalized understanding.     Sharee Serrano NP  08/14/2017

## 2019-03-11 DIAGNOSIS — Z01.812 PRE-OPERATIVE LABORATORY EXAMINATION: ICD-10-CM

## 2019-03-11 DIAGNOSIS — Z01.810 PRE-OPERATIVE CARDIOVASCULAR EXAMINATION: ICD-10-CM

## 2019-03-11 LAB
ALBUMIN SERPL BCP-MCNC: 3.9 G/DL (ref 3.2–4.9)
ALBUMIN/GLOB SERPL: 1.3 G/DL
ALP SERPL-CCNC: 83 U/L (ref 30–99)
ALT SERPL-CCNC: 17 U/L (ref 2–50)
ANION GAP SERPL CALC-SCNC: 6 MMOL/L (ref 0–11.9)
APTT PPP: 26.2 SEC (ref 24.7–36)
AST SERPL-CCNC: 18 U/L (ref 12–45)
BILIRUB SERPL-MCNC: 0.7 MG/DL (ref 0.1–1.5)
BUN SERPL-MCNC: 23 MG/DL (ref 8–22)
CALCIUM SERPL-MCNC: 9.5 MG/DL (ref 8.5–10.5)
CHLORIDE SERPL-SCNC: 108 MMOL/L (ref 96–112)
CO2 SERPL-SCNC: 25 MMOL/L (ref 20–33)
CREAT SERPL-MCNC: 1.24 MG/DL (ref 0.5–1.4)
EKG IMPRESSION: NORMAL
ERYTHROCYTE [DISTWIDTH] IN BLOOD BY AUTOMATED COUNT: 46.7 FL (ref 35.9–50)
GLOBULIN SER CALC-MCNC: 3.1 G/DL (ref 1.9–3.5)
GLUCOSE SERPL-MCNC: 101 MG/DL (ref 65–99)
HCT VFR BLD AUTO: 43.2 % (ref 42–52)
HGB BLD-MCNC: 13.9 G/DL (ref 14–18)
INR PPP: 1.04 (ref 0.87–1.13)
MCH RBC QN AUTO: 29.8 PG (ref 27–33)
MCHC RBC AUTO-ENTMCNC: 32.2 G/DL (ref 33.7–35.3)
MCV RBC AUTO: 92.7 FL (ref 81.4–97.8)
PLATELET # BLD AUTO: 112 K/UL (ref 164–446)
PMV BLD AUTO: 12.4 FL (ref 9–12.9)
POTASSIUM SERPL-SCNC: 4.3 MMOL/L (ref 3.6–5.5)
PROT SERPL-MCNC: 7 G/DL (ref 6–8.2)
PROTHROMBIN TIME: 13.8 SEC (ref 12–14.6)
RBC # BLD AUTO: 4.66 M/UL (ref 4.7–6.1)
SODIUM SERPL-SCNC: 139 MMOL/L (ref 135–145)
WBC # BLD AUTO: 5.3 K/UL (ref 4.8–10.8)

## 2019-03-11 PROCEDURE — 80053 COMPREHEN METABOLIC PANEL: CPT

## 2019-03-11 PROCEDURE — 93005 ELECTROCARDIOGRAM TRACING: CPT

## 2019-03-11 PROCEDURE — 85730 THROMBOPLASTIN TIME PARTIAL: CPT

## 2019-03-11 PROCEDURE — 85610 PROTHROMBIN TIME: CPT

## 2019-03-11 PROCEDURE — 36415 COLL VENOUS BLD VENIPUNCTURE: CPT

## 2019-03-11 PROCEDURE — 85027 COMPLETE CBC AUTOMATED: CPT

## 2019-03-11 PROCEDURE — 93010 ELECTROCARDIOGRAM REPORT: CPT | Performed by: INTERNAL MEDICINE

## 2019-03-12 ENCOUNTER — HOSPITAL ENCOUNTER (OUTPATIENT)
Facility: MEDICAL CENTER | Age: 79
End: 2019-03-12
Attending: INTERNAL MEDICINE | Admitting: INTERNAL MEDICINE
Payer: MEDICARE

## 2019-03-12 VITALS
WEIGHT: 193.12 LBS | RESPIRATION RATE: 16 BRPM | TEMPERATURE: 98.1 F | HEART RATE: 66 BPM | SYSTOLIC BLOOD PRESSURE: 142 MMHG | OXYGEN SATURATION: 96 % | HEIGHT: 74 IN | DIASTOLIC BLOOD PRESSURE: 67 MMHG | BODY MASS INDEX: 24.78 KG/M2

## 2019-03-12 PROCEDURE — C1894 INTRO/SHEATH, NON-LASER: HCPCS

## 2019-03-12 PROCEDURE — 307093 HCHG TR BAND RADIAL

## 2019-03-12 PROCEDURE — 700117 HCHG RX CONTRAST REV CODE 255: Performed by: INTERNAL MEDICINE

## 2019-03-12 PROCEDURE — 160002 HCHG RECOVERY MINUTES (STAT)

## 2019-03-12 PROCEDURE — 99152 MOD SED SAME PHYS/QHP 5/>YRS: CPT

## 2019-03-12 PROCEDURE — C1769 GUIDE WIRE: HCPCS

## 2019-03-12 PROCEDURE — 361014 HCHG 6FR ARROW SWAN/THERMO

## 2019-03-12 PROCEDURE — 93460 R&L HRT ART/VENTRICLE ANGIO: CPT | Mod: 26 | Performed by: INTERNAL MEDICINE

## 2019-03-12 PROCEDURE — 700101 HCHG RX REV CODE 250

## 2019-03-12 PROCEDURE — 99153 MOD SED SAME PHYS/QHP EA: CPT

## 2019-03-12 PROCEDURE — 99152 MOD SED SAME PHYS/QHP 5/>YRS: CPT | Performed by: INTERNAL MEDICINE

## 2019-03-12 PROCEDURE — 700111 HCHG RX REV CODE 636 W/ 250 OVERRIDE (IP)

## 2019-03-12 PROCEDURE — 360979 HCHG DIAGNOSTIC CATH

## 2019-03-12 PROCEDURE — 93460 R&L HRT ART/VENTRICLE ANGIO: CPT

## 2019-03-12 RX ORDER — CLONIDINE HYDROCHLORIDE 0.1 MG/1
0.1 TABLET ORAL 3 TIMES DAILY PRN
Status: DISCONTINUED | OUTPATIENT
Start: 2019-03-12 | End: 2019-03-12 | Stop reason: HOSPADM

## 2019-03-12 RX ORDER — VERAPAMIL HYDROCHLORIDE 2.5 MG/ML
INJECTION, SOLUTION INTRAVENOUS
Status: COMPLETED
Start: 2019-03-12 | End: 2019-03-12

## 2019-03-12 RX ORDER — SODIUM CHLORIDE 9 MG/ML
INJECTION, SOLUTION INTRAVENOUS CONTINUOUS
Status: DISCONTINUED | OUTPATIENT
Start: 2019-03-12 | End: 2019-03-12

## 2019-03-12 RX ORDER — MIDAZOLAM HYDROCHLORIDE 1 MG/ML
INJECTION INTRAMUSCULAR; INTRAVENOUS
Status: COMPLETED
Start: 2019-03-12 | End: 2019-03-12

## 2019-03-12 RX ORDER — SODIUM CHLORIDE 9 MG/ML
INJECTION, SOLUTION INTRAVENOUS CONTINUOUS
Status: DISCONTINUED | OUTPATIENT
Start: 2019-03-12 | End: 2019-03-12 | Stop reason: HOSPADM

## 2019-03-12 RX ORDER — LIDOCAINE HYDROCHLORIDE 20 MG/ML
INJECTION, SOLUTION INFILTRATION; PERINEURAL
Status: COMPLETED
Start: 2019-03-12 | End: 2019-03-12

## 2019-03-12 RX ORDER — HEPARIN SODIUM,PORCINE 1000/ML
VIAL (ML) INJECTION
Status: COMPLETED
Start: 2019-03-12 | End: 2019-03-12

## 2019-03-12 RX ADMIN — HEPARIN SODIUM: 1000 INJECTION, SOLUTION INTRAVENOUS; SUBCUTANEOUS at 08:44

## 2019-03-12 RX ADMIN — MIDAZOLAM HYDROCHLORIDE 1 MG: 1 INJECTION, SOLUTION INTRAMUSCULAR; INTRAVENOUS at 09:47

## 2019-03-12 RX ADMIN — FENTANYL CITRATE 100 MCG: 50 INJECTION INTRAMUSCULAR; INTRAVENOUS at 09:14

## 2019-03-12 RX ADMIN — VERAPAMIL HYDROCHLORIDE 2.5 MG: 2.5 INJECTION, SOLUTION INTRAVENOUS at 08:44

## 2019-03-12 RX ADMIN — HEPARIN SODIUM: 200 INJECTION, SOLUTION INTRAVENOUS at 08:45

## 2019-03-12 RX ADMIN — SODIUM CHLORIDE: 9 INJECTION, SOLUTION INTRAVENOUS at 07:53

## 2019-03-12 RX ADMIN — MIDAZOLAM HYDROCHLORIDE 2 MG: 1 INJECTION, SOLUTION INTRAMUSCULAR; INTRAVENOUS at 09:14

## 2019-03-12 RX ADMIN — LIDOCAINE HYDROCHLORIDE: 20 INJECTION, SOLUTION INFILTRATION; PERINEURAL at 08:44

## 2019-03-12 RX ADMIN — NITROGLYCERIN 10 ML: 20 INJECTION INTRAVENOUS at 08:44

## 2019-03-12 RX ADMIN — IOHEXOL 72 ML: 350 INJECTION, SOLUTION INTRAVENOUS at 09:47

## 2019-03-12 NOTE — OR NURSING
1000 Pt report given from cath lab RN, pt transferred over on a gurney, pt placed on monitor, B/P every 15 minutes per MD order. Right radial TR band in place, 13ml of air in band per RN report. Site clean, dry and soft. Family at bedside, pt was given water and a snack. Pt instructed to limit right wrist movement.     1100 2 ml of air was removed from TR band no S/S of bleeding    1115 3 ml of air was removed from TR band no S/S of bleeding    1130 3 ml of air was removed from TR band no S/S of bleeding    1145 3 ml of air was removed from TR band no S/S of bleeding    1200 3 ml of air was removed from TR band no S/S of bleeding    1215 TR band was removed and dressing placed. No S/S of bleeding.     1230 pt given D/C instructions, pt verbalized understanding. Pt was given information post angiogram care. Pt going home with family, pt refused wheelchair.

## 2019-03-12 NOTE — DISCHARGE INSTRUCTIONS
ACTIVITY: Rest and take it easy for the first 24 hours.  A responsible adult is recommended to remain with you during that time.  It is normal to feel sleepy.  We encourage you to not do anything that requires balance, judgment or coordination.    MILD FLU-LIKE SYMPTOMS ARE NORMAL. YOU MAY EXPERIENCE GENERALIZED MUSCLE ACHES, THROAT IRRITATION, HEADACHE AND/OR SOME NAUSEA.    FOR 24 HOURS DO NOT:  Drive, operate machinery or run household appliances.  Drink beer or alcoholic beverages.   Make important decisions or sign legal documents.    SPECIAL INSTRUCTIONS: keep incision dry for 24 hours    DIET: To avoid nausea, slowly advance diet as tolerated, avoiding spicy or greasy foods for the first day.  Add more substantial food to your diet according to your physician's instructions.  Babies can be fed formula or breast milk as soon as they are hungry.  INCREASE FLUIDS AND FIBER TO AVOID CONSTIPATION.    SURGICAL DRESSING/BATHING: do not shower for 24 hours, may shower tomorrow 3/13/2019 after 10am    FOLLOW-UP APPOINTMENT:  A follow-up appointment should be arranged with your cardiologist at [phone]; call to schedule.    You should CALL YOUR PHYSICIAN if you develop:  Fever greater than 101 degrees F.  Pain not relieved by medication, or persistent nausea or vomiting.  Excessive bleeding (blood soaking through dressing) or unexpected drainage from the wound.  Extreme redness or swelling around the incision site, drainage of pus or foul smelling drainage.  Inability to urinate or empty your bladder within 8 hours.  Problems with breathing or chest pain.    You should call 911 if you develop problems with breathing or chest pain.  If you are unable to contact your doctor or surgical center, you should go to the nearest emergency room or urgent care center.  Physician's telephone #: [phone]    If any questions arise, call your doctor.  If your doctor is not available, please feel free to call the Surgical  Center at [phone].  The Center is open Monday through Friday from 7AM to 7PM.  You can also call the HEALTH HOTLINE open 24 hours/day, 7 days/week and speak to a nurse at [phone], or toll free at [phone].    A registered nurse may call you a few days after your surgery to see how you are doing after your procedure.    MEDICATIONS: Resume taking daily medication.  Take prescribed pain medication with food.  If no medication is prescribed, you may take non-aspirin pain medication if needed.  PAIN MEDICATION CAN BE VERY CONSTIPATING.  Take a stool softener or laxative such as senokot, pericolace, or milk of magnesia if needed.    If your physician has prescribed pain medication that includes Acetaminophen (Tylenol), do not take additional Acetaminophen (Tylenol) while taking the prescribed medication.    Depression / Suicide Risk    As you are discharged from this [facility] facility, it is important to learn how to keep safe from harming yourself.    Recognize the warning signs:  · Abrupt changes in personality, positive or negative- including increase in energy   · Giving away possessions  · Change in eating patterns- significant weight changes-  positive or negative  · Change in sleeping patterns- unable to sleep or sleeping all the time   · Unwillingness or inability to communicate  · Depression  · Unusual sadness, discouragement and loneliness  · Talk of wanting to die  · Neglect of personal appearance   · Rebelliousness- reckless behavior  · Withdrawal from people/activities they love  · Confusion- inability to concentrate     If you or a loved one observes any of these behaviors or has concerns about self-harm, here's what you can do:  · Talk about it- your feelings and reasons for harming yourself  · Remove any means that you might use to hurt yourself (examples: pills, rope, extension cords, firearm)  · Get professional help from the community (Mental Health, Substance Abuse,  psychological counseling)  · Do not be alone:Call your Safe Contact- someone whom you trust who will be there for you.  · Call your local CRISIS HOTLINE 257-7349 or 366-061-4352  · Call your local Children's Mobile Crisis Response Team [place] (541) 197-4969 or www.[employer]  · Call the toll free National Suicide Prevention Hotlines   · National Suicide Prevention Lifeline 514-274-UUVK (7580)  · [place] [employer] Line Network 800-SUICIDE (734-8991)    Radial Catherization Discharge Instructions      · Do not subject hand/arm to any forceful movements for 24 hours    i.e. supporting weight when rising from the chair or bed.   · Do not drive a car for 24 hours  · You may remove the dressing tomorrow  · You may shower on the day following your procedure.  Do not take a tub bath or submerge the puncture site in water for 3 days following the procedure.  · No Lifting more than 3-5 pounds with affected wrist for 5 days  · Follow up with cardiology in 2-4 weeks.  · Increase fluids for 2 days post procedure.  · Continue all previous medications unless otherwise instructed.    If bleeding should occur following discharge:  · Sit down and apply firm pressure to site with your fingers for 10 minutes  · If the bleeding stops, continue to sit quietly, keeping your wrist straight for 2 hours.  Notify physician as soon as possible ( [phone])  · If bleeding does not stop after 10 minutes, or if there is a large amount of bleeding or spurting, call 911 immediately.  Do not drive yourself to the hospital.

## 2019-03-12 NOTE — PROCEDURES
DATE OF SERVICE:  03/12/2019    PROCEDURE:  Cardiac catheterization.  A.  Left heart catheterization.  B.  Left ventriculography.  C.  Selective coronary angiography.  D.  Right heart catheterization.  E.  Right radial artery approach.  F.  Right internal jugular vein approach.  G.  Conscious sedation supervision.    PREPROCEDURE DIAGNOSES:  1.  Myxomatous mitral valve disease.  2.  Severe mitral regurgitation.  3.  Aortic valve replacement homograft, 2000 for critical aortic stenosis.  4.  Redo aortic valve replacement, 23 mm composite pericardial valve and   ascending aortic aneurysm repair with 28 mm Valsalva sleeve graft on   02/21/2013.  5.  Biventricular pacemaker implantation on 12/29/2014 for cardiomyopathy and   left bundle-branch block.    POSTPROCEDURE DIAGNOSES:  1.  Severe mitral regurgitation.  2.  Left ventricular ejection fraction 61% with normal wall motion.  3.  Coronary arteries are angiographically normal.  4.  Normal pulmonary pressures.  5.  Elevated bioprosthetic aortic valve pullback gradient.  6.  Elevated LVEDP.    PHYSICIAN:  Rashard Conte MD    COMPLICATIONS:  None.    MEDICATIONS:  1.  Versed 3 mg IV.  2.  Fentanyl 100 mcg IV.  3.  Lidocaine 2% subcutaneous.  4.  Heparin 3000 units IA.  5.  Nitroglycerin 100 mcg IA.  6.  Verapamil 2.5 mg IA.    INDICATIONS:  The patient is an 79-year-old male with a history of homograft   aortic valve replacement for critical aortic stenosis in 2000 at [facility] in [place], [place], and subsequent redo aortic valve   replacement and ascending aortic aneurysm repair with 23 mm composite   pericardial tissue valve and 28 mm Valsalva sleeve graft on 02/21/2013 at   [facility] in [place], [place], for degenerative homograft   aortic valve replacement with severe aortic regurgitation, chronic left   bundle-branch block with subsequent biventricular pacemaker placement for   cardiomyopathy on 12/29/2014 with  normalization of left ventricular ejection   fraction, hypertension and chronic ventricular ectopy, who has developed   exertional shortness of breath and subsequently underwent a transthoracic   echocardiogram on 12/17/2018, which showed left ventricular ejection fraction   of 65%, mild concentric left ventricular hypertrophy, normal functioning   bioprosthetic valve with peak gradient 19 mmHg, myxomatous mitral valve disease   with prolapse and severe mitral regurgitation and a transesophageal echocardiogram   on 02/12/2019, which showed left ventricular ejection fraction of 70%, myxomatous   mitral valve disease involving both leaflets with prolapse of both leaflets and severe   mitral regurgitation.  The patient is undergoing a diagnostic cardiac   catheterization prior to consideration of mitral valve repair.    DESCRIPTION OF PROCEDURE:  After informed consent was obtained, the patient   was brought to the cardiac catheterization laboratory where he was prepped,   draped, and anesthetized in the usual manner.    After having established adequate collateral circulation to the right hand   with a pressure and oximetry-guided Leroy's test, the volar surface of the   right wrist was prepped, draped, and anesthetized in usual manner.    Using ultrasound guidance and modified Seldinger technique, 6-Burkinan x 10 cm   introducer sheath was inserted in the right internal jugular vein.  Next,   using ultrasound guidance and modified Seldinger technique, a 6-Burkinan x 10 cm   introducer sheath was inserted in the right radial artery.  Heparin,   verapamil, and nitroglycerin were given via the side port.    Next, a 6-Burkinan balloon tip Morrison-Lucille catheter was advanced into the   pulmonary artery in pulmonary capillary wedge position.  Right heart pressures   and thermodilution cardiac outputs were obtained.  Morrison-Lucille catheter was   removed.  Next, a 4-Burkinan JL 3.5 left coronary catheter was inserted in the   ostium of the  left coronary artery and left coronary angiograms were obtained   in various projections.    Next, a 4-Vatican citizen JR 4.0 right coronary catheter was inserted in the ostium of   the right coronary artery and right coronary angiograms were obtained in   various projections.    Next, using a 0.035 fixed core wire, the aortic valve was crossed and a JR4   catheter was advanced to left ventricle.  Using exchange length wire, the JR4   was exchanged for a 4-Vatican citizen pigtail catheter and a single plane CAREY left   ventricular angiogram was performed.  Pre and post angiogram LVEDP, LV, and   aortic pressures were obtained.    At the end of the procedure, catheters were removed.  Hemostasis was achieved   with a wrist band device at the right radial artery site and manual   compression of the right internal jugular site.  The patient tolerated the   procedure well.    CONSCIOUS SEDATION: I supervised and monitored the administration of   intravenous conscious sedation from 8:59 am until 9:40 am.    FINDINGS:  HEMODYNAMICS:  RIGHT HEART PRESSURES:  1.  Right atrial pressure mean 6 mmHg.  2.  Right ventricular pressure 30/7.  3.  Pulmonary artery pressure 28/12, mean of 18 mmHg.  4.  Pulmonary capillary wedge pressure 12 mmHg.    Cardiac output thermodilution method 5.6 liters per minute, cardiac index 2.6   liters per minute per meter squared.    Pulmonary vascular resistance 1.1 Wood units.    LEFT HEART PRESSURES:  1.  LVEDP 19 mmHg.  2.  Left ventricular systolic pressure 150 mmHg.  3.  Central aortic pressure systolic 122, diastolic 61, mean of 84 mmHg.  4.  Pullback pressure 28 mmHg.    LEFT VENTRICULOGRAPHY:  Left ventricular chamber size, wall motion, and   systolic function are normal.  Calculated ejection fraction 61%.  There is   severe mitral regurgitation.    CORONARY ARTERIOGRAPHY:  1.  LEFT MAIN ARTERY:  Left main is a large-caliber vessel, angiographically   normal, trifurcates into left anterior descending artery,  ramus intermedius   vessel, and circumflex artery.  2.  LEFT ANTERIOR DESCENDING ARTERY:  The left anterior descending artery is a   large-caliber vessel, gives rise to 2 major diagonal branches, normal   complement of septal branches, and extends around the apex.  The left anterior   descending artery is angiographically normal.  3.  RAMUS INTERMEDIUS:  The ramus is a thin long-caliber vessel,   angiographically normal.  4.  CIRCUMFLEX ARTERY:  The circumflex gives rise to 3 small caliber marginal   branches and is angiographically normal.  5.  RIGHT CORONARY ARTERY:  Right coronary artery is a moderately   large-caliber vessel, gives rise to a large posterior descending artery and a   small posterolateral system.  Angiographically, the right coronary artery is   normal.    PLAN:   1. Refer for consideration of mitral valve repair.  2. Review echocardiograms regarding bioprosthetic aortic valve function.       ____________________________________     MD LÁZARO COELLO / ARGELIA    DD:  03/12/2019 13:58:41  DT:  03/12/2019 14:22:36    D#:  [id number]  Job#:  [id number]

## 2019-03-13 ENCOUNTER — TELEPHONE (OUTPATIENT)
Dept: CARDIOLOGY | Facility: MEDICAL CENTER | Age: 79
End: 2019-03-13

## 2019-03-13 NOTE — TELEPHONE ENCOUNTER
"hard lump at entry site   Received: Today   Message Contents   Pari Washburn R.N.   Phone Number: [phone]             LEONIDES/[email or web address]     Pt calling to report there is a hard lump the size of a walnut at procedure entry site on neck.     Please call Ross richardson, [phone]      Contacted patient.  He describes Right IJ site the \"size of a walnut\" by \"felling it\".  He has not taken off bandage yet.  He was advised to keep bandage on for 24 hours.  He states no redness on surrounding area of bandage, no pain. He states it feels firm and hard.      Advised patient to keep bandage on for remaining 24 hours.      No ADD today.  Discussed with LS. Advised to monitor and use warm compress. Advised patient to monitor and call clinic if it does not improve or if it gets worse to make appt with a APRN for assessment or seek urgent care evaluation. Patient verbalizes all understanding.         "

## 2019-03-18 ENCOUNTER — TELEPHONE (OUTPATIENT)
Dept: CARDIOLOGY | Facility: MEDICAL CENTER | Age: 79
End: 2019-03-18

## 2019-03-18 DIAGNOSIS — I34.0 MITRAL VALVE INSUFFICIENCY, UNSPECIFIED ETIOLOGY: ICD-10-CM

## 2019-03-18 DIAGNOSIS — I34.1 MVP (MITRAL VALVE PROLAPSE): ICD-10-CM

## 2019-03-18 NOTE — TELEPHONE ENCOUNTER
V.O. received by LEONIDES for referral to valve clinic Dx: MR for clipping     Referral initiated    Task carried to referral dept/scheduling

## 2019-03-19 ENCOUNTER — TELEPHONE (OUTPATIENT)
Dept: CARDIOLOGY | Facility: MEDICAL CENTER | Age: 79
End: 2019-03-19

## 2019-03-19 NOTE — TELEPHONE ENCOUNTER
[facility] mitral review 3/19/19: Bi-leaflet prolapse. Significant prolapse at A3/P3. Posteriorly directed eccentric jet. Leaflet length adequate to grasp. Valve leaflets appear clipable. Recommend XTr clip. Multiple clips likely required.

## 2019-03-21 ENCOUNTER — TELEPHONE (OUTPATIENT)
Dept: CARDIOLOGY | Facility: MEDICAL CENTER | Age: 79
End: 2019-03-21

## 2019-03-21 NOTE — TELEPHONE ENCOUNTER
Spoke with patient regarding Dr. Conte's referral to valve program. Reviewed process for valve clinic and appointments regarding such. Patient states understanding and agrees that 4/10 and 4/11 will work for his schedule. Explained that all such appointments will be refected via [employer] in addition to the appointment letter that will be mailed. Patient states no further questions and very thankful for call.

## 2019-03-27 ENCOUNTER — NON-PROVIDER VISIT (OUTPATIENT)
Dept: CARDIOLOGY | Facility: MEDICAL CENTER | Age: 79
End: 2019-03-27
Payer: MEDICARE

## 2019-03-27 DIAGNOSIS — Z95.0 BIVENTRICULAR CARDIAC PACEMAKER IN SITU: ICD-10-CM

## 2019-03-27 DIAGNOSIS — I44.0 FIRST DEGREE ATRIOVENTRICULAR BLOCK: ICD-10-CM

## 2019-03-27 DIAGNOSIS — I44.7 LBBB (LEFT BUNDLE BRANCH BLOCK): ICD-10-CM

## 2019-03-27 PROCEDURE — 93281 PM DEVICE PROGR EVAL MULTI: CPT | Performed by: INTERNAL MEDICINE

## 2019-03-28 ENCOUNTER — TELEPHONE (OUTPATIENT)
Dept: CARDIOLOGY | Facility: MEDICAL CENTER | Age: 79
End: 2019-03-28

## 2019-03-28 NOTE — TELEPHONE ENCOUNTER
----- Message -----  From: Sole Choudhury, Med Ass't  Sent: 3/27/2019   9:57 AM  To: Marina Washburn R.N.                    Pt stated he has questions on his after visit summary from 2/26.  Ph#: [phone]

## 2019-03-28 NOTE — TELEPHONE ENCOUNTER
Ross was very confused as to why there is a surgery date set for him, and he has not even spoken with anyone about the possible procedures.  He stated that the last he had heard was there was a possibility he would be having surgery out of town and that he may have a mitral clip.  (On review of the 2/26 note, Dr. Conte had spoken to patient about Mitral Valve Repair and replacement options).  I explained the rationale for seeing both Dr. Ramirez and Dr. Morgan for input and understanding of the Mitral Repair possibilities, and he was satisfied with that and will proceed with the appointments.

## 2019-04-05 ENCOUNTER — TELEPHONE (OUTPATIENT)
Dept: CARDIOLOGY | Facility: MEDICAL CENTER | Age: 79
End: 2019-04-05

## 2019-04-05 NOTE — TELEPHONE ENCOUNTER
Spoke with pt and confirmed pt received [facility] Program letter. All appointments and times reviewed and confirmed with pt. Pt had no further questions.

## 2019-04-06 NOTE — PROGRESS NOTES
REFERRING PHYSICIAN: Rashard Conte MD & Neto Ramirez MD.     CONSULTING PHYSICIAN: Yehuda Morgan M.D. FACS.    CHIEF COMPLAINT: Shortness of breath.    HISTORY OF PRESENT ILLNESS: The patient is a 79 y.o. male with history of hypertension, nonischemic cardiomyopathy, Bi-V AICD, and AVR and ascending aortic aneurysm repair with 23 mm pericardial valve and 28 mm tube graft on 02/21/2013 at [facility]. He had prior surgery for AVR in 2000. Today he states he has had increasing shortness of breath for the six months. He tries to stay very active and can walk about 1 mile before having to stop and rest. He also complains of occasional dizziness with position changes. He has not had any syncopal episodes since he got his pacemaker. He denies chest pain, lower extremity edema, orthopnea, or PND.    PAST MEDICAL HISTORY:   Active Ambulatory Problems     Diagnosis Date Noted   • LBBB (left bundle branch block) 05/08/2013   • S/P AVR (aortic valve replacement) 05/08/2013   • S/P thoracic aortic aneurysm repair 05/08/2013   • Seasonal allergies 10/17/2013   • GERD (gastroesophageal reflux disease) 10/17/2013   • Insomnia 10/17/2013   • H/O nonmelanoma skin cancer 11/27/2013   • First degree atrioventricular block 12/29/2014   • Prediabetes 02/12/2015   • Biventricular cardiac pacemaker in situ 03/04/2015   • PVCs (premature ventricular contractions) 03/04/2015   • Bilateral hip pain 08/10/2015   • Essential hypertension, benign 09/15/2016   • H/O prostate cancer 03/13/2017   • MVP (mitral valve prolapse) 03/16/2017   • Mitral regurgitation 03/16/2017   • Cough 08/28/2018   • Dyspnea on exertion 01/30/2019     Resolved Ambulatory Problems     Diagnosis Date Noted   • Hypertension 05/08/2013   • H/O prostate cancer 05/08/2013   • S/P prostatectomy 05/08/2013   • SOB (shortness of breath) 08/28/2013   • CARDIOMYOPATHY 08/28/2013   • Skin lesion of face 10/17/2013   • First degree AV block 12/11/2014   • Syncope and collapse  12/11/2014   • H/O cardiac pacemaker 02/12/2015   • Diarrhea 02/12/2015   • Positional lightheadedness 03/04/2015   • Palpitations 03/04/2015   • MEDICAL HOME    • CKD (chronic kidney disease) stage 3, GFR 30-59 ml/min ([facility]) 08/10/2015   • Cardiomyopathy ([facility]) 09/01/2015     Past Medical History:   Diagnosis Date   • Arthritis    • Basal cell carcinoma of lip 11/27/2013   • Biventricular cardiac pacemaker in situ 3/4/2015   • Breath shortness    • Cancer ([facility])    • GERD (gastroesophageal reflux disease) 10/17/2013   • Heart burn    • Hypertension     • Indigestion    • Insomnia 10/17/2013   • LBBB (left bundle branch block)     • MEDICAL HOME    • Pacemaker    • Prostate cancer ([facility]) 2003   • PVCs (premature ventricular contractions) 3/4/2015   • S/P AVR (aortic valve replacement) 2/21/2013   • S/P prostatectomy 2003   • S/P thoracic aortic aneurysm repair 2/21/2013   • Seasonal allergies 10/17/2013       PAST SURGICAL HISTORY:   Past Surgical History:   Procedure Laterality Date   • RECOVERY  12/29/2014    Performed by Cath-Recovery Surgery at SURGERY SAME DAY [facility]   • AORTIC VALVE REPLACEMENT  2/21/13    Bovine tissur valve with ascending aortic repair..  Done in [place].   • [facility] CARDIAC CATH  2/2013    normal coronaries prior to AVR.   • OTHER  2013    carcinoma removed from lip   • APPENDECTOMY     • OTHER CARDIAC SURGERY  2000 and 2013    aVR. Thoracic aortic aneurysm repair.   • PROSTATECTOMY, RADIAL  2003.    prostate cancer.        ALLERGIES: No Known Allergies     CURRENT MEDICATIONS:   Current Outpatient Prescriptions:   •  metoprolol SR (TOPROL XL) 25 MG TABLET SR 24 HR, TAKE 1 TABLET BY MOUTH 1/2  HOUR AFTER DINNER AS  DIRECTED, Disp: 90 Tab, Rfl: 3  •  omeprazole (PRILOSEC) 20 MG delayed-release capsule, Take 1 Cap by mouth every day., Disp: 90 Cap, Rfl: 3  •  losartan (COZAAR) 50 MG Tab, Take 1 Tab by mouth every day., Disp: 90 Tab, Rfl: 3  •  fluticasone (FLONASE) 50 MCG/ACT nasal spray,  "Spray 2 Sprays in nose every day. (Patient taking differently: Spray 2 Sprays in nose as needed.), Disp: 1 Bottle, Rfl: 0  •  Cholecalciferol (VITAMIN D) 400 UNIT TABS, Take 400 Units by mouth every day., Disp: , Rfl:   •  DiphenhydrAMINE HCl (BENADRYL ALLERGY PO), Take 1 Each by mouth as needed. As needed for sleep , Disp: , Rfl:   •  aspirin 81 MG tablet, Take 81 mg by mouth every day., Disp: , Rfl:     FAMILY HISTORY:   Family History   Problem Relation Age of Onset   • Other Mother          at age 82. Parkinson's disease.   • Cancer Father          at age 68. Prostate cancer.   • Cancer Sister         Lung        SOCIAL HISTORY:   Social History     Social History   • Marital status:      Spouse name: N/A   • Number of children: N/A   • Years of education: N/A     Occupational History   • Not on file.     Social History Main Topics   • Smoking status: Never Smoker   • Smokeless tobacco: Never Used   • Alcohol use Yes      Comment: 1/day   • Drug use: No   • Sexual activity: Yes     Partners: Female     Other Topics Concern   • Not on file     Social History Narrative    Retired . . Moved to [place] in .       REVIEW OF SYSTEMS:  ROS  Neurologic: There is no history of strokes.  Respiratory: Positive for shortness of breath.  Cardiac: As per HPI.    All other systems were reviewed with the patient and are negative except what is mentioned in the aforementioned HPI, PMH and PSH.    PHYSICAL EXAMINATION:    Physical Exam  CONSTITUTIONAL:  /78 (BP Location: Right arm, Patient Position: Sitting)   Pulse (!) 56   Temp 36.6 °C (97.9 °F)   Ht 1.88 m (6' 2\")   Wt 89.2 kg (196 lb 10.4 oz)   SpO2 96%       General appearance: No apparent distress, normal development.  HEENT:  Anicteric sclerae, moist conjunctivae, moist mucous membranes, good dentition.  NECK:  Supple without jugular venous distention, without lymphadenopathy.  SKIN:   Normal skin turgor, no stasis " dermatitis or ulcers noted.  RESPIRATORY:  Clear to auscultation bilaterally, respirations are regular, symmetrical and unlabored.   CARDIAC:  Regular rate and rhythm, systolic murmur 2/6. No carotid bruits. Peripheral pulses palpable.   GASTROINTESTINAL:  Soft, non-distended, non-tender with bowel sounds present, no hepatosplenomegaly.  EXTREMITIES:  No clubbing, cyanosis, or changes consistent with peripheral vascular disease. No peripheral edema or varicosities.  NEUROLOGIC/ PSYCHIATRIC: Alert and oriented times three. Mood and affect normal.  MUSCULOSKELETAL:  Normal gait and station.    LABS REVIEWED:  Lab Results   Component Value Date/Time    SODIUM 139 03/11/2019 01:48 PM    POTASSIUM 4.3 03/11/2019 01:48 PM    CHLORIDE 108 03/11/2019 01:48 PM    CO2 25 03/11/2019 01:48 PM    GLUCOSE 101 (H) 03/11/2019 01:48 PM    BUN 23 (H) 03/11/2019 01:48 PM    CREATININE 1.24 03/11/2019 01:48 PM    BUNCREATRAT 13 02/01/2019 07:31 AM      Lab Results   Component Value Date/Time    PROTHROMBTM 13.8 03/11/2019 01:48 PM    INR 1.04 03/11/2019 01:48 PM      Lab Results   Component Value Date/Time    WBC 5.3 03/11/2019 01:48 PM    RBC 4.66 (L) 03/11/2019 01:48 PM    HEMOGLOBIN 13.9 (L) 03/11/2019 01:48 PM    HEMATOCRIT 43.2 03/11/2019 01:48 PM    MCV 92.7 03/11/2019 01:48 PM    MCH 29.8 03/11/2019 01:48 PM    MCHC 32.2 (L) 03/11/2019 01:48 PM    MPV 12.4 03/11/2019 01:48 PM    NEUTSPOLYS 38 09/01/2017 07:36 AM    LYMPHOCYTES 48 09/01/2017 07:36 AM    MONOCYTES 10 09/01/2017 07:36 AM    EOSINOPHILS 4 09/01/2017 07:36 AM    BASOPHILS 0 09/01/2017 07:36 AM        IMAGING REVIEWED AND INTERPRETED:    ECHOCARDIOGRAM: [facility] 2/15/2019  The left ventricle was normal in size and function.  Left ventricular ejection fraction is visually estimated to be 70%.  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  Severe mitral regurgitation.  Known bioprosthetic aortic valve that is functioning  normally.    ANGIOGRAM: [facility] 3/12/2019  1.  Severe mitral regurgitation.  2.  Left ventricular ejection fraction 61% with normal wall motion.  3.  Coronary arteries are angiographically normal.  4.  Normal pulmonary pressures.  5.  Elevated bioprosthetic aortic valve pullback gradient.  6.  Elevated LVEDP.    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      PLAN:  I do not recommend surgical mitral valve repair or replacement due to excessive operative risk.  I estimate this to be greater than 10%. The STS mortality risk score for mitral valve replacement is 8.2% and the morbidity and mortality risk score is 26%. The scores were discussed with patient.    The procedure, its risks, benefits, potential complications and alternative treatments were discussed with the patient in detail.  Due to the high risk of surgical intervention, he is a better candidate for transcatheter mitral valve repair with a MitraClip and I would recommend proceeding with workup.    Findings and recommendations have been discussed with the patient’s cardiologist Neto Ramirez MD.  Thank you for this very challenging consultation and participation in the patient’s care.  I will keep you apprised of all future developments.        Sincerely,     Yehuda Morgan M.D. Yehuda GIL M.D. MATT performed a substantial portion of the EM visit face-to-face with the same patient on the same date of service with the APRN, Sujey Wray. I was personally involved in reviewing and interpreting the films and conducted elements of the history and physical exam. I performed all of the medical decision making for the patient.

## 2019-04-10 ENCOUNTER — OFFICE VISIT (OUTPATIENT)
Dept: SURGERY | Facility: MEDICAL CENTER | Age: 79
End: 2019-04-10
Payer: MEDICARE

## 2019-04-10 VITALS
DIASTOLIC BLOOD PRESSURE: 78 MMHG | OXYGEN SATURATION: 96 % | BODY MASS INDEX: 25.24 KG/M2 | SYSTOLIC BLOOD PRESSURE: 142 MMHG | TEMPERATURE: 97.9 F | HEIGHT: 74 IN | WEIGHT: 196.65 LBS | HEART RATE: 56 BPM

## 2019-04-10 DIAGNOSIS — I34.0 SEVERE MITRAL REGURGITATION: ICD-10-CM

## 2019-04-10 PROCEDURE — 99205 OFFICE O/P NEW HI 60 MIN: CPT | Performed by: THORACIC SURGERY (CARDIOTHORACIC VASCULAR SURGERY)

## 2019-04-11 ENCOUNTER — HOSPITAL ENCOUNTER (OUTPATIENT)
Dept: CARDIOLOGY | Facility: MEDICAL CENTER | Age: 79
DRG: 229 | End: 2019-04-11
Attending: INTERNAL MEDICINE
Payer: MEDICARE

## 2019-04-11 ENCOUNTER — OFFICE VISIT (OUTPATIENT)
Dept: CARDIOLOGY | Facility: MEDICAL CENTER | Age: 79
End: 2019-04-11
Payer: MEDICARE

## 2019-04-11 ENCOUNTER — HOSPITAL ENCOUNTER (OUTPATIENT)
Dept: RADIOLOGY | Facility: MEDICAL CENTER | Age: 79
DRG: 229 | End: 2019-04-11
Attending: INTERNAL MEDICINE | Admitting: INTERNAL MEDICINE
Payer: MEDICARE

## 2019-04-11 ENCOUNTER — TELEPHONE (OUTPATIENT)
Dept: CARDIOLOGY | Facility: MEDICAL CENTER | Age: 79
End: 2019-04-11

## 2019-04-11 VITALS
WEIGHT: 196.4 LBS | HEIGHT: 74 IN | OXYGEN SATURATION: 97 % | HEART RATE: 67 BPM | DIASTOLIC BLOOD PRESSURE: 88 MMHG | SYSTOLIC BLOOD PRESSURE: 130 MMHG | BODY MASS INDEX: 25.21 KG/M2

## 2019-04-11 DIAGNOSIS — Z01.818 ENCOUNTER FOR PREOPERATIVE EXAMINATION FOR GENERAL SURGICAL PROCEDURE: ICD-10-CM

## 2019-04-11 DIAGNOSIS — I34.1 MITRAL PROLAPSE: ICD-10-CM

## 2019-04-11 DIAGNOSIS — Z86.79 S/P THORACIC AORTIC ANEURYSM REPAIR: ICD-10-CM

## 2019-04-11 DIAGNOSIS — Z98.890 S/P THORACIC AORTIC ANEURYSM REPAIR: ICD-10-CM

## 2019-04-11 DIAGNOSIS — Z95.2 S/P AVR (AORTIC VALVE REPLACEMENT): ICD-10-CM

## 2019-04-11 DIAGNOSIS — I34.0 SEVERE MITRAL REGURGITATION: ICD-10-CM

## 2019-04-11 DIAGNOSIS — Z95.0 BIVENTRICULAR CARDIAC PACEMAKER IN SITU: ICD-10-CM

## 2019-04-11 PROBLEM — R06.09 DYSPNEA ON EXERTION: Status: RESOLVED | Noted: 2019-01-30 | Resolved: 2019-04-11

## 2019-04-11 LAB
ABO GROUP BLD: NORMAL
ANION GAP SERPL CALC-SCNC: 5 MMOL/L (ref 0–11.9)
BLD GP AB SCN SERPL QL: NORMAL
BNP SERPL-MCNC: 75 PG/ML (ref 0–100)
BUN SERPL-MCNC: 24 MG/DL (ref 8–22)
CALCIUM SERPL-MCNC: 9.5 MG/DL (ref 8.5–10.5)
CHLORIDE SERPL-SCNC: 112 MMOL/L (ref 96–112)
CO2 SERPL-SCNC: 24 MMOL/L (ref 20–33)
CREAT SERPL-MCNC: 1.07 MG/DL (ref 0.5–1.4)
ERYTHROCYTE [DISTWIDTH] IN BLOOD BY AUTOMATED COUNT: 47.5 FL (ref 35.9–50)
GLUCOSE SERPL-MCNC: 106 MG/DL (ref 65–99)
HCT VFR BLD AUTO: 43.9 % (ref 42–52)
HGB BLD-MCNC: 14.2 G/DL (ref 14–18)
INR PPP: 1 (ref 0.87–1.13)
MCH RBC QN AUTO: 30.2 PG (ref 27–33)
MCHC RBC AUTO-ENTMCNC: 32.3 G/DL (ref 33.7–35.3)
MCV RBC AUTO: 93.4 FL (ref 81.4–97.8)
PLATELET # BLD AUTO: 114 K/UL (ref 164–446)
PMV BLD AUTO: 12.2 FL (ref 9–12.9)
POTASSIUM SERPL-SCNC: 3.9 MMOL/L (ref 3.6–5.5)
PROTHROMBIN TIME: 13.3 SEC (ref 12–14.6)
RBC # BLD AUTO: 4.7 M/UL (ref 4.7–6.1)
RH BLD: NORMAL
SODIUM SERPL-SCNC: 141 MMOL/L (ref 135–145)
WBC # BLD AUTO: 5.5 K/UL (ref 4.8–10.8)

## 2019-04-11 PROCEDURE — 71048 X-RAY EXAM CHEST 4+ VIEWS: CPT

## 2019-04-11 PROCEDURE — 99214 OFFICE O/P EST MOD 30 MIN: CPT | Performed by: INTERNAL MEDICINE

## 2019-04-11 PROCEDURE — 85610 PROTHROMBIN TIME: CPT

## 2019-04-11 PROCEDURE — 86850 RBC ANTIBODY SCREEN: CPT

## 2019-04-11 PROCEDURE — 85027 COMPLETE CBC AUTOMATED: CPT

## 2019-04-11 PROCEDURE — 86900 BLOOD TYPING SEROLOGIC ABO: CPT

## 2019-04-11 PROCEDURE — 80048 BASIC METABOLIC PNL TOTAL CA: CPT

## 2019-04-11 PROCEDURE — 94010 BREATHING CAPACITY TEST: CPT

## 2019-04-11 PROCEDURE — 36415 COLL VENOUS BLD VENIPUNCTURE: CPT

## 2019-04-11 PROCEDURE — 86901 BLOOD TYPING SEROLOGIC RH(D): CPT

## 2019-04-11 PROCEDURE — 83880 ASSAY OF NATRIURETIC PEPTIDE: CPT

## 2019-04-11 RX ORDER — SODIUM CHLORIDE 9 MG/ML
INJECTION, SOLUTION INTRAVENOUS CONTINUOUS
Status: CANCELLED | OUTPATIENT
Start: 2019-04-22

## 2019-04-11 ASSESSMENT — ENCOUNTER SYMPTOMS
ORTHOPNEA: 0
CHILLS: 0
DIZZINESS: 0
LOSS OF CONSCIOUSNESS: 0
MYALGIAS: 0
BLURRED VISION: 0
ABDOMINAL PAIN: 0
INSOMNIA: 0
FEVER: 0
PND: 0
SHORTNESS OF BREATH: 1
PALPITATIONS: 0

## 2019-04-11 ASSESSMENT — PULMONARY FUNCTION TESTS
PEFR: 350
FEV1_PREDICTED: 89%
FEV1/FVC: 74%
PEFR_PREDICTED: 71%
FEV1: 3.0
PREDICTED_VC: 87%

## 2019-04-11 NOTE — FLOWSHEET NOTE
04/11/19 1305   Pulmonary Function Group   Pulmonary Function Testing Performed Yes   #Bedside PFT Screen Yes      FEV1 3.0   VC 4.09   LETTY 74%   Predicted Values Yes   Predicted PEF 71%   Predicted FEV1 89%   Predicted VC 87%   Predicted LETTY 103%     Best of three blows with good patient effort.

## 2019-04-11 NOTE — PROGRESS NOTES
No chief complaint on file.      Subjective:   Ross Mcneal is a 79 y.o. male who presents today for interventional consult/MitraClip evaluation requested by Rashard Martin for severe symptomatic mitral regurgitation.    Thank you for allowing me to evaluate Mr. Mcneal, who as you know is a 79 year old male with complex cardiovascular history including severe mitral regurgitation, aortic valve replacement, ascending aortic aneurysm repair. He was well until 6 months ago when he began to experience shortness of breath. He denies fatigue, chest pain, dizziness or syncope.    Past Medical History:   Diagnosis Date   • Arthritis     hands   • Basal cell carcinoma of lip 2013   • Biventricular cardiac pacemaker in situ 3/4/2015   • Breath shortness    • Cancer (HCC)     prostate   • GERD (gastroesophageal reflux disease) 10/17/2013   • Heart burn    • Hypertension     • Indigestion    • Insomnia 10/17/2013   • LBBB (left bundle branch block)     • MEDICAL HOME    • Pacemaker    • Prostate cancer (HCC)    • PVCs (premature ventricular contractions) 3/4/2015   • S/P AVR (aortic valve replacement) 2013    redo; [place], [place]   • S/P prostatectomy    • S/P thoracic aortic aneurysm repair 2013   • Seasonal allergies 10/17/2013   • Valvular heart disease      Past Surgical History:   Procedure Laterality Date   • RECOVERY  2014    Performed by Cath-Recovery Surgery at SURGERY SAME DAY [facility]   • AORTIC VALVE REPLACEMENT  13    Bovine tissur valve with ascending aortic repair..  Done in [place].   • Z CARDIAC CATH  2013    normal coronaries prior to AVR.   • OTHER      carcinoma removed from lip   • APPENDECTOMY     • OTHER CARDIAC SURGERY   and     aVR. Thoracic aortic aneurysm repair.   • PROSTATECTOMY, RADIAL  .    prostate cancer.     Family History   Problem Relation Age of Onset   • Other Mother           at age 82. Parkinson's disease.   • Cancer Father          at age 68. Prostate cancer.   • Cancer Sister         Lung   • Heart Disease Neg Hx      Social History     Social History   • Marital status:      Spouse name: N/A   • Number of children: N/A   • Years of education: N/A     Occupational History   • Not on file.     Social History Main Topics   • Smoking status: Never Smoker   • Smokeless tobacco: Never Used   • Alcohol use Yes      Comment: 1/day   • Drug use: No   • Sexual activity: Yes     Partners: Female     Other Topics Concern   • Not on file     Social History Narrative    Retired . . Moved to [place] in .     No Known Allergies     Medications reviewed.    Outpatient Encounter Prescriptions as of 2019   Medication Sig Dispense Refill   • metoprolol SR (TOPROL XL) 25 MG TABLET SR 24 HR TAKE 1 TABLET BY MOUTH 1/2  HOUR AFTER DINNER AS  DIRECTED 90 Tab 3   • omeprazole (PRILOSEC) 20 MG delayed-release capsule Take 1 Cap by mouth every day. 90 Cap 3   • losartan (COZAAR) 50 MG Tab Take 1 Tab by mouth every day. 90 Tab 3   • fluticasone (FLONASE) 50 MCG/ACT nasal spray Spray 2 Sprays in nose every day. (Patient taking differently: Spray 2 Sprays in nose as needed.) 1 Bottle 0   • Cholecalciferol (VITAMIN D) 400 UNIT TABS Take 400 Units by mouth every day.     • DiphenhydrAMINE HCl (BENADRYL ALLERGY PO) Take 1 Each by mouth as needed. As needed for sleep      • aspirin 81 MG tablet Take 81 mg by mouth every day.       No facility-administered encounter medications on file as of 2019.      Review of Systems   Constitutional: Negative for chills and fever.   HENT: Negative for congestion.    Eyes: Negative for blurred vision.   Respiratory: Positive for shortness of breath.    Cardiovascular: Negative for chest pain, palpitations, orthopnea, leg swelling and PND.   Gastrointestinal: Negative for abdominal pain.   Genitourinary: Negative for dysuria.  "  Musculoskeletal: Negative for joint pain and myalgias.   Skin: Negative for rash.   Neurological: Negative for dizziness and loss of consciousness.   Psychiatric/Behavioral: The patient does not have insomnia.         Objective:   /88 (BP Location: Left arm, Patient Position: Sitting, BP Cuff Size: Adult)   Pulse 67   Ht 1.88 m (6' 2\")   Wt 89.1 kg (196 lb 6.4 oz)   SpO2 97%   BMI 25.22 kg/m²     Physical Exam   Constitutional: He is oriented to person, place, and time. He appears well-developed and well-nourished.   HENT:   Head: Normocephalic and atraumatic.   Eyes: Pupils are equal, round, and reactive to light. Conjunctivae are normal.   Neck: Normal range of motion. Neck supple.   Cardiovascular: Normal rate and regular rhythm.    Murmur heard.  Pulmonary/Chest: Effort normal and breath sounds normal.   Abdominal: Soft. Bowel sounds are normal.   Musculoskeletal: Normal range of motion.   Neurological: He is alert and oriented to person, place, and time.   Skin: Skin is warm and dry.   Psychiatric: He has a normal mood and affect.     CARDIAC STUDIES/PROCEDURES:    CARDIAC CATHETERIZATION CONCLUSIONS by Dr. Conte (03/12/19)  1.  Severe mitral regurgitation.  2.  Left ventricular ejection fraction 61% with normal wall motion.  3.  Coronary arteries are angiographically normal.  4.  Normal pulmonary pressures.  5.  Elevated bioprosthetic aortic valve pullback gradient.  6.  Elevated LVEDP.  (study result reviewed)    ECHOCARDIOGRAM CONCLUSIONS (10/17/18)  Normal left ventricular size and systolic function.  Left ventricular ejection fraction is visually estimated to be 65%.  Mild concentric left ventricular hypertrophy.  Known bioprosthetic aortic valve that is functioning normally with   normal transvalvular gradients.  Myxomatous mitral valve disease.  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  Severe mitral regurgitation.  Pacer/ICD wire seen in right " ventricle.  (study result reviewed)    EKG performed on (03/11/19) was reviewed: EKG shows paced rhythm.    Laboratory results of (03/11/19) were reviewed. Bun of 23 mg/dl, creatinine levels of 1.24 mg/dl noted.    TRANSESOPHAGEAL ECHOCARDIOGRAM CONCLUSIONS by Dr. Conte (02/12/19)  The left ventricle was normal in size and function.  Left ventricular ejection fraction is visually estimated to be 70%.  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  Severe mitral regurgitation.  Known bioprosthetic aortic valve that is functioning normally.  (study result reviewed)    Assessment:     1. Severe mitral regurgitation     2. S/P AVR (aortic valve replacement)     3. S/P thoracic aortic aneurysm repair     4. Biventricular cardiac pacemaker in situ       Medical Decision Making:  Today's Assessment / Status / Plan:     1. Severe mitral regurgitation: Severe mitral regurgitation was noted on his echocardiogram and he is symptomatic, NYHA class II. He is not a candidate for mitral valve repair per Dr. Morgan with STS score of 10%.  We will prceed with MitraClip. He understands the risks and benefits and agrees with plan. He does not want open heart surgery in the event that complications were to happen. ([place] mitral review 3/19/19): Bi-leaflet prolapse. Significant prolapse at A3/P3. Posteriorly directed eccentric jet. Leaflet length adequate to grasp. Valve leaflets appear clipable. Recommend XTr clip. Multiple clips likely required.)  2. History of surgery for bicuspid aortic valve with homograft valve at [facility] in 2000 and redo aortic valve replacement and ascending aortic aneurysm repair with #23 mm bioprosthetic valve and 28 mm tube graft at [facility] in 02/21/2013.  3. [employer] pacemaker placement by Jam Escobar (12/29/14): The pacemake is functioning well.  We will continue with device interrogation.  More than 45 minutes of time was  spent to review all above information, discuss the option of cardiac catheterization, transesophageal echocardiogram and MitraClip including, alternative options, risk and benefits.    The risks, benefits, and alternatives to coronary angiography with IV sedation were discussed in great detail. Specific risks mentioned include bleeding, infection, kidney damage, allergic reaction, cardiac perforation with possible tamponade requiring thalia-cardiocentesis or possible open heart surgery. In addition, we discussed that 10% of patients will experience small to moderate bruising at the side of the arterial puncture. Lastly the risks of heart attack, stroke, and death were discussed; the risks of major complications such as heart attack or stroke caused by the angiogram is less than 1%; the risk of death is approximately 1 in 1000. The patient verbalized understanding of these potential complications and wishes to proceed with this procedure.    The risks, benefits, and alternatives to MitraClip, general anesthesia and transesophageal echocardiogram were discussed in great detail. Specific risks mentioned include bleeding, infection, kidney damage, allergic reaction, cardiac perforation with possible tamponade requiring thalia-cardiocentesis or possible open heart surgery if the patient is a candidate. Lastly the risks of heart attack, stroke, and death were discussed; the risks of major complications including  all cause mortality of 2.2%, stroke 0.9%, major bleeding complication is 7.4, pericardial tamponade 1.9%, clip specific complications (embolization 0%, partial clip detachment was 1.9%). The patient verbalized understanding of these potential complications and wishes to proceed with this procedure. (TRAMI registry 2015).  The procedure will be performed completely in collaboration with cardiac surgery.    We will follow up in one month in valve clinic.    Thank you for this consult.    CC Scottie Chang

## 2019-04-11 NOTE — LETTER
[facility]   [address]  [place] [address]  Phone: [phone]  Fax: [phone]              Ross Mcneal  1940    Encounter Date: 4/11/2019    Neto Ramirez M.D.          PROGRESS NOTE:  No chief complaint on file.      Subjective:   oRss Mcneal is a 79 y.o. male who presents today for interventional consult/MitraClip evaluation requested by Rashard Martin for severe symptomatic mitral regurgitation.    Thank you for allowing me to evaluate Mr. Mcneal, who as you know is a 79 year old male with complex cardiovascular history including severe mitral regurgitation, aortic valve replacement, ascending aortic aneurysm repair. He was well until 6 months ago when he began to experience shortness of breath. He denies fatigue, chest pain, dizziness or syncope.    Past Medical History:   Diagnosis Date   • Arthritis     hands   • Basal cell carcinoma of lip 11/27/2013   • Biventricular cardiac pacemaker in situ 3/4/2015   • Breath shortness    • Cancer (HCC)     prostate   • GERD (gastroesophageal reflux disease) 10/17/2013   • Heart burn    • Hypertension     • Indigestion    • Insomnia 10/17/2013   • LBBB (left bundle branch block)     • MEDICAL HOME    • Pacemaker    • Prostate cancer (HCC) 2003   • PVCs (premature ventricular contractions) 3/4/2015   • S/P AVR (aortic valve replacement) 2/21/2013    redo; [place], [place]   • S/P prostatectomy 2003   • S/P thoracic aortic aneurysm repair 2/21/2013   • Seasonal allergies 10/17/2013     Past Surgical History:   Procedure Laterality Date   • RECOVERY  12/29/2014    Performed by Cath-Recovery Surgery at SURGERY SAME DAY [facility]   • AORTIC VALVE REPLACEMENT  2/21/13    Bovine tissur valve with ascending aortic repair..  Done in [place].   • ZZZ CARDIAC CATH  2/2013    normal coronaries prior to AVR.   • OTHER  2013    carcinoma removed from  lip   • APPENDECTOMY     • OTHER CARDIAC SURGERY   and     aVR. Thoracic aortic aneurysm repair.   • PROSTATECTOMY, RADIAL  .    prostate cancer.     Family History   Problem Relation Age of Onset   • Other Mother          at age 82. Parkinson's disease.   • Cancer Father          at age 68. Prostate cancer.   • Cancer Sister         Lung     Social History     Social History   • Marital status:      Spouse name: N/A   • Number of children: N/A   • Years of education: N/A     Occupational History   • Not on file.     Social History Main Topics   • Smoking status: Never Smoker   • Smokeless tobacco: Never Used   • Alcohol use Yes      Comment: 1/day   • Drug use: No   • Sexual activity: Yes     Partners: Female     Other Topics Concern   • Not on file     Social History Narrative    Retired . . Moved to [place] in .     No Known Allergies     Medications reviewed.    Outpatient Encounter Prescriptions as of 2019   Medication Sig Dispense Refill   • metoprolol SR (TOPROL XL) 25 MG TABLET SR 24 HR TAKE 1 TABLET BY MOUTH 1/2  HOUR AFTER DINNER AS  DIRECTED 90 Tab 3   • omeprazole (PRILOSEC) 20 MG delayed-release capsule Take 1 Cap by mouth every day. 90 Cap 3   • losartan (COZAAR) 50 MG Tab Take 1 Tab by mouth every day. 90 Tab 3   • fluticasone (FLONASE) 50 MCG/ACT nasal spray Spray 2 Sprays in nose every day. (Patient taking differently: Spray 2 Sprays in nose as needed.) 1 Bottle 0   • Cholecalciferol (VITAMIN D) 400 UNIT TABS Take 400 Units by mouth every day.     • DiphenhydrAMINE HCl (BENADRYL ALLERGY PO) Take 1 Each by mouth as needed. As needed for sleep      • aspirin 81 MG tablet Take 81 mg by mouth every day.       No facility-administered encounter medications on file as of 2019.      Review of Systems   Constitutional: Negative for chills and fever.   HENT: Negative for congestion.    Eyes: Negative for blurred vision.   Respiratory: Positive for  "shortness of breath.    Cardiovascular: Negative for chest pain, palpitations, orthopnea, leg swelling and PND.   Gastrointestinal: Negative for abdominal pain.   Genitourinary: Negative for dysuria.   Musculoskeletal: Negative for joint pain and myalgias.   Skin: Negative for rash.   Neurological: Negative for dizziness and loss of consciousness.   Psychiatric/Behavioral: The patient does not have insomnia.         Objective:   /88 (BP Location: Left arm, Patient Position: Sitting, BP Cuff Size: Adult)   Pulse 67   Ht 1.88 m (6' 2\")   Wt 89.1 kg (196 lb 6.4 oz)   SpO2 97%   BMI 25.22 kg/m²      Physical Exam   Constitutional: He is oriented to person, place, and time. He appears well-developed and well-nourished.   HENT:   Head: Normocephalic and atraumatic.   Eyes: Pupils are equal, round, and reactive to light. Conjunctivae are normal.   Neck: Normal range of motion. Neck supple.   Cardiovascular: Normal rate and regular rhythm.    Murmur heard.  Pulmonary/Chest: Effort normal and breath sounds normal.   Abdominal: Soft. Bowel sounds are normal.   Musculoskeletal: Normal range of motion.   Neurological: He is alert and oriented to person, place, and time.   Skin: Skin is warm and dry.   Psychiatric: He has a normal mood and affect.     CARDIAC STUDIES/PROCEDURES:    ECHOCARDIOGRAM CONCLUSIONS (10/17/18)  Normal left ventricular size and systolic function.  Left ventricular ejection fraction is visually estimated to be 65%.  Mild concentric left ventricular hypertrophy.  Known bioprosthetic aortic valve that is functioning normally with   normal transvalvular gradients.  Myxomatous mitral valve disease.  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  Severe mitral regurgitation.  Pacer/ICD wire seen in right ventricle.  (study result reviewed)    EKG performed on (03/11/19) was reviewed: EKG shows paced rhythm.    Laboratory results of (03/11/19) were reviewed. Bun of 23 mg/dl, " creatinine levels of 1.24 mg/dl noted.    TRANSESOPHAGEAL ECHOCARDIOGRAM CONCLUSIONS by Dr. Conte (02/12/19)  The left ventricle was normal in size and function.  Left ventricular ejection fraction is visually estimated to be 70%.  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  Severe mitral regurgitation.  Known bioprosthetic aortic valve that is functioning normally.  (study result reviewed)    Assessment:     1. Severe mitral regurgitation     2. S/P AVR (aortic valve replacement)     3. S/P thoracic aortic aneurysm repair     4. Biventricular cardiac pacemaker in situ       Medical Decision Making:  Today's Assessment / Status / Plan:     1. Severe mitral regurgitation: Severe mitral regurgitation was noted on his echocardiogram and he is symptomatic, NYHA class II. He is not a candidate for mitral valve repair per Dr. Morgan with STS score of 10%.  We will prceed with MitraClip. He understands the risks and benefits and agrees with plan. He does not want open heart surgery in the event that complications were to happen. ([place] mitral review 3/19/19): Bi-leaflet prolapse. Significant prolapse at A3/P3. Posteriorly directed eccentric jet. Leaflet length adequate to grasp. Valve leaflets appear clipable. Recommend XTr clip. Multiple clips likely required.)  2. History of surgery for bicuspid aortic valve with homograft valve at [facility] in 2000 and redo aortic valve replacement and ascending aortic aneurysm repair with  #23 mm bioprosthetic valve and 28 mm tube graft  at hospitals in 02/21/2013.  3. [employer] pacemaker placement by Jam Escobar (12/29/14): The pacemake is functioning well.  We will continue with device interrogation.  More than 45 minutes of time was spent to review all above information, discuss the option of cardiac catheterization, transesophageal echocardiogram and MitraClip including, alternative options, risk and  benefits.    The risks, benefits, and alternatives to coronary angiography with IV sedation were discussed in great detail. Specific risks mentioned include bleeding, infection, kidney damage, allergic reaction, cardiac perforation with possible tamponade requiring thalia-cardiocentesis or possible open heart surgery. In addition, we discussed that 10% of patients will experience small to moderate bruising at the side of the arterial puncture. Lastly the risks of heart attack, stroke, and death were discussed; the risks of major complications such as heart attack or stroke caused by the angiogram is less than 1%; the risk of death is approximately 1 in 1000. The patient verbalized understanding of these potential complications and wishes to proceed with this procedure.    The risks, benefits, and alternatives to MitraClip, general anesthesia and transesophageal echocardiogram were discussed in great detail. Specific risks mentioned include bleeding, infection, kidney damage, allergic reaction, cardiac perforation with possible tamponade requiring thalia-cardiocentesis or possible open heart surgery if the patient is a candidate. Lastly the risks of heart attack, stroke, and death were discussed; the risks of major complications including  all cause mortality of 2.2%, stroke 0.9%, major bleeding complication is 7.4, pericardial tamponade 1.9%, clip specific complications (embolization 0%, partial clip detachment was 1.9%). The patient verbalized understanding of these potential complications and wishes to proceed with this procedure. (TRAMI registry 2015).  The procedure will be performed completely in collaboration with cardiac surgery.    We will follow up in one month in valve clinic.    Thank you for this consult.    CC Scottie Chang

## 2019-04-18 ENCOUNTER — TELEPHONE (OUTPATIENT)
Dept: CARDIOLOGY | Facility: MEDICAL CENTER | Age: 79
End: 2019-04-18

## 2019-04-18 NOTE — TELEPHONE ENCOUNTER
Valve Conference Heart Team Plan of Care: 4/17/19    TMVR candidate: Yes  With Possible open heart:  No  Access: TF  Valve Size: 1-2 clips  Anesthesia: GA   Unit: Tele  Incidental findings:none  Clearance needed prior to procedure: none  Plan of care: Proceed with TMVR as planned 4/22/19.       Spoke with patient and wife Anita and made aware of heart team's recommendations. Reviewed pre-op instructions including: check in 0800, NPO midnight prior, bring list of medications upon check in.  Patient agrees with the plan and states no further questions at this time.

## 2019-04-22 ENCOUNTER — ANESTHESIA (OUTPATIENT)
Dept: SURGERY | Facility: MEDICAL CENTER | Age: 79
DRG: 229 | End: 2019-04-22
Payer: MEDICARE

## 2019-04-22 ENCOUNTER — HOSPITAL ENCOUNTER (INPATIENT)
Facility: MEDICAL CENTER | Age: 79
LOS: 1 days | DRG: 229 | End: 2019-04-23
Attending: INTERNAL MEDICINE | Admitting: INTERNAL MEDICINE
Payer: MEDICARE

## 2019-04-22 ENCOUNTER — APPOINTMENT (OUTPATIENT)
Dept: RADIOLOGY | Facility: MEDICAL CENTER | Age: 79
DRG: 229 | End: 2019-04-22
Attending: NURSE PRACTITIONER
Payer: MEDICARE

## 2019-04-22 ENCOUNTER — ANESTHESIA EVENT (OUTPATIENT)
Dept: SURGERY | Facility: MEDICAL CENTER | Age: 79
DRG: 229 | End: 2019-04-22
Payer: MEDICARE

## 2019-04-22 ENCOUNTER — APPOINTMENT (OUTPATIENT)
Dept: CARDIOLOGY | Facility: MEDICAL CENTER | Age: 79
DRG: 229 | End: 2019-04-22
Attending: INTERNAL MEDICINE
Payer: MEDICARE

## 2019-04-22 DIAGNOSIS — I34.0 NON-RHEUMATIC MITRAL REGURGITATION: ICD-10-CM

## 2019-04-22 DIAGNOSIS — Z01.818 ENCOUNTER FOR PREOPERATIVE EXAMINATION FOR GENERAL SURGICAL PROCEDURE: ICD-10-CM

## 2019-04-22 DIAGNOSIS — I34.1 MVP (MITRAL VALVE PROLAPSE): ICD-10-CM

## 2019-04-22 LAB
ABO GROUP BLD: NORMAL
ACT BLD: 120 SEC (ref 74–137)
ACT BLD: 252 SEC (ref 74–137)
ACT BLD: 296 SEC (ref 74–137)
ANION GAP SERPL CALC-SCNC: 8 MMOL/L (ref 0–11.9)
BNP SERPL-MCNC: 63 PG/ML (ref 0–100)
BUN SERPL-MCNC: 22 MG/DL (ref 8–22)
CALCIUM SERPL-MCNC: 8.8 MG/DL (ref 8.5–10.5)
CHLORIDE SERPL-SCNC: 109 MMOL/L (ref 96–112)
CO2 SERPL-SCNC: 21 MMOL/L (ref 20–33)
CREAT SERPL-MCNC: 1.08 MG/DL (ref 0.5–1.4)
EKG IMPRESSION: NORMAL
ERYTHROCYTE [DISTWIDTH] IN BLOOD BY AUTOMATED COUNT: 45.5 FL (ref 35.9–50)
GLUCOSE SERPL-MCNC: 124 MG/DL (ref 65–99)
HCT VFR BLD AUTO: 40.5 % (ref 42–52)
HGB BLD-MCNC: 13.2 G/DL (ref 14–18)
LV EJECT FRACT  99904: 55
MCH RBC QN AUTO: 29.7 PG (ref 27–33)
MCHC RBC AUTO-ENTMCNC: 32.6 G/DL (ref 33.7–35.3)
MCV RBC AUTO: 91.2 FL (ref 81.4–97.8)
PLATELET # BLD AUTO: 106 K/UL (ref 164–446)
PMV BLD AUTO: 12.2 FL (ref 9–12.9)
POTASSIUM SERPL-SCNC: 4 MMOL/L (ref 3.6–5.5)
RBC # BLD AUTO: 4.44 M/UL (ref 4.7–6.1)
RH BLD: NORMAL
SODIUM SERPL-SCNC: 138 MMOL/L (ref 135–145)
WBC # BLD AUTO: 5.8 K/UL (ref 4.8–10.8)

## 2019-04-22 PROCEDURE — C1894 INTRO/SHEATH, NON-LASER: HCPCS | Performed by: INTERNAL MEDICINE

## 2019-04-22 PROCEDURE — 503001 HCHG PERFUSION: Performed by: INTERNAL MEDICINE

## 2019-04-22 PROCEDURE — 770020 HCHG ROOM/CARE - TELE (206)

## 2019-04-22 PROCEDURE — 160002 HCHG RECOVERY MINUTES (STAT): Performed by: INTERNAL MEDICINE

## 2019-04-22 PROCEDURE — 80048 BASIC METABOLIC PNL TOTAL CA: CPT

## 2019-04-22 PROCEDURE — 700111 HCHG RX REV CODE 636 W/ 250 OVERRIDE (IP): Performed by: ANESTHESIOLOGY

## 2019-04-22 PROCEDURE — 502000 HCHG MISC OR IMPLANTS RC 0278: Performed by: INTERNAL MEDICINE

## 2019-04-22 PROCEDURE — 85347 COAGULATION TIME ACTIVATED: CPT | Mod: 91

## 2019-04-22 PROCEDURE — B24BZZ4 ULTRASONOGRAPHY OF HEART WITH AORTA, TRANSESOPHAGEAL: ICD-10-PCS | Performed by: INTERNAL MEDICINE

## 2019-04-22 PROCEDURE — A9270 NON-COVERED ITEM OR SERVICE: HCPCS | Performed by: NURSE PRACTITIONER

## 2019-04-22 PROCEDURE — 700105 HCHG RX REV CODE 258: Performed by: ANESTHESIOLOGY

## 2019-04-22 PROCEDURE — 02UG3JZ SUPPLEMENT MITRAL VALVE WITH SYNTHETIC SUBSTITUTE, PERCUTANEOUS APPROACH: ICD-10-PCS | Performed by: INTERNAL MEDICINE

## 2019-04-22 PROCEDURE — 700102 HCHG RX REV CODE 250 W/ 637 OVERRIDE(OP): Performed by: NURSE PRACTITIONER

## 2019-04-22 PROCEDURE — 503000 HCHG SUTURE, OHS: Performed by: INTERNAL MEDICINE

## 2019-04-22 PROCEDURE — 500002 HCHG ADHESIVE, DERMABOND: Performed by: INTERNAL MEDICINE

## 2019-04-22 PROCEDURE — A6402 STERILE GAUZE <= 16 SQ IN: HCPCS | Performed by: INTERNAL MEDICINE

## 2019-04-22 PROCEDURE — 502240 HCHG MISC OR SUPPLY RC 0272: Performed by: INTERNAL MEDICINE

## 2019-04-22 PROCEDURE — 160042 HCHG SURGERY MINUTES - EA ADDL 1 MIN LEVEL 5: Performed by: INTERNAL MEDICINE

## 2019-04-22 PROCEDURE — 160031 HCHG SURGERY MINUTES - 1ST 30 MINS LEVEL 5: Performed by: INTERNAL MEDICINE

## 2019-04-22 PROCEDURE — 160048 HCHG OR STATISTICAL LEVEL 1-5: Performed by: INTERNAL MEDICINE

## 2019-04-22 PROCEDURE — 700105 HCHG RX REV CODE 258: Performed by: NURSE PRACTITIONER

## 2019-04-22 PROCEDURE — C1893 INTRO/SHEATH, FIXED,NON-PEEL: HCPCS | Performed by: INTERNAL MEDICINE

## 2019-04-22 PROCEDURE — 93355 ECHO TRANSESOPHAGEAL (TEE): CPT

## 2019-04-22 PROCEDURE — C1725 CATH, TRANSLUMIN NON-LASER: HCPCS | Performed by: INTERNAL MEDICINE

## 2019-04-22 PROCEDURE — 700101 HCHG RX REV CODE 250: Performed by: ANESTHESIOLOGY

## 2019-04-22 PROCEDURE — 93005 ELECTROCARDIOGRAM TRACING: CPT | Performed by: NURSE PRACTITIONER

## 2019-04-22 PROCEDURE — 93010 ELECTROCARDIOGRAM REPORT: CPT | Performed by: INTERNAL MEDICINE

## 2019-04-22 PROCEDURE — 160009 HCHG ANES TIME/MIN: Performed by: INTERNAL MEDICINE

## 2019-04-22 PROCEDURE — 160035 HCHG PACU - 1ST 60 MINS PHASE I: Performed by: INTERNAL MEDICINE

## 2019-04-22 PROCEDURE — 33418 REPAIR TCAT MITRAL VALVE: CPT | Mod: 62,Q0 | Performed by: THORACIC SURGERY (CARDIOTHORACIC VASCULAR SURGERY)

## 2019-04-22 PROCEDURE — 71045 X-RAY EXAM CHEST 1 VIEW: CPT

## 2019-04-22 PROCEDURE — 501789 HCHG NRG RF TRANSSEPTAL NEEDLE BAYLIS: Performed by: INTERNAL MEDICINE

## 2019-04-22 PROCEDURE — 33418 REPAIR TCAT MITRAL VALVE: CPT | Mod: 62,Q0 | Performed by: INTERNAL MEDICINE

## 2019-04-22 PROCEDURE — 85027 COMPLETE CBC AUTOMATED: CPT

## 2019-04-22 PROCEDURE — 160036 HCHG PACU - EA ADDL 30 MINS PHASE I: Performed by: INTERNAL MEDICINE

## 2019-04-22 PROCEDURE — 83880 ASSAY OF NATRIURETIC PEPTIDE: CPT

## 2019-04-22 DEVICE — IMPLANTABLE DEVICE: Type: IMPLANTABLE DEVICE | Status: FUNCTIONAL

## 2019-04-22 RX ORDER — SODIUM CHLORIDE, SODIUM LACTATE, POTASSIUM CHLORIDE, CALCIUM CHLORIDE 600; 310; 30; 20 MG/100ML; MG/100ML; MG/100ML; MG/100ML
INJECTION, SOLUTION INTRAVENOUS
Status: DISCONTINUED | OUTPATIENT
Start: 2019-04-22 | End: 2019-04-22 | Stop reason: SURG

## 2019-04-22 RX ORDER — FLUTICASONE PROPIONATE 50 MCG
2 SPRAY, SUSPENSION (ML) NASAL
Status: ON HOLD | COMMUNITY
End: 2022-08-16

## 2019-04-22 RX ORDER — CEFAZOLIN SODIUM 1 G/3ML
INJECTION, POWDER, FOR SOLUTION INTRAMUSCULAR; INTRAVENOUS PRN
Status: DISCONTINUED | OUTPATIENT
Start: 2019-04-22 | End: 2019-04-22 | Stop reason: SURG

## 2019-04-22 RX ORDER — FLUTICASONE PROPIONATE 50 MCG
2 SPRAY, SUSPENSION (ML) NASAL
Status: DISCONTINUED | OUTPATIENT
Start: 2019-04-22 | End: 2019-04-23 | Stop reason: HOSPADM

## 2019-04-22 RX ORDER — OXYCODONE HCL 5 MG/5 ML
5 SOLUTION, ORAL ORAL
Status: DISCONTINUED | OUTPATIENT
Start: 2019-04-22 | End: 2019-04-22 | Stop reason: HOSPADM

## 2019-04-22 RX ORDER — PROTAMINE SULFATE 10 MG/ML
INJECTION, SOLUTION INTRAVENOUS PRN
Status: DISCONTINUED | OUTPATIENT
Start: 2019-04-22 | End: 2019-04-22 | Stop reason: SURG

## 2019-04-22 RX ORDER — DIPHENHYDRAMINE HCL 25 MG
25 TABLET ORAL NIGHTLY PRN
Status: DISCONTINUED | OUTPATIENT
Start: 2019-04-22 | End: 2019-04-23 | Stop reason: HOSPADM

## 2019-04-22 RX ORDER — ONDANSETRON 2 MG/ML
4 INJECTION INTRAMUSCULAR; INTRAVENOUS EVERY 4 HOURS PRN
Status: DISCONTINUED | OUTPATIENT
Start: 2019-04-22 | End: 2019-04-23 | Stop reason: HOSPADM

## 2019-04-22 RX ORDER — AMOXICILLIN 250 MG
2 CAPSULE ORAL 2 TIMES DAILY PRN
Status: DISCONTINUED | OUTPATIENT
Start: 2019-04-22 | End: 2019-04-23 | Stop reason: HOSPADM

## 2019-04-22 RX ORDER — BISACODYL 10 MG
10 SUPPOSITORY, RECTAL RECTAL
Status: DISCONTINUED | OUTPATIENT
Start: 2019-04-22 | End: 2019-04-23 | Stop reason: HOSPADM

## 2019-04-22 RX ORDER — MEPERIDINE HYDROCHLORIDE 25 MG/ML
6.25 INJECTION INTRAMUSCULAR; INTRAVENOUS; SUBCUTANEOUS
Status: DISCONTINUED | OUTPATIENT
Start: 2019-04-22 | End: 2019-04-22 | Stop reason: HOSPADM

## 2019-04-22 RX ORDER — SODIUM CHLORIDE, SODIUM LACTATE, POTASSIUM CHLORIDE, CALCIUM CHLORIDE 600; 310; 30; 20 MG/100ML; MG/100ML; MG/100ML; MG/100ML
INJECTION, SOLUTION INTRAVENOUS CONTINUOUS
Status: DISCONTINUED | OUTPATIENT
Start: 2019-04-22 | End: 2019-04-22 | Stop reason: HOSPADM

## 2019-04-22 RX ORDER — LOSARTAN POTASSIUM 50 MG/1
50 TABLET ORAL DAILY
Status: DISCONTINUED | OUTPATIENT
Start: 2019-04-22 | End: 2019-04-23 | Stop reason: HOSPADM

## 2019-04-22 RX ORDER — SODIUM CHLORIDE 9 MG/ML
INJECTION, SOLUTION INTRAVENOUS CONTINUOUS
Status: DISCONTINUED | OUTPATIENT
Start: 2019-04-22 | End: 2019-04-22

## 2019-04-22 RX ORDER — HYDROMORPHONE HYDROCHLORIDE 1 MG/ML
0.4 INJECTION, SOLUTION INTRAMUSCULAR; INTRAVENOUS; SUBCUTANEOUS
Status: DISCONTINUED | OUTPATIENT
Start: 2019-04-22 | End: 2019-04-22 | Stop reason: HOSPADM

## 2019-04-22 RX ORDER — DIPHENHYDRAMINE HCL 25 MG
25 TABLET ORAL
Status: DISCONTINUED | OUTPATIENT
Start: 2019-04-22 | End: 2019-04-23 | Stop reason: HOSPADM

## 2019-04-22 RX ORDER — LIDOCAINE HYDROCHLORIDE 40 MG/ML
SOLUTION TOPICAL PRN
Status: DISCONTINUED | OUTPATIENT
Start: 2019-04-22 | End: 2019-04-22 | Stop reason: SURG

## 2019-04-22 RX ORDER — DIPHENHYDRAMINE HYDROCHLORIDE 50 MG/ML
25 INJECTION INTRAMUSCULAR; INTRAVENOUS EVERY 6 HOURS PRN
Status: DISCONTINUED | OUTPATIENT
Start: 2019-04-22 | End: 2019-04-23 | Stop reason: HOSPADM

## 2019-04-22 RX ORDER — METOPROLOL SUCCINATE 25 MG/1
12.5 TABLET, EXTENDED RELEASE ORAL EVERY EVENING
Status: DISCONTINUED | OUTPATIENT
Start: 2019-04-22 | End: 2019-04-23 | Stop reason: HOSPADM

## 2019-04-22 RX ORDER — DIPHENHYDRAMINE HYDROCHLORIDE 50 MG/ML
12.5 INJECTION INTRAMUSCULAR; INTRAVENOUS
Status: DISCONTINUED | OUTPATIENT
Start: 2019-04-22 | End: 2019-04-22 | Stop reason: HOSPADM

## 2019-04-22 RX ORDER — POLYETHYLENE GLYCOL 3350 17 G/17G
1 POWDER, FOR SOLUTION ORAL
Status: DISCONTINUED | OUTPATIENT
Start: 2019-04-22 | End: 2019-04-23 | Stop reason: HOSPADM

## 2019-04-22 RX ORDER — HALOPERIDOL 5 MG/ML
1 INJECTION INTRAMUSCULAR
Status: DISCONTINUED | OUTPATIENT
Start: 2019-04-22 | End: 2019-04-22 | Stop reason: HOSPADM

## 2019-04-22 RX ORDER — SODIUM CHLORIDE 9 MG/ML
INJECTION, SOLUTION INTRAVENOUS CONTINUOUS
Status: DISPENSED | OUTPATIENT
Start: 2019-04-22 | End: 2019-04-22

## 2019-04-22 RX ORDER — HYDROMORPHONE HYDROCHLORIDE 1 MG/ML
0.1 INJECTION, SOLUTION INTRAMUSCULAR; INTRAVENOUS; SUBCUTANEOUS
Status: DISCONTINUED | OUTPATIENT
Start: 2019-04-22 | End: 2019-04-22 | Stop reason: HOSPADM

## 2019-04-22 RX ORDER — ASPIRIN 81 MG/1
81 TABLET, CHEWABLE ORAL EVERY EVENING
Status: DISCONTINUED | OUTPATIENT
Start: 2019-04-22 | End: 2019-04-23 | Stop reason: HOSPADM

## 2019-04-22 RX ORDER — DEXAMETHASONE SODIUM PHOSPHATE 4 MG/ML
INJECTION, SOLUTION INTRA-ARTICULAR; INTRALESIONAL; INTRAMUSCULAR; INTRAVENOUS; SOFT TISSUE PRN
Status: DISCONTINUED | OUTPATIENT
Start: 2019-04-22 | End: 2019-04-22 | Stop reason: SURG

## 2019-04-22 RX ORDER — HYDROMORPHONE HYDROCHLORIDE 1 MG/ML
0.2 INJECTION, SOLUTION INTRAMUSCULAR; INTRAVENOUS; SUBCUTANEOUS
Status: DISCONTINUED | OUTPATIENT
Start: 2019-04-22 | End: 2019-04-22 | Stop reason: HOSPADM

## 2019-04-22 RX ORDER — HYDRALAZINE HYDROCHLORIDE 20 MG/ML
10 INJECTION INTRAMUSCULAR; INTRAVENOUS
Status: DISCONTINUED | OUTPATIENT
Start: 2019-04-22 | End: 2019-04-23 | Stop reason: HOSPADM

## 2019-04-22 RX ORDER — ACETAMINOPHEN 325 MG/1
650 TABLET ORAL EVERY 6 HOURS PRN
Status: DISCONTINUED | OUTPATIENT
Start: 2019-04-22 | End: 2019-04-23 | Stop reason: HOSPADM

## 2019-04-22 RX ORDER — HEPARIN SODIUM,PORCINE 1000/ML
VIAL (ML) INJECTION PRN
Status: DISCONTINUED | OUTPATIENT
Start: 2019-04-22 | End: 2019-04-22 | Stop reason: SURG

## 2019-04-22 RX ORDER — ONDANSETRON 2 MG/ML
4 INJECTION INTRAMUSCULAR; INTRAVENOUS
Status: DISCONTINUED | OUTPATIENT
Start: 2019-04-22 | End: 2019-04-22 | Stop reason: HOSPADM

## 2019-04-22 RX ORDER — OXYCODONE HCL 5 MG/5 ML
10 SOLUTION, ORAL ORAL
Status: DISCONTINUED | OUTPATIENT
Start: 2019-04-22 | End: 2019-04-22 | Stop reason: HOSPADM

## 2019-04-22 RX ORDER — OMEPRAZOLE 20 MG/1
20 CAPSULE, DELAYED RELEASE ORAL DAILY
Status: DISCONTINUED | OUTPATIENT
Start: 2019-04-22 | End: 2019-04-23 | Stop reason: HOSPADM

## 2019-04-22 RX ADMIN — CEFAZOLIN 2 G: 330 INJECTION, POWDER, FOR SOLUTION INTRAMUSCULAR; INTRAVENOUS at 11:45

## 2019-04-22 RX ADMIN — SUGAMMADEX 200 MG: 100 INJECTION, SOLUTION INTRAVENOUS at 13:35

## 2019-04-22 RX ADMIN — METOPROLOL SUCCINATE 12.5 MG: 25 TABLET, EXTENDED RELEASE ORAL at 18:11

## 2019-04-22 RX ADMIN — HEPARIN SODIUM 5000 UNITS: 1000 INJECTION, SOLUTION INTRAVENOUS; SUBCUTANEOUS at 13:10

## 2019-04-22 RX ADMIN — SODIUM CHLORIDE: 9 INJECTION, SOLUTION INTRAVENOUS at 18:07

## 2019-04-22 RX ADMIN — HEPARIN SODIUM 10000 UNITS: 1000 INJECTION, SOLUTION INTRAVENOUS; SUBCUTANEOUS at 12:39

## 2019-04-22 RX ADMIN — EPHEDRINE SULFATE 10 MG: 50 INJECTION INTRAMUSCULAR; INTRAVENOUS; SUBCUTANEOUS at 11:59

## 2019-04-22 RX ADMIN — PROTAMINE SULFATE 50 MG: 10 INJECTION, SOLUTION INTRAVENOUS at 13:30

## 2019-04-22 RX ADMIN — PROPOFOL 200 MG: 10 INJECTION, EMULSION INTRAVENOUS at 11:38

## 2019-04-22 RX ADMIN — LIDOCAINE HYDROCHLORIDE 4 ML: 40 SOLUTION TOPICAL at 11:38

## 2019-04-22 RX ADMIN — HEPARIN SODIUM 5000 UNITS: 1000 INJECTION, SOLUTION INTRAVENOUS; SUBCUTANEOUS at 12:28

## 2019-04-22 RX ADMIN — SODIUM CHLORIDE, POTASSIUM CHLORIDE, SODIUM LACTATE AND CALCIUM CHLORIDE: 600; 310; 30; 20 INJECTION, SOLUTION INTRAVENOUS at 11:29

## 2019-04-22 RX ADMIN — DEXAMETHASONE SODIUM PHOSPHATE 4 MG: 4 INJECTION, SOLUTION INTRA-ARTICULAR; INTRALESIONAL; INTRAMUSCULAR; INTRAVENOUS; SOFT TISSUE at 13:30

## 2019-04-22 RX ADMIN — ASPIRIN 81 MG 81 MG: 81 TABLET ORAL at 18:11

## 2019-04-22 RX ADMIN — ROCURONIUM BROMIDE 50 MG: 10 INJECTION, SOLUTION INTRAVENOUS at 11:38

## 2019-04-22 ASSESSMENT — PATIENT HEALTH QUESTIONNAIRE - PHQ9
1. LITTLE INTEREST OR PLEASURE IN DOING THINGS: NOT AT ALL
2. FEELING DOWN, DEPRESSED, IRRITABLE, OR HOPELESS: NOT AT ALL
SUM OF ALL RESPONSES TO PHQ9 QUESTIONS 1 AND 2: 0

## 2019-04-22 ASSESSMENT — ENCOUNTER SYMPTOMS
CONSTITUTIONAL NEGATIVE: 1
NEUROLOGICAL NEGATIVE: 1
WEAKNESS: 1
PALPITATIONS: 0
HEADACHES: 0
WEAKNESS: 0
PSYCHIATRIC NEGATIVE: 1
RESPIRATORY NEGATIVE: 1
DIZZINESS: 0
EYES NEGATIVE: 1
ABDOMINAL PAIN: 0
SHORTNESS OF BREATH: 0
DOUBLE VISION: 0
WEIGHT LOSS: 0
GASTROINTESTINAL NEGATIVE: 1
NAUSEA: 0
SHORTNESS OF BREATH: 1
CHILLS: 0
FOCAL WEAKNESS: 0
CLAUDICATION: 0
BRUISES/BLEEDS EASILY: 0
COUGH: 0
FEVER: 0
MYALGIAS: 0
CARDIOVASCULAR NEGATIVE: 1
BLURRED VISION: 0
DEPRESSION: 0
NERVOUS/ANXIOUS: 0
VOMITING: 0
MUSCULOSKELETAL NEGATIVE: 1

## 2019-04-22 ASSESSMENT — COGNITIVE AND FUNCTIONAL STATUS - GENERAL
MOBILITY SCORE: 24
SUGGESTED CMS G CODE MODIFIER DAILY ACTIVITY: CH
SUGGESTED CMS G CODE MODIFIER MOBILITY: CH
DAILY ACTIVITIY SCORE: 24

## 2019-04-22 ASSESSMENT — LIFESTYLE VARIABLES
EVER HAD A DRINK FIRST THING IN THE MORNING TO STEADY YOUR NERVES TO GET RID OF A HANGOVER: NO
AVERAGE NUMBER OF DAYS PER WEEK YOU HAVE A DRINK CONTAINING ALCOHOL: 7
TOTAL SCORE: 0
EVER_SMOKED: NEVER
HAVE YOU EVER FELT YOU SHOULD CUT DOWN ON YOUR DRINKING: NO
ALCOHOL_USE: YES
TOTAL SCORE: 0
CONSUMPTION TOTAL: NEGATIVE
HAVE PEOPLE ANNOYED YOU BY CRITICIZING YOUR DRINKING: NO
TOTAL SCORE: 0
ON A TYPICAL DAY WHEN YOU DRINK ALCOHOL HOW MANY DRINKS DO YOU HAVE: 1
HOW MANY TIMES IN THE PAST YEAR HAVE YOU HAD 5 OR MORE DRINKS IN A DAY: 0
EVER FELT BAD OR GUILTY ABOUT YOUR DRINKING: NO

## 2019-04-22 ASSESSMENT — PAIN SCALES - GENERAL: PAIN_LEVEL: 0

## 2019-04-22 NOTE — ANESTHESIA PREPROCEDURE EVALUATION
Relevant Problems   (+) GERD (gastroesophageal reflux disease)   (+) S/P AVR (aortic valve replacement)   (+) S/P thoracic aortic aneurysm repair   (+) Severe mitral regurgitation       Physical Exam    Airway   Mallampati: II  TM distance: >3 FB  Neck ROM: full       Cardiovascular - normal exam  Rhythm: regular  Rate: normal  (-) murmur     Dental - normal exam         Pulmonary - normal exam  Breath sounds clear to auscultation     Abdominal    Neurological - normal exam                 Anesthesia Plan    ASA 4   ASA physical status 4 criteria: severe valve dysfunction    Plan - general       Airway plan will be ETT        Induction: intravenous    Postoperative Plan: Postoperative administration of opioids is intended.    Pertinent diagnostic labs and testing reviewed    Informed Consent:    Anesthetic plan and risks discussed with patient.    Use of blood products discussed with: patient whom consented to blood products.

## 2019-04-22 NOTE — OR NURSING
Pt to recovery, drowsy but otherwise appropriate. Denies pain or nausea in PACU. R femoral access site clean, dry and soft. R pedal pulse easily palpable. Ordered post op labs, EKG and CXR completed in PACU.  EKG reflecting ventricular paced rhythm. R radial arterial line dc'd. Pt educated regarding need for 4 hours flat bedrest ending at 1745. Verbalized understanding. VSS.  Pt's spouse updated to POC.

## 2019-04-22 NOTE — PROGRESS NOTES
Bedside report received. Patient A&O x4. 1L NC. Pt is paced on the monitor. No complaints of pain at this time. Rt femoral site is clean, dry, intact, and soft with strong pulse 2+.  POC discussed with patient. Patient verbalized understanding. Call light and belongings within reach. Bed locked and in lowest position, alarm and fall precautions in place.

## 2019-04-22 NOTE — ANESTHESIA PROCEDURE NOTES
Arterial Line  Performed by: JALEN ALONZO  Authorized by: JALEN ALONZO     Start Time:  4/22/2019 11:34 AM  End Time:  4/22/2019 11:37 AM  Localization: surface landmarks    Patient Location:  OR  Indication: continuous blood pressure monitoring and blood sampling needed    Catheter Size:  20 G  Seldinger Technique?: Yes    Site:  Radial artery  Line Secured:  Antimicrobial disc, tape and transparent dressing  Events: patient tolerated procedure well with no complications

## 2019-04-22 NOTE — ANESTHESIA TIME REPORT
Anesthesia Start and Stop Event Times     Date Time Event    4/22/2019 1129 Anesthesia Start     1346 Anesthesia Stop        Responsible Staff  04/22/19    Name Role Begin End    Brigido Samaniego M.D. Anesth 1129 1346        Preop Diagnosis (Free Text):  Pre-op Diagnosis     SEVERE MITRAL REGURGITATION        Preop Diagnosis (Codes):  Diagnosis Information     Diagnosis Code(s): Severe mitral regurgitation [I34.0]        Post op Diagnosis  Severe mitral regurgitation      Premium Reason  Non-Premium    Comments:

## 2019-04-22 NOTE — OP REPORT
DATE OF SERVICE:  04/22/2019    REFERRING PHYSICIAN:  Neto Ramirez MD    PREOPERATIVE DIAGNOSIS:  Severe mitral regurgitation, status post homograft   aortic valve replacement in 2000 and redo aortic valve replacement and   ascending aortic aneurysm repair in 2013, status post permanent pacemaker   placement.    POSTOPERATIVE DIAGNOSIS:  Severe mitral regurgitation, status post homograft   aortic valve replacement in 2000 and redo aortic valve replacement and   ascending aortic aneurysm repair in 2013, status post permanent pacemaker   placement.    PROCEDURES:  Transcatheter mitral valve repair (MitraClip XTR x1), transseptal   puncture and intraoperative transesophageal echocardiography.    SURGEONS:  Neto Ramirez MD, and Yehuda Morgan MD    ANESTHESIOLOGIST:  Brigido Samaniego MD    ANESTHESIA:  General endotracheal.    ESTIMATED BLOOD LOSS:  Minimal.    COMPLICATIONS:  None.    INDICATIONS:  The patient is an 79-year-old white male who has had 2   operations for aortic valve replacement.  He presented complaining of   worsening shortness of breath and fatigue.  Echocardiography showed severe   mitral regurgitation and a myxomatous mitral valve.    DESCRIPTION OF PROCEDURE:  The patient was brought to the operating room and   placed on the operating room table in the supine position.  After successful   induction of general anesthesia and endotracheal intubation, the patient was   prepped and draped in the usual sterile fashion.  In collaboration with Dr. Ramirez, right femoral ultrasound-guided venous access was obtained.  Dr. Ramirez advanced the wire in the superior vena cava.  A preclose suture was   deployed.  Dr. Ramirez then proceeded to perform a transseptal puncture under   fluoroscopic and transesophageal echocardiographic guidance.  A   superior-posterior puncture was performed in the interatrial septum   approximately 5 cm away from the mitral valve.  The guide paddle was then   advanced over a wire  into the left atrium.  Dr. Ramirez then placed the clip   delivery catheter through the guide paddle into the left atrium.  He   subsequently manipulated the clip delivery handle controls and I manipulated   the steerable guide paddle in the anteroposterior direction and moved the   whole system medially or laterally to achieve a good position of the clip over   the larger jet, which was in the A3/P3 region.  Dr. Ramirez advanced the clip   into the left ventricle and grasped the mitral valve prolapsing A3 and P3   scallops.  This greatly reduced the mitral regurgitation, which went from 4+   to 1+.  We felt that this was a good position of the clip and the clip was   relieved.  The guide peddle and delivery clip system were then removed.  The   preclose suture was tightened down and Dermabond was applied over the small   right groin incision.  The remainder of the operation will be dictated by Dr. Ramirez.  There were no apparent complications.  The patient tolerated the   procedure well and left the operating room in stable condition.       ____________________________________     Yehuda STOUT    DD:  04/22/2019 14:01:32  DT:  04/22/2019 14:45:29    D#:  [id number]  Job#:  [id number]

## 2019-04-22 NOTE — OP REPORT
DATE OF PROCEDURE: 04/22/19    REFERRING PHYSICIAN: Rashard Martin    PROCEDURES:  1. Transcatheter mitral valve repair (MitraClip) with 1 clip.  2. Transseptal puncture.  3. Ultrasound guided femoral vein access.  4. PerClose closure.    PRE PROCEDURAL DIAGNOSIS:  1. Severe symptomatic mitral regurgitation, NYHA II.    POST PROCEDURAL DIAGNOSIS:  1. Successful transcatheter percutaneous mitral valve repair (MitraClip) with 1clip, under general anesthesia.  2. Successful Preclose closure.    INDICATION:    79 year old male with complex cardiovascular history including severe mitral regurgitation, aortic valve replacement, ascending aortic aneurysm repair. Due to his symptoms he was scheduled for MitraClip.    DESCRIPTION OF PROCEDURE:  After informed consent was signed by the   patient, the patient was brought into the OR 19.  He was prepped and draped in   usual sterile manner.  Prior to the procedure, right radial arterial insertion, intubation   and transesophageal echocardiogram were performed by Brigido Perez.    The initial timeout was performed.     A 6-Anguillan arterial sheath was placed in the right femoral artery by Modified Seldinger   Technique under ultrasound guidance by myself. A PerClose devices was delivered.   An 8.5-Anguillan TorFlex arterial sheath was advanced. IV heparin was given. A [place]   needle was inserted into the catheter and both the needle and the sheath were placed in   the right atrium. Under transesophageal echocardiogram guidance, the [place] needle   was used to puncture the septum and the catheter was advanced into the left atrium by   myself. IV Heparin was readjusted. A Protrack pigtail wire was positioned into the left   atrium. The sheath was removed and the femoral incision site was dilated with multiple   dialators. The 22/24-Anguillan steerable guide catheter handle was inserted into   the left atrium under transesophageal echocardiogram guidance and the dilator was    removed. Hemodynamics were obtained. The Clip attached to the delivery catheter   handle was inserted in to the left atrium. Again, under transesophageal echocardiogram   guidance, the clip was opened, advanced into the left ventricle and pulled up to the   mitral valve leaflets at the A3/P2 position. The leaflets were grasped and the clip was   partially closed. The severity of mitral regurgitation and the mitral valve gradient was   measured. The clip was then closed fully and released by myself. The delivery catheter   was removed.    The mitral regurgitation was reduced to mild.    The patient tolerated the procedure well. At the end of procedure hemodynamics were   again obtained. The steerable guide catheter was removed and the right femoral venous   access site was closed via PerClose closure.  The patient was then extubated and transferred to PACU in stable condition.    The entire procedure was performed with collaboration with Yehuda Conte.    IMPRESSION:  1. Successful transcatheter percutaneous mitral valve repair (MitraClip) with 1clip, under general anesthesia.  2. Successful Preclose closure.    RECOMMENDATION: Medical therapy with continuation of aspirin.

## 2019-04-22 NOTE — H&P
Physician H&P    Patient ID:  Ross Mcneal  [id number]  79 y.o. male  1940    History:  Primary Diagnosis: Outpatient MitraClip    HPI:  79 year old male with complex cardiovascular history including severe mitral regurgitation, aortic valve replacement, ascending aortic aneurysm repair. Due to his symptoms he was scheduled for MitraClip.    Past Medical History:  has a past medical history of Arthritis; Basal cell carcinoma of lip (2013); Biventricular cardiac pacemaker in situ (3/4/2015); Breath shortness; Cancer ([facility]); GERD (gastroesophageal reflux disease) (10/17/2013); Heart burn; Hypertension ( ); Indigestion; Insomnia (10/17/2013); LBBB (left bundle branch block) ( ); MEDICAL HOME; Pacemaker; Prostate cancer (Former[facility]) (); PVCs (premature ventricular contractions) (3/4/2015); S/P AVR (aortic valve replacement) (2013); S/P prostatectomy (); S/P thoracic aortic aneurysm repair (2013); Seasonal allergies (10/17/2013); and Valvular heart disease.  Past Surgical History:  has a past surgical history that includes other cardiac surgery ( and ); prostatectomy, radial (.); appendectomy; aortic valve replacement (13); zzz cardiac cath (2013); other (); and recovery (2014).  Past Social History:  reports that he has never smoked. He has never used smokeless tobacco. He reports that he drinks alcohol. He reports that he does not use drugs.  Past Family History:   Family History   Problem Relation Age of Onset   • Other Mother          at age 82. Parkinson's disease.   • Cancer Father          at age 68. Prostate cancer.   • Cancer Sister         Lung   • Heart Disease Neg Hx      Allergies: Patient has no known allergies.    Medications reviewed.    Current Medications:  Prior to Admission medications    Medication Sig Start Date End Date Taking? Authorizing Provider   fluticasone (FLONASE) 50 MCG/ACT nasal spray Spray 2 Sprays in nose 1 time daily as  "needed.   Yes Physician Outpatient   metoprolol SR (TOPROL XL) 25 MG TABLET SR 24 HR TAKE 1 TABLET BY MOUTH 1/2  HOUR AFTER DINNER AS  DIRECTED 10/15/18   Scottie Packer M.D.   omeprazole (PRILOSEC) 20 MG delayed-release capsule Take 1 Cap by mouth every day. 6/6/18   Scottie Packer M.D.   losartan (COZAAR) 50 MG Tab Take 1 Tab by mouth every day. 6/6/18   Scottie Packer M.D.   Cholecalciferol (VITAMIN D) 400 UNIT TABS Take 400 Units by mouth every day.    Physician Outpatient   DiphenhydrAMINE HCl (BENADRYL ALLERGY PO) Take 1 Each by mouth at bedtime as needed. As needed for sleep     Physician Outpatient   aspirin 81 MG tablet Take 81 mg by mouth every evening.    Physician Outpatient       Review of Systems:  Review of Systems   Constitutional: Positive for malaise/fatigue. Negative for chills, fever and weight loss.   HENT: Negative.  Negative for hearing loss.    Eyes: Negative.  Negative for blurred vision and double vision.   Respiratory: Positive for shortness of breath. Negative for cough.    Cardiovascular: Negative.  Negative for chest pain, palpitations, claudication and leg swelling.   Gastrointestinal: Negative.  Negative for abdominal pain, nausea and vomiting.   Genitourinary: Negative.  Negative for dysuria and urgency.   Musculoskeletal: Negative.  Negative for joint pain and myalgias.   Skin: Negative.  Negative for itching and rash.   Neurological: Positive for weakness. Negative for dizziness, focal weakness and headaches.   Endo/Heme/Allergies: Negative.  Does not bruise/bleed easily.   Psychiatric/Behavioral: Negative.  Negative for depression. The patient is not nervous/anxious.      /88   Pulse 73   Temp 36.3 °C (97.4 °F) (Temporal)   Resp 16   Ht 1.88 m (6' 2\")   Wt 86.4 kg (190 lb 7.6 oz)   SpO2 96%     Physical Examination:  Physical Exam   Constitutional: He is oriented to person, place, and time. He appears well-developed and well-nourished.   HENT:   Head: Normocephalic and " atraumatic.   Eyes: Pupils are equal, round, and reactive to light. Conjunctivae are normal.   Neck: Normal range of motion. Neck supple.   Cardiovascular: Normal rate and regular rhythm.    Murmur heard.  Pulmonary/Chest: Effort normal and breath sounds normal.   Abdominal: Soft. Bowel sounds are normal.   Musculoskeletal: Normal range of motion. He exhibits no edema.   Neurological: He is alert and oriented to person, place, and time.   Skin: Skin is warm and dry.   Psychiatric: He has a normal mood and affect.     CARDIAC STUDIES/PROCEDURES:     CARDIAC CATHETERIZATION CONCLUSIONS by Dr. Conte (03/12/19)  1.  Severe mitral regurgitation.  2.  Left ventricular ejection fraction 61% with normal wall motion.  3.  Coronary arteries are angiographically normal.  4.  Normal pulmonary pressures.  5.  Elevated bioprosthetic aortic valve pullback gradient.  6.  Elevated LVEDP.     ECHOCARDIOGRAM CONCLUSIONS (10/17/18)  Normal left ventricular size and systolic function.  Left ventricular ejection fraction is visually estimated to be 65%.  Mild concentric left ventricular hypertrophy.  Known bioprosthetic aortic valve that is functioning normally with   normal transvalvular gradients.  Myxomatous mitral valve disease.  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  Severe mitral regurgitation.  Pacer/ICD wire seen in right ventricle.     EKG performed on (03/11/19) EKG shows paced rhythm.     Laboratory results of (04/11/19) were reviewed. Bun of 24 mg/dl, creatinine levels of 1.07 mg/dl noted.     TRANSESOPHAGEAL ECHOCARDIOGRAM CONCLUSIONS by Dr. Conte (02/12/19)  The left ventricle was normal in size and function.  Left ventricular ejection fraction is visually estimated to be 70%.  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  Severe mitral regurgitation.  Known bioprosthetic aortic valve that is functioning normally.    Impression/Plan:    1. Severe  mitral regurgitation: Severe mitral regurgitation was noted on his echocardiogram and he is symptomatic, NYHA class II. He is not a candidate for mitral valve repair per Dr. Morgan with STS score of 10%.  We will prceed with MitraClip. He understands the risks and benefits and agrees with plan. He does not want open heart surgery in the event that complications were to happen. ([place] mitral review 3/19/19): Bi-leaflet prolapse. Significant prolapse at A3/P3. Posteriorly directed eccentric jet. Leaflet length adequate to grasp. Valve leaflets appear clipable. Recommend XTr clip. Multiple clips likely required.)  2. History of surgery for bicuspid aortic valve with homograft valve at [facility] in 2000 and redo aortic valve replacement and ascending aortic aneurysm repair with #23 mm bioprosthetic valve and 28 mm tube graft at [facility] in 02/21/2013.  3. [employer] pacemaker placement by Jam Escobar (12/29/14): The pacemake is functioning well.  We will continue with device interrogation.    The risks, benefits, and alternatives to MitraClip, general anesthesia and transesophageal echocardiogram were discussed in great detail. Specific risks mentioned include bleeding, infection, kidney damage, allergic reaction, cardiac perforation with possible tamponade requiring thalia-cardiocentesis or possible open heart surgery if the patient is a candidate. Lastly the risks of heart attack, stroke, and death were discussed; the risks of major complications including  all cause mortality of 2.2%, stroke 0.9%, major bleeding complication is 7.4, pericardial tamponade 1.9%, clip specific complications (embolization 0%, partial clip detachment was 1.9%). The patient verbalized understanding of these potential complications and wishes to proceed with this procedure. (TRAMI registry 2015).  The procedure will be performed completely in collaboration with cardiac surgery.     CC Rashard Martin  and Scottie Chang    Pre Procedure Assessment:  <EXAMSHORT2>      Pre Procedure update:   No changes.    Neto Ramirez  4/22/2019

## 2019-04-22 NOTE — ANESTHESIA PROCEDURE NOTES
Airway  Date/Time: 4/22/2019 11:36 AM  Performed by: JALEN ALONZO  Authorized by: JALEN ALONZO     Location:  OR  Urgency:  Elective  Indications for Airway Management:  Anesthesia  Spontaneous Ventilation: absent    Sedation Level:  Deep  Preoxygenated: Yes    Patient Position:  Sniffing  Final Airway Type:  Endotracheal airway  Final Endotracheal Airway:  ETT  Cuffed: Yes    Technique Used for Successful ETT Placement:  Direct laryngoscopy  Insertion Site:  Oral  Blade Type:  Porfirio  Laryngoscope Blade/Videolaryngoscope Blade Size:  3  ETT Size (mm):  7.0  Measured from:  Teeth  ETT to Teeth (cm):  22  Placement Verified by: auscultation and capnometry    Cormack-Lehane Classification:  Grade IIa - partial view of glottis  Number of Attempts at Approach:  1

## 2019-04-22 NOTE — OR SURGEON
Immediate Post OP Note    PreOp Diagnosis: Severe mitral regurgitation    PostOp Diagnosis: Same    Procedure(s):  REPAIR, MITRAL VALVE, TRANSCATHETER (MitraClip XTR x1)  TRANSSEPTAL PUNCTURE  ECHOCARDIOGRAM, TRANSESOPHAGEAL    Surgeon(s):  GENE Miller M.D.    Anesthesiologist/Type of Anesthesia:  Anesthesiologist: Brigido Samaniego M.D./General    Surgical Staff:  Circulator: Jacobo Gilman R.N.; Praveen Gutierrez R.N.  Perfusionist: Jamal Sanz  Scrub Person: Hector Cardenas  Sedation/Monitoring Nurse: Tyroen Miramontes R.N.  Radiology Technologist: Irving Harper    Specimens removed if any:nONE    Estimated Blood Loss: Minimal    Findings: Severe mitral regurgitation    Complications: None        4/22/2019 1:39 PM Yehuda Morgan M.D.

## 2019-04-22 NOTE — PROGRESS NOTES
Cardiology Follow Up Progress Note    Date of Service  4/22/2019    Attending Physician  Neto Ramirez M.D.    Chief Complaint   Status post MitraClip    HPI  Ross Mcneal is a 79 y.o. male admitted 4/22/2019 with above.    Interim Events  Doing well status post MitraClip.    Review of Systems  Review of Systems   Constitutional: Negative.  Negative for chills and fever.   HENT: Negative.  Negative for hearing loss.    Eyes: Negative.    Respiratory: Negative.  Negative for cough and shortness of breath.    Cardiovascular: Negative.  Negative for chest pain, palpitations and leg swelling.   Gastrointestinal: Negative.  Negative for abdominal pain, nausea and vomiting.   Genitourinary: Negative.  Negative for dysuria and urgency.   Musculoskeletal: Negative.  Negative for myalgias.   Skin: Negative.  Negative for rash.   Neurological: Negative.  Negative for dizziness, weakness and headaches.   Hematological: Does not bruise/bleed easily.   Psychiatric/Behavioral: Negative.  The patient is not nervous/anxious.        Vital signs in last 24 hours  Temp:  [36.3 °C (97.4 °F)] 36.3 °C (97.4 °F)  Pulse:  [73] 73  Resp:  [16] 16  BP: (153)/(88) 153/88  SpO2:  [96 %] 96 %    Physical Exam  Physical Exam   Constitutional: He is oriented to person, place, and time. He appears well-developed and well-nourished.   HENT:   Head: Normocephalic and atraumatic.   Eyes: Pupils are equal, round, and reactive to light. Conjunctivae are normal.   Neck: Normal range of motion. Neck supple.   Cardiovascular: Normal rate and regular rhythm.    Pulmonary/Chest: Effort normal and breath sounds normal.   Abdominal: Soft. Bowel sounds are normal.   Musculoskeletal: Normal range of motion. He exhibits no edema.   Cath site is within normal limits.   Neurological: He is alert and oriented to person, place, and time.   Skin: Skin is warm and dry.   Psychiatric: He has a normal mood and affect.       Lab Review  Lab Results   Component  Value Date/Time    WBC 5.5 04/11/2019 01:44 PM    RBC 4.70 04/11/2019 01:44 PM    HEMOGLOBIN 14.2 04/11/2019 01:44 PM    HEMATOCRIT 43.9 04/11/2019 01:44 PM    MCV 93.4 04/11/2019 01:44 PM    MCH 30.2 04/11/2019 01:44 PM    MCHC 32.3 (L) 04/11/2019 01:44 PM    MPV 12.2 04/11/2019 01:44 PM      Lab Results   Component Value Date/Time    SODIUM 141 04/11/2019 01:44 PM    POTASSIUM 3.9 04/11/2019 01:44 PM    CHLORIDE 112 04/11/2019 01:44 PM    CO2 24 04/11/2019 01:44 PM    GLUCOSE 106 (H) 04/11/2019 01:44 PM    BUN 24 (H) 04/11/2019 01:44 PM    CREATININE 1.07 04/11/2019 01:44 PM    BUNCREATRAT 13 02/01/2019 07:31 AM      Lab Results   Component Value Date/Time    ASTSGOT 18 03/11/2019 01:48 PM    ALTSGPT 17 03/11/2019 01:48 PM     Lab Results   Component Value Date/Time    CHOLSTRLTOT 185 02/01/2019 07:31 AM    LDL 97 02/01/2019 07:31 AM    HDL 62 02/01/2019 07:31 AM    TRIGLYCERIDE 130 02/01/2019 07:31 AM    Medications reviewed.      Current Facility-Administered Medications:   •  NS infusion, , Intravenous, Continuous, Neto Ramirez M.D.  •  lidocaine (XYLOCAINE) 1 % injection 0.5 mL, 0.5 mL, Intradermal, Once PRN, Neto Ramirez M.D.           Cardiac Imaging and Procedures Review  CARDIAC STUDIES/PROCEDURES:     CARDIAC CATHETERIZATION CONCLUSIONS by Dr. Conte (03/12/19)  1.  Severe mitral regurgitation.  2.  Left ventricular ejection fraction 61% with normal wall motion.  3.  Coronary arteries are angiographically normal.  4.  Normal pulmonary pressures.  5.  Elevated bioprosthetic aortic valve pullback gradient.  6.  Elevated LVEDP.     ECHOCARDIOGRAM CONCLUSIONS (10/17/18)  Normal left ventricular size and systolic function.  Left ventricular ejection fraction is visually estimated to be 65%.  Mild concentric left ventricular hypertrophy.  Known bioprosthetic aortic valve that is functioning normally with   normal transvalvular gradients.  Myxomatous mitral valve disease.  Prolapse of the anterior and  posterior mitral leaflets was present.  Severe mitral regurgitation.  Pacer/ICD wire seen in right ventricle.     EKG performed on (03/11/19) EKG shows paced rhythm.     Laboratory results of (04/11/19) were reviewed. Bun of 24 mg/dl, creatinine levels of 1.07 mg/dl noted.     TRANSESOPHAGEAL ECHOCARDIOGRAM CONCLUSIONS by Dr. Conte (02/12/19)  The left ventricle was normal in size and function.  Left ventricular ejection fraction is visually estimated to be 70%.  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  Severe mitral regurgitation.  Known bioprosthetic aortic valve that is functioning normally.     TRANSESOPHAGEAL ECHOCARDIOGRAM CONCLUSIONS by Dr. Samaniego (04/22/19)  Results pending.  (study result reviewed)    Assessment/Plan  1. Successful transcatheter percutaneous mitral valve repair (MitraClip) with 1clip, under general anesthesia (04/22/19):   2. History of surgery for bicuspid aortic valve with homograft valve at [facility] in 2000 and redo aortic valve replacement and ascending aortic aneurysm repair with #23 mm bioprosthetic valve and 28 mm tube graft at [facility] in 02/21/2013.  3. [employer] pacemaker placement by Jam Escobar (12/29/14): The pacemake is functioning well.  We will continue with device interrogation.    CC Rashard Martin and Scottie Chang    Thank you for allowing me to participate in the care of this patient.  I will continue to follow this patient    Please contact me with any questions.    Neto Ramirez M.D.   Cardiologist, [facility]  [phone]

## 2019-04-23 ENCOUNTER — APPOINTMENT (OUTPATIENT)
Dept: CARDIOLOGY | Facility: MEDICAL CENTER | Age: 79
DRG: 229 | End: 2019-04-23
Attending: NURSE PRACTITIONER
Payer: MEDICARE

## 2019-04-23 VITALS
OXYGEN SATURATION: 93 % | HEART RATE: 75 BPM | DIASTOLIC BLOOD PRESSURE: 72 MMHG | WEIGHT: 191.36 LBS | TEMPERATURE: 97.7 F | RESPIRATION RATE: 17 BRPM | HEIGHT: 74 IN | SYSTOLIC BLOOD PRESSURE: 133 MMHG | BODY MASS INDEX: 24.56 KG/M2

## 2019-04-23 PROBLEM — Z98.890 S/P MITRAL VALVE CLIP IMPLANTATION: Status: ACTIVE | Noted: 2017-03-16

## 2019-04-23 PROBLEM — Z95.818 S/P MITRAL VALVE CLIP IMPLANTATION: Status: ACTIVE | Noted: 2017-03-16

## 2019-04-23 LAB
ANION GAP SERPL CALC-SCNC: 6 MMOL/L (ref 0–11.9)
BUN SERPL-MCNC: 26 MG/DL (ref 8–22)
CALCIUM SERPL-MCNC: 9 MG/DL (ref 8.5–10.5)
CHLORIDE SERPL-SCNC: 108 MMOL/L (ref 96–112)
CO2 SERPL-SCNC: 22 MMOL/L (ref 20–33)
CREAT SERPL-MCNC: 1.05 MG/DL (ref 0.5–1.4)
EKG IMPRESSION: NORMAL
ERYTHROCYTE [DISTWIDTH] IN BLOOD BY AUTOMATED COUNT: 45.8 FL (ref 35.9–50)
GLUCOSE SERPL-MCNC: 116 MG/DL (ref 65–99)
HCT VFR BLD AUTO: 39.4 % (ref 42–52)
HGB BLD-MCNC: 13.1 G/DL (ref 14–18)
MCH RBC QN AUTO: 30.4 PG (ref 27–33)
MCHC RBC AUTO-ENTMCNC: 33.2 G/DL (ref 33.7–35.3)
MCV RBC AUTO: 91.4 FL (ref 81.4–97.8)
PLATELET # BLD AUTO: 107 K/UL (ref 164–446)
PMV BLD AUTO: 12.4 FL (ref 9–12.9)
POTASSIUM SERPL-SCNC: 4.2 MMOL/L (ref 3.6–5.5)
RBC # BLD AUTO: 4.31 M/UL (ref 4.7–6.1)
SODIUM SERPL-SCNC: 136 MMOL/L (ref 135–145)
WBC # BLD AUTO: 7.5 K/UL (ref 4.8–10.8)

## 2019-04-23 PROCEDURE — 700102 HCHG RX REV CODE 250 W/ 637 OVERRIDE(OP): Performed by: NURSE PRACTITIONER

## 2019-04-23 PROCEDURE — 80048 BASIC METABOLIC PNL TOTAL CA: CPT

## 2019-04-23 PROCEDURE — 99024 POSTOP FOLLOW-UP VISIT: CPT | Performed by: INTERNAL MEDICINE

## 2019-04-23 PROCEDURE — 85027 COMPLETE CBC AUTOMATED: CPT

## 2019-04-23 PROCEDURE — 93005 ELECTROCARDIOGRAM TRACING: CPT | Performed by: NURSE PRACTITIONER

## 2019-04-23 PROCEDURE — A9270 NON-COVERED ITEM OR SERVICE: HCPCS | Performed by: NURSE PRACTITIONER

## 2019-04-23 PROCEDURE — 93010 ELECTROCARDIOGRAM REPORT: CPT | Performed by: INTERNAL MEDICINE

## 2019-04-23 PROCEDURE — 93306 TTE W/DOPPLER COMPLETE: CPT | Mod: 26 | Performed by: INTERNAL MEDICINE

## 2019-04-23 PROCEDURE — 93306 TTE W/DOPPLER COMPLETE: CPT

## 2019-04-23 PROCEDURE — 36415 COLL VENOUS BLD VENIPUNCTURE: CPT

## 2019-04-23 PROCEDURE — 97161 PT EVAL LOW COMPLEX 20 MIN: CPT

## 2019-04-23 RX ADMIN — OMEPRAZOLE 20 MG: 20 CAPSULE, DELAYED RELEASE ORAL at 05:13

## 2019-04-23 RX ADMIN — LOSARTAN POTASSIUM 50 MG: 50 TABLET ORAL at 05:13

## 2019-04-23 RX ADMIN — CHOLECALCIFEROL (VITAMIN D3) 10 MCG (400 UNIT) TABLET 400 UNITS: at 05:13

## 2019-04-23 ASSESSMENT — ENCOUNTER SYMPTOMS
NEUROLOGICAL NEGATIVE: 1
BRUISES/BLEEDS EASILY: 0
HEADACHES: 0
SHORTNESS OF BREATH: 0
DIZZINESS: 0
ABDOMINAL PAIN: 0
WEAKNESS: 0
VOMITING: 0
FEVER: 0
CHILLS: 0
PALPITATIONS: 0
MUSCULOSKELETAL NEGATIVE: 1
EYES NEGATIVE: 1
CONSTITUTIONAL NEGATIVE: 1
NERVOUS/ANXIOUS: 0
MYALGIAS: 0
GASTROINTESTINAL NEGATIVE: 1
COUGH: 0
RESPIRATORY NEGATIVE: 1
PSYCHIATRIC NEGATIVE: 1
NAUSEA: 0
CARDIOVASCULAR NEGATIVE: 1

## 2019-04-23 ASSESSMENT — COGNITIVE AND FUNCTIONAL STATUS - GENERAL
MOBILITY SCORE: 24
SUGGESTED CMS G CODE MODIFIER MOBILITY: CH

## 2019-04-23 ASSESSMENT — GAIT ASSESSMENTS
GAIT LEVEL OF ASSIST: INDEPENDENT
DISTANCE (FEET): 500

## 2019-04-23 NOTE — PROGRESS NOTES
2 RN skin check completed with Jaquan GARG.     Elbows pink and blanching  Heels dry, intact  Minimal bruising noted on right groin cath site, soft, dry.

## 2019-04-23 NOTE — PROGRESS NOTES
Bedside report received. Patient A&O x4. RA. Paced on the monitor. No complaints of pain at this time. Slight bruising to right groin site but site is soft, Dr. Ramirez aware.  POC discussed with patient. Patient verbalized understanding. Call light and belongings within reach. Bed locked and in lowest position, alarm and fall precautions in place.

## 2019-04-23 NOTE — PROGRESS NOTES
Received report at 1900 at bedside. Pt A&O x 4 no C/O pain. Completed assessment. POC discussed with patient at bedside. Bed locked and low. Belongings and call light within reach.

## 2019-04-23 NOTE — DISCHARGE PLANNING
Care Transition Team Assessment    LSW met with pt and pt's spouse to complete assessment. Pt is independent with all IADLs/ADLs at baseline, he does not use any DME. Pt confirmed he is able to afford any medication co-pays/medical appointments. Pt drives himself or his wife drives, his wife is his primary support but they have extensive family/friend support if they need it. Pt is not anticipated to have any needs at time of d/c.     Information Source  Orientation : Oriented x 4  Information Given By: Patient  Informant's Name: Ross Mcneal  Who is responsible for making decisions for patient? : Patient    Elopement Risk  Legal Hold: No  Ambulatory or Self Mobile in Wheelchair: Yes  Disoriented: No  Psychiatric Symptoms: None  History of Wandering: No  Elopement this Admit: No  Vocalizing Wanting to Leave: No  Displays Behaviors, Body Language Wanting to Leave: No-Not at Risk for Elopement  Elopement Risk: Not at Risk for Elopement    Interdisciplinary Discharge Planning  Patient or legal guardian wants to designate a caregiver (see row info): No    Discharge Preparedness  What is your plan after discharge?: Home with help  What are your discharge supports?: Child, Spouse, Other (comment) (Pt reports having extensive support. )  Prior Functional Level: Ambulatory, Drives Self, Independent with Activities of Daily Living, Independent with Medication Management  Difficulity with ADLs: None  Difficulity with IADLs: None    Functional Assesment  Prior Functional Level: Ambulatory, Drives Self, Independent with Activities of Daily Living, Independent with Medication Management    Finances  Financial Barriers to Discharge: No  Prescription Coverage: Yes    Vision / Hearing Impairment  Vision Impairment : Yes  Right Eye Vision: Impaired, Wears Glasses  Left Eye Vision: Impaired, Wears Glasses  Hearing Impairment : No    Values / Beliefs / Concerns  Spiritual Requests During Hospitalization: No    Advance  Directive  Advance Directive?: DPOA for Health Care  Durable Power of  Name and Contact : Anita Browne    Domestic Abuse  Physical Abuse or Sexual Abuse: No  Verbal Abuse or Emotional Abuse: No  Possible Abuse Reported to: Not Applicable    Anticipated Discharge Information  Anticipated discharge disposition: Home  Discharge Address: [facility] [address], [place] [address]  Discharge Contact Phone Number: [phone]

## 2019-04-23 NOTE — PROGRESS NOTES
Pt has discharge orders. Pt educated on discharge instructions and new prescriptions.  Pt provided with mitral clip card and educated to keep in wallet at all times.  Pt educated to take is easy for 2 weeks and limit lifting of 10 pounds or greater during that time.  Pt verbalizes understanding.  Follow up appointment made with cardiology office on Friday, 4/26/19. PIV removed, monitor checked in.  Pt going home with wife. RN to call transport for escort out, will monitor pt until off unit.

## 2019-04-23 NOTE — PROGRESS NOTES
Cardiology Follow Up Progress Note    Date of Service  4/23/2019    Attending Physician  Neto Ramirez M.D.    Chief Complaint   Status post MitraClip    HPI  Ross Mcneal is a 79 y.o. male admitted 4/22/2019 with above.    Interim Events  No significant changes noted from cardiac standpoint within the past 24 hours.    Review of Systems  Review of Systems   Constitutional: Negative.  Negative for chills and fever.   HENT: Negative.  Negative for hearing loss.    Eyes: Negative.    Respiratory: Negative.  Negative for cough and shortness of breath.    Cardiovascular: Negative.  Negative for chest pain, palpitations and leg swelling.   Gastrointestinal: Negative.  Negative for abdominal pain, nausea and vomiting.   Genitourinary: Negative.  Negative for dysuria and urgency.   Musculoskeletal: Negative.  Negative for myalgias.   Skin: Negative.  Negative for rash.   Neurological: Negative.  Negative for dizziness, weakness and headaches.   Hematological: Does not bruise/bleed easily.   Psychiatric/Behavioral: Negative.  The patient is not nervous/anxious.        Vital signs in last 24 hours  Temp:  [36.1 °C (97 °F)-37.1 °C (98.8 °F)] 36.7 °C (98 °F)  Pulse:  [65-88] 85  Resp:  [12-24] 16  BP: (114-153)/(65-88) 115/74  SpO2:  [92 %-99 %] 93 %    Physical Exam  Physical Exam   Constitutional: He is oriented to person, place, and time. He appears well-developed and well-nourished.   HENT:   Head: Normocephalic and atraumatic.   Eyes: Pupils are equal, round, and reactive to light. Conjunctivae are normal.   Neck: Normal range of motion. Neck supple.   Cardiovascular: Normal rate and regular rhythm.    Murmur heard.  Pulmonary/Chest: Effort normal and breath sounds normal.   Abdominal: Soft. Bowel sounds are normal.   Musculoskeletal: Normal range of motion. He exhibits no edema.   Cath site is within normal limits.   Neurological: He is alert and oriented to person, place, and time.   Skin: Skin is warm and dry.    Psychiatric: He has a normal mood and affect.       Lab Review  Lab Results   Component Value Date/Time    WBC 7.5 04/23/2019 02:30 AM    RBC 4.31 (L) 04/23/2019 02:30 AM    HEMOGLOBIN 13.1 (L) 04/23/2019 02:30 AM    HEMATOCRIT 39.4 (L) 04/23/2019 02:30 AM    MCV 91.4 04/23/2019 02:30 AM    MCH 30.4 04/23/2019 02:30 AM    MCHC 33.2 (L) 04/23/2019 02:30 AM    MPV 12.4 04/23/2019 02:30 AM      Lab Results   Component Value Date/Time    SODIUM 136 04/23/2019 02:30 AM    POTASSIUM 4.2 04/23/2019 02:30 AM    CHLORIDE 108 04/23/2019 02:30 AM    CO2 22 04/23/2019 02:30 AM    GLUCOSE 116 (H) 04/23/2019 02:30 AM    BUN 26 (H) 04/23/2019 02:30 AM    CREATININE 1.05 04/23/2019 02:30 AM    BUNCREATRAT 13 02/01/2019 07:31 AM      Lab Results   Component Value Date/Time    ASTSGOT 18 03/11/2019 01:48 PM    ALTSGPT 17 03/11/2019 01:48 PM     Lab Results   Component Value Date/Time    CHOLSTRLTOT 185 02/01/2019 07:31 AM    LDL 97 02/01/2019 07:31 AM    HDL 62 02/01/2019 07:31 AM    TRIGLYCERIDE 130 02/01/2019 07:31 AM    Medications reviewed.      Current Facility-Administered Medications:   •  aspirin (ASA) chewable tab 81 mg, 81 mg, Oral, Q EVENING, Sakshi Miramontes, A.P.R.N., 81 mg at 04/22/19 1811  •  cholecalciferol (VITAMIN D3) tablet 400 Units, 400 Units, Oral, DAILY, Sakshi Miramontes, A.P.R.N., 400 Units at 04/23/19 0513  •  diphenhydrAMINE (BENADRYL) tablet/capsule 25 mg, 25 mg, Oral, QHS PRN, Sakshi Miramontes, A.P.R.N.  •  fluticasone (FLONASE) nasal spray 100 mcg, 2 Spray, Nasal, QDAY PRN, Sakshi A Kulwinder, A.P.R.N.  •  losartan (COZAAR) tablet 50 mg, 50 mg, Oral, DAILY, Sakshi Miramontes, A.P.R.N., 50 mg at 04/23/19 0513  •  metoprolol SR (TOPROL XL) tablet 12.5 mg, 12.5 mg, Oral, Q EVENING, Sakshi Miramontes A.P.R.N., 12.5 mg at 04/22/19 1811  •  omeprazole (PRILOSEC) capsule 20 mg, 20 mg, Oral, DAILY, Sakshi Miramontes, A.P.R.N., 20 mg at 04/23/19 0513  •  ondansetron (ZOFRAN) syringe/vial  injection 4 mg, 4 mg, Intravenous, Q4HRS PRN, Sakshi Miramontes, A.P.R.N.  •  diphenhydrAMINE (BENADRYL) injection 25 mg, 25 mg, Intravenous, Q6HRS PRN, Sakshi Miramontes, A.P.R.N.  •  acetaminophen (TYLENOL) tablet 650 mg, 650 mg, Oral, Q6HRS PRN, Sakshi Miramontes, A.P.R.N.  •  diphenhydrAMINE (BENADRYL) tablet/capsule 25 mg, 25 mg, Oral, HS PRN, Sakshi Miramontes, A.P.R.N.  •  hydrALAZINE (APRESOLINE) injection 10 mg, 10 mg, Intravenous, Q30 MIN PRN, Sakshi Miramontes, A.P.R.N.  •  senna-docusate (PERICOLACE or SENOKOT S) 8.6-50 MG per tablet 2 Tab, 2 Tab, Oral, BID PRN **AND** polyethylene glycol/lytes (MIRALAX) PACKET 1 Packet, 1 Packet, Oral, QDAY PRN **AND** magnesium hydroxide (MILK OF MAGNESIA) suspension 30 mL, 30 mL, Oral, QDAY PRN **AND** bisacodyl (DULCOLAX) suppository 10 mg, 10 mg, Rectal, QDAY PRN, Sakshi Miramontes, A.P.R.N.     Recent Labs      04/22/19   1400   BNPBTYPENAT  63       Cardiac Imaging and Procedures Review  CARDIAC STUDIES/PROCEDURES:     CARDIAC CATHETERIZATION CONCLUSIONS by Dr. Conte (03/12/19)  1.  Severe mitral regurgitation.  2.  Left ventricular ejection fraction 61% with normal wall motion.  3.  Coronary arteries are angiographically normal.  4.  Normal pulmonary pressures.  5.  Elevated bioprosthetic aortic valve pullback gradient.  6.  Elevated LVEDP.    ECHOCARDIOGRAM CONCLUSIONS (04/23/19)  Results pending.  (study result reviewed)     ECHOCARDIOGRAM CONCLUSIONS (10/17/18)  Normal left ventricular size and systolic function.  Left ventricular ejection fraction is visually estimated to be 65%.  Mild concentric left ventricular hypertrophy.  Known bioprosthetic aortic valve that is functioning normally with   normal transvalvular gradients.  Myxomatous mitral valve disease.  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  Severe mitral regurgitation.  Pacer/ICD wire seen in right ventricle.     EKG performed on (04/23/19) was reviewed:  EKG shows sinus rhythm with right bundle branch block.  EKG performed on (03/11/19) EKG shows paced rhythm.     Laboratory results of (04/11/19) were reviewed. Bun of 24 mg/dl, creatinine levels of 1.07 mg/dl noted.     TRANSESOPHAGEAL ECHOCARDIOGRAM CONCLUSIONS by Dr. Conte (02/12/19)  The left ventricle was normal in size and function.  Left ventricular ejection fraction is visually estimated to be 70%.  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  Severe mitral regurgitation.  Known bioprosthetic aortic valve that is functioning normally.     TRANSESOPHAGEAL ECHOCARDIOGRAM CONCLUSIONS by Dr. Samaniego (04/22/19)  Results pending.  (study result reviewed)    Assessment/Plan  1. Successful transcatheter percutaneous mitral valve repair (MitraClip) with 1clip, under general anesthesia (04/22/19): He is clinically doing well. We will repeat an echocardiogram. He will be discharged to home today.   2. History of surgery for bicuspid aortic valve with homograft valve at [facility] in 2000 and redo aortic valve replacement and ascending aortic aneurysm repair with #23 mm bioprosthetic valve and 28 mm tube graft at [facility] in 02/21/2013.  3. [employer] pacemaker placement by Jam Escobar (12/29/14): The pacemake is functioning well.  We will continue with device interrogation.    CC Rashard Martin and Scottie Chang    Thank you for allowing me to participate in the care of this patient.  I will continue to follow this patient    Please contact me with any questions.    Neto Ramirez M.D.   Cardiologist, [facility]  [phone]

## 2019-04-23 NOTE — ANESTHESIA POSTPROCEDURE EVALUATION
Patient: Ross Mcneal    Procedure Summary     Date:  04/22/19 Room / Location:  [facility] 19 / SURGERY [facility]    Anesthesia Start:  1129 Anesthesia Stop:  1346    Procedures:       REPAIR, MITRAL VALVE, TRANSCATHETER- AND ANY ASSOCIATED PROCEDURES INCLUDING ARTAIL SEPTAL DEFECT CLOSURE AND PERMANENT PACE MAKER PLACEMENT (Right Groin)      ECHOCARDIOGRAM, TRANSESOPHAGEAL (Mouth) Diagnosis:       Severe mitral regurgitation      (SEVERE MITRAL REGURGITATION)    Surgeon:  Neto Ramirez M.D. Responsible Provider:  Brigido Samaniego M.D.    Anesthesia Type:  general ASA Status:  4          Final Anesthesia Type: general  Last vitals  BP   Blood Pressure : 123/69, NIBP: 134/82    Temp   36.7 °C (98 °F)    Pulse   Pulse: 72, Heart Rate (Monitored): 69   Resp   16    SpO2   95 %      Anesthesia Post Evaluation    Patient location during evaluation: PACU  Patient participation: complete - patient participated  Level of consciousness: awake and alert  Pain score: 0    Airway patency: patent  Anesthetic complications: no  Cardiovascular status: hemodynamically stable  Respiratory status: acceptable  Hydration status: euvolemic    PONV: none           Nurse Pain Score: 0 (NPRS)

## 2019-04-23 NOTE — CARE PLAN
Problem: Communication  Goal: The ability to communicate needs accurately and effectively will improve  Outcome: PROGRESSING AS EXPECTED  Discussed POC and medications with patient. Pt provided with education on working with PT prior to discharge. Pt verbalized understanding.      Problem: Venous Thromboembolism (VTW)/Deep Vein Thrombosis (DVT) Prevention:  Goal: Patient will participate in Venous Thrombosis (VTE)/Deep Vein Thrombosis (DVT)Prevention Measures  Outcome: PROGRESSING AS EXPECTED  SINTIA hose in place. Pt ambulating frequently.

## 2019-04-23 NOTE — THERAPY
Pt s/p TAVR seen for PT cardiac rehab phase I eval. He reported familiarity with teachings today but was receptive to review of RPE scale, Home Walking Program parameters, HR and weight monitoring, as well as energy conservation/pacing. During gait x500', pt's HR able to remain below 100bpm. At this time, pt mays snot require further acute PT intervention.

## 2019-04-23 NOTE — CARE PLAN
Problem: Safety  Goal: Will remain free from injury  Outcome: PROGRESSING AS EXPECTED      Problem: Knowledge Deficit  Goal: Knowledge of disease process/condition, treatment plan, diagnostic tests, and medications will improve  Outcome: MET Date Met: 04/22/19

## 2019-04-23 NOTE — DISCHARGE INSTRUCTIONS
Discharge Instructions    Discharged to home by car with relative. Discharged via wheelchair, hospital escort: Yes.  Special equipment needed: Not Applicable    Be sure to schedule a follow-up appointment with your primary care doctor or any specialists as instructed.     Discharge Plan:   Diet Plan: Discussed  Activity Level: Discussed  Confirmed Follow up Appointment: Appointment Scheduled  Confirmed Symptoms Management: Discussed  Medication Reconciliation Updated: Yes  Influenza Vaccine Indication: Not indicated: Previously immunized this influenza season and > 8 years of age    I understand that a diet low in cholesterol, fat, and sodium is recommended for good health. Unless I have been given specific instructions below for another diet, I accept this instruction as my diet prescription.   Other diet: Cardiac    Special Instructions: None     · Is patient discharged on Warfarin / Coumadin?   No     Angiogram, Care After  Refer to this sheet in the next few weeks. These instructions provide you with information about caring for yourself after your procedure. Your health care provider may also give you more specific instructions. Your treatment has been planned according to current medical practices, but problems sometimes occur. Call your health care provider if you have any problems or questions after your procedure.  WHAT TO EXPECT AFTER THE PROCEDURE  After your procedure, it is typical to have the following:  · Bruising at the catheter insertion site that usually fades within 1-2 weeks.  · Blood collecting in the tissue (hematoma) that may be painful to the touch. It should usually decrease in size and tenderness within 1-2 weeks.  HOME CARE INSTRUCTIONS  · Take medicines only as directed by your health care provider.  · You may shower 24-48 hours after the procedure or as directed by your health care provider. Remove the bandage (dressing) and gently wash the site with plain soap and water. Pat the area  dry with a clean towel. Do not rub the site, because this may cause bleeding.  · Do not take baths, swim, or use a hot tub until your health care provider approves.  · Check your insertion site every day for redness, swelling, or drainage.  · Do not apply powder or lotion to the site.  · Do not lift over 10 lb (4.5 kg) for 2 weeks after your procedure or as directed by your health care provider.  · Ask your health care provider when it is okay to:  ¨ Return to work or school.  ¨ Resume usual physical activities or sports.  ¨ Resume sexual activity.  · Do not drive home if you are discharged the same day as the procedure. Have someone else drive you.  · You may drive 24 hours after the procedure unless otherwise instructed by your health care provider.  · Do not operate machinery or power tools for 24 hours after the procedure or as directed by your health care provider.  · If your procedure was done as an outpatient procedure, which means that you went home the same day as your procedure, a responsible adult should be with you for the first 24 hours after you arrive home.  · Keep all follow-up visits as directed by your health care provider. This is important.  SEEK MEDICAL CARE IF:  · You have a fever.  · You have chills.  · You have increased bleeding from the catheter insertion site. Hold pressure on the site.  SEEK IMMEDIATE MEDICAL CARE IF:  · You have unusual pain at the catheter insertion site.  · You have redness, warmth, or swelling at the catheter insertion site.  · You have drainage (other than a small amount of blood on the dressing) from the catheter insertion site.  · The catheter insertion site is bleeding, and the bleeding does not stop after 30 minutes of holding steady pressure on the site.  · The area near or just beyond the catheter insertion site becomes pale, cool, tingly, or numb.     This information is not intended to replace advice given to you by your health care provider. Make sure you  discuss any questions you have with your health care provider.     Document Released: 07/06/2006 Document Revised: 01/08/2016 Document Reviewed: 07/06/2015  [employer] Patient Education ©2016 [employer]    Depression / Suicide Risk    As you are discharged from this [facility] facility, it is important to learn how to keep safe from harming yourself.    Recognize the warning signs:  · Abrupt changes in personality, positive or negative- including increase in energy   · Giving away possessions  · Change in eating patterns- significant weight changes-  positive or negative  · Change in sleeping patterns- unable to sleep or sleeping all the time   · Unwillingness or inability to communicate  · Depression  · Unusual sadness, discouragement and loneliness  · Talk of wanting to die  · Neglect of personal appearance   · Rebelliousness- reckless behavior  · Withdrawal from people/activities they love  · Confusion- inability to concentrate     If you or a loved one observes any of these behaviors or has concerns about self-harm, here's what you can do:  · Talk about it- your feelings and reasons for harming yourself  · Remove any means that you might use to hurt yourself (examples: pills, rope, extension cords, firearm)  · Get professional help from the community (Mental Health, Substance Abuse, psychological counseling)  · Do not be alone:Call your Safe Contact- someone whom you trust who will be there for you.  · Call your local CRISIS HOTLINE 807-1684 or 896-099-6210  · Call your local Children's Mobile Crisis Response Team [place] (805) 805-2092 or www.[employer]  · Call the toll free National Suicide Prevention Hotlines   · National Suicide Prevention Lifeline 285-441-YBFS (8938)  · National Hope Line Network 800-SUICIDE (072-0433)

## 2019-04-23 NOTE — CARE PLAN
Problem: Communication  Goal: The ability to communicate needs accurately and effectively will improve  Outcome: PROGRESSING AS EXPECTED  Discussed POC and medications with patient. Pt educated on bedrest. Pt verbalized understanding.      Problem: Safety  Goal: Will remain free from injury  Outcome: PROGRESSING AS EXPECTED  Bed locked and in lowest position. Bed alarm on. Treaded socks provided. Call light and belongings within reach.  Patient educated to call for assistance. Pt verbalized understanding. Hourly rounding in place.

## 2019-04-24 NOTE — NON-PROVIDER
[facility] Registry ID for Mitral Clip procedure  ID #[id number]

## 2019-04-24 NOTE — DISCHARGE SUMMARY
PRIMARY DISCHARGE DIAGNOSIS: Status post MitraClip    PROCEDURES:    1. Successful MitraClip X1 , transfemoral approach under general anesthesia on 19  2. Intraoperative transesophageal echocardiogram showing:  Intraoperative TRAVIS during mitraclip procedure.  Baseline images show   normal biventricular function with EF = 55%.  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  Severe mitral   regurgitation.  Single mitraclip deployed at A3/P3 interface with mild   residual MR, mean gradient of 1 mm Hg, and no evidence of complication.    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  No   evidence of complication.    3. Echocardiogram on 19 showing:  Normal left ventricular systolic function and regional wall motion.  Left ventricular ejection fraction is visually estimated to be 60%.  The right ventricle was normal in size and function.  Post MITRACLIP repair.  Mitral regurgitation present.  Known bioprosthetic aortic valve that is functioning normally with   normal transvalvular gradients.  Compared to the images of the study done  10/17/18 - there has been   interval placement of a MITRACLIP.  4. CXR on 19 showin. No acute cardiopulmonary abnormalities are identified.  5. EKG on 19 showing:   SINUS RHYTHM rate of 82  RIGHT BUNDLE BRANCH BLOCK     HOSPITAL COURSE: The patient is a pleasant 79 year old male with severe symptomatic mitral regurgitation, prior surgical AVR with homograft in '00 with re-do SAVR and ascending aorta repair in ', HTN, GERD, and PPM placement in '. Due to the patient's symptoms, the patient underwent successful MitraClip described as above. Post-procedure, the patient did well. They didn't require IV diuresis during their stay. They were able to ambulate without difficulty. No further events were noted during their stay. They are now off oxygen and  are to be discharged to home with his wife.    DISCHARGE MEDICATIONS:      Medication List      CONTINUE taking these medications      Instructions   aspirin 81 MG tablet   Take 81 mg by mouth every evening.  Dose:  81 mg     BENADRYL ALLERGY PO   Take 1 Each by mouth at bedtime as needed. As needed for sleep  Dose:  1 Each     Cholecalciferol 400 UNIT Tabs  Commonly known as:  VITAMIN D   Take 400 Units by mouth every day.  Dose:  400 Units     fluticasone 50 MCG/ACT nasal spray  Commonly known as:  FLONASE   Spray 2 Sprays in nose 1 time daily as needed.  Dose:  2 Spray     losartan 50 MG Tabs  Commonly known as:  COZAAR   Take 1 Tab by mouth every day.  Dose:  50 mg     metoprolol SR 25 MG Tb24  Commonly known as:  TOPROL XL   TAKE 1 TABLET BY MOUTH 1/2  HOUR AFTER DINNER AS  DIRECTED     omeprazole 20 MG delayed-release capsule  Commonly known as:  PRILOSEC   Take 1 Cap by mouth every day.  Dose:  20 mg          DISCHARGE INSTRUCTIONS: They are given discharge instructions on potential post-operative complications and symptoms to watch out for. Their groin sites were checked and were clean, dry, and intact. Patient or family to notify us for any complications noted on the discharge instructions. They will follow up with myself, Sakshi SPAULDING, on Friday in our cardiology office. They will not get labs before their follow up appointment. They will then follow up with Dr. Ramirez with a repeat echocardiogram in one month for post MitraClip assessment.     FOLLOW UP  Future Appointments  Date Time Provider Department Center   4/26/2019 1:45 PM JIMMY Javier. [facility] None   9/17/2019 9:15 AM PACER CHECK-CAM B 2 [facility] None

## 2019-04-26 ENCOUNTER — OFFICE VISIT (OUTPATIENT)
Dept: CARDIOLOGY | Facility: MEDICAL CENTER | Age: 79
End: 2019-04-26
Payer: MEDICARE

## 2019-04-26 VITALS
WEIGHT: 197.2 LBS | DIASTOLIC BLOOD PRESSURE: 62 MMHG | SYSTOLIC BLOOD PRESSURE: 112 MMHG | HEART RATE: 80 BPM | BODY MASS INDEX: 25.31 KG/M2 | OXYGEN SATURATION: 94 % | HEIGHT: 74 IN

## 2019-04-26 DIAGNOSIS — Z98.890 S/P THORACIC AORTIC ANEURYSM REPAIR: ICD-10-CM

## 2019-04-26 DIAGNOSIS — Z95.0 BIVENTRICULAR CARDIAC PACEMAKER IN SITU: ICD-10-CM

## 2019-04-26 DIAGNOSIS — I44.0 FIRST DEGREE ATRIOVENTRICULAR BLOCK: ICD-10-CM

## 2019-04-26 DIAGNOSIS — Z95.818 S/P MITRAL VALVE CLIP IMPLANTATION: ICD-10-CM

## 2019-04-26 DIAGNOSIS — I49.3 PVCS (PREMATURE VENTRICULAR CONTRACTIONS): ICD-10-CM

## 2019-04-26 DIAGNOSIS — I10 ESSENTIAL HYPERTENSION, BENIGN: ICD-10-CM

## 2019-04-26 DIAGNOSIS — Z98.890 S/P MITRAL VALVE CLIP IMPLANTATION: ICD-10-CM

## 2019-04-26 DIAGNOSIS — Z86.79 S/P THORACIC AORTIC ANEURYSM REPAIR: ICD-10-CM

## 2019-04-26 DIAGNOSIS — R73.03 PREDIABETES: ICD-10-CM

## 2019-04-26 DIAGNOSIS — I44.7 LBBB (LEFT BUNDLE BRANCH BLOCK): ICD-10-CM

## 2019-04-26 DIAGNOSIS — Z95.2 S/P AVR (AORTIC VALVE REPLACEMENT): ICD-10-CM

## 2019-04-26 LAB — EKG IMPRESSION: NORMAL

## 2019-04-26 PROCEDURE — 99214 OFFICE O/P EST MOD 30 MIN: CPT | Performed by: NURSE PRACTITIONER

## 2019-04-26 PROCEDURE — 93000 ELECTROCARDIOGRAM COMPLETE: CPT | Performed by: INTERNAL MEDICINE

## 2019-04-26 ASSESSMENT — ENCOUNTER SYMPTOMS
CLAUDICATION: 0
ABDOMINAL PAIN: 0
DIZZINESS: 0
COUGH: 0
FEVER: 0
SHORTNESS OF BREATH: 0
ORTHOPNEA: 0
PND: 0
MYALGIAS: 0
PALPITATIONS: 0

## 2019-04-26 NOTE — PROGRESS NOTES
Chief Complaint   Patient presents with   • Premature Ventricular Contractions (PVCs)     Subjective:   Ross Mcneal is a 79 y.o. male who presents today for hospital follow up S/P Brianna Clip.    He is a patient of Dr. Conte in our office.    Hx of S/P SAVR with re-do AVR and thoracic aorta repair, prostate CA with prostatectomy, PPM, HTN, LBBB, and pre-diabetes.    He is overall doing well. He admits to mild fatigue. His groin site has some mild sanguinous oozing yesterday and today.    He has had no episodes of chest pain, palpitations, dizziness/lightheadedness, shortness of breath, orthopnea, or peripheral edema.    Past Medical History:   Diagnosis Date   • Arthritis     hands   • Basal cell carcinoma of lip 11/27/2013   • Biventricular cardiac pacemaker in situ 3/4/2015   • Breath shortness     with exertion   • Cancer (HCC)     prostate   • GERD (gastroesophageal reflux disease) 10/17/2013   • Heart burn    • Hypertension     • Indigestion    • Insomnia 10/17/2013   • LBBB (left bundle branch block)     • MEDICAL HOME    • Pacemaker    • Prostate cancer (HCC) 2003   • PVCs (premature ventricular contractions) 3/4/2015   • S/P AVR (aortic valve replacement) 2/21/2013    [facility]; [place], [place]   • S/P prostatectomy 2003   • S/P thoracic aortic aneurysm repair 2/21/2013   • Seasonal allergies 10/17/2013   • Valvular heart disease      Past Surgical History:   Procedure Laterality Date   • TRANSCATHETER MITRAL VALVE REPAIR Right 4/22/2019    Procedure: REPAIR, MITRAL VALVE, TRANSCATHETER- AND ANY ASSOCIATED PROCEDURES INCLUDING ARTAIL SEPTAL DEFECT CLOSURE AND PERMANENT PACE MAKER PLACEMENT;  Surgeon: Neto Ramirez M.D.;  Location: SURGERY [facility];  Service: Cardiac   • TRAVIS  4/22/2019    Procedure: ECHOCARDIOGRAM, TRANSESOPHAGEAL;  Surgeon: Neto Ramirez M.D.;  Location: SURGERY [facility];  Service: Cardiac   • RECOVERY  12/29/2014    Performed by  [facility] at SURGERY SAME DAY [facility] ORS   • AORTIC VALVE REPLACEMENT  13    Bovine tissur valve with ascending aortic repair..  Done in [place].   • [facility] CARDIAC CATH  2013    normal coronaries prior to AVR.   • OTHER      carcinoma removed from lip   • PROSTATECTOMY, RADIAL  .    prostate cancer.   • APPENDECTOMY     • MITRAL VALVE REPLACE      S/P lisa Clip X1   • OTHER CARDIAC SURGERY   and     aVR. Thoracic aortic aneurysm repair.     Family History   Problem Relation Age of Onset   • Other Mother          at age 82. Parkinson's disease.   • Cancer Father          at age 68. Prostate cancer.   • Cancer Sister         Lung   • Heart Disease Neg Hx      Social History     Social History   • Marital status:      Spouse name: N/A   • Number of children: N/A   • Years of education: N/A     Occupational History   • Not on file.     Social History Main Topics   • Smoking status: Never Smoker   • Smokeless tobacco: Never Used   • Alcohol use Yes      Comment: 1/day   • Drug use: No   • Sexual activity: Yes     Partners: Female     Other Topics Concern   • Not on file     Social History Narrative    Retired . . Moved to [place] in .     No Known Allergies  Outpatient Encounter Prescriptions as of 2019   Medication Sig Dispense Refill   • fluticasone (FLONASE) 50 MCG/ACT nasal spray Spray 2 Sprays in nose 1 time daily as needed.     • metoprolol SR (TOPROL XL) 25 MG TABLET SR 24 HR TAKE 1 TABLET BY MOUTH 1/2  HOUR AFTER DINNER AS  DIRECTED 90 Tab 3   • omeprazole (PRILOSEC) 20 MG delayed-release capsule Take 1 Cap by mouth every day. 90 Cap 3   • losartan (COZAAR) 50 MG Tab Take 1 Tab by mouth every day. 90 Tab 3   • Cholecalciferol (VITAMIN D) 400 UNIT TABS Take 400 Units by mouth every day.     • DiphenhydrAMINE HCl (BENADRYL ALLERGY PO) Take 1 Each by mouth at bedtime as needed. As needed for sleep      • aspirin 81 MG tablet Take 81  "mg by mouth every evening.       No facility-administered encounter medications on file as of 4/26/2019.      Review of Systems   Constitutional: Positive for malaise/fatigue. Negative for fever.        Mild fatigue   Respiratory: Negative for cough and shortness of breath.    Cardiovascular: Negative for chest pain, palpitations, orthopnea, claudication, leg swelling and PND.   Gastrointestinal: Negative for abdominal pain.   Musculoskeletal: Negative for myalgias.   Neurological: Negative for dizziness.        Objective:   /62 (BP Location: Left arm, Patient Position: Sitting, BP Cuff Size: Adult)   Pulse 80   Ht 1.88 m (6' 2\")   Wt 89.4 kg (197 lb 3.2 oz)   SpO2 94%   BMI 25.32 kg/m²     Physical Exam   Constitutional: He appears well-developed and well-nourished.   HENT:   Head: Normocephalic and atraumatic.   Eyes: EOM are normal.   Neck: No JVD present.   Cardiovascular: Normal rate, regular rhythm, normal heart sounds and intact distal pulses.    Pulmonary/Chest: Effort normal and breath sounds normal.   Musculoskeletal: Normal range of motion. He exhibits no edema.   Skin: Skin is warm and dry.   Psychiatric: He has a normal mood and affect.   Nursing note and vitals reviewed.      Assessment:     1. S/P mitral valve clip implantation  EKG   2. Biventricular cardiac pacemaker in situ     3. Essential hypertension, benign     4. First degree atrioventricular block     5. LBBB (left bundle branch block)     6. Prediabetes     7. PVCs (premature ventricular contractions)     8. S/P AVR (aortic valve replacement)     9. S/P thoracic aortic aneurysm repair       Medical Decision Making:  Today's Assessment / Status / Plan:     1. S/P Brianna Clip  -doing well  -echo WNL  -echo 1 month with valve clinic fu apt  -discussed SBE prophylaxis again  -cont asa lifelong    2. PPM  -check in Sept  -EKG WNL today    3. S/P AVR with re-do and thoracic aorta repair  -stable on echo  -follow yearly with echo    4. " HTN  -great control on losartan and toprol  -follow with primary cardiologist    FU in clinic in 1 month with JI with echo; 3-6 months with PPM check and SW apt    Patient verbalizes understanding and agrees with the plan of care.     Collaborating MD: Jocelynn HERNANDEZ

## 2019-05-09 ENCOUNTER — OFFICE VISIT (OUTPATIENT)
Dept: URGENT CARE | Facility: PHYSICIAN GROUP | Age: 79
End: 2019-05-09
Payer: MEDICARE

## 2019-05-09 VITALS
HEART RATE: 73 BPM | BODY MASS INDEX: 23.75 KG/M2 | SYSTOLIC BLOOD PRESSURE: 122 MMHG | WEIGHT: 185 LBS | TEMPERATURE: 97.6 F | OXYGEN SATURATION: 96 % | RESPIRATION RATE: 16 BRPM | DIASTOLIC BLOOD PRESSURE: 82 MMHG

## 2019-05-09 DIAGNOSIS — Z91.09 ENVIRONMENTAL ALLERGIES: ICD-10-CM

## 2019-05-09 DIAGNOSIS — J01.40 ACUTE PANSINUSITIS, RECURRENCE NOT SPECIFIED: ICD-10-CM

## 2019-05-09 PROCEDURE — 99214 OFFICE O/P EST MOD 30 MIN: CPT | Performed by: NURSE PRACTITIONER

## 2019-05-09 RX ORDER — AMOXICILLIN AND CLAVULANATE POTASSIUM 875; 125 MG/1; MG/1
1 TABLET, FILM COATED ORAL 2 TIMES DAILY
Qty: 14 TAB | Refills: 0 | Status: SHIPPED | OUTPATIENT
Start: 2019-05-09 | End: 2019-05-16

## 2019-05-09 ASSESSMENT — ENCOUNTER SYMPTOMS
SHORTNESS OF BREATH: 0
COUGH: 1
SINUS PRESSURE: 1
CHILLS: 0
NECK PAIN: 0
DIAPHORESIS: 0
HEADACHES: 0
HOARSE VOICE: 1
SWOLLEN GLANDS: 0
SORE THROAT: 1

## 2019-05-09 NOTE — PROGRESS NOTES
Subjective:      Ross Mcneal is a 79 y.o. male who presents with Sinus Problem (x 10 days, pressure under eyes,drainage down throat)    Reviewed past medical, surgical and family history with patient. Reviewed prescription and OTC medications with patient in electronic health record today.     No Known Allergies          Sinusitis   This is a new problem. The current episode started 1 to 4 weeks ago. The problem has been rapidly worsening since onset. There has been no fever. His pain is at a severity of 6/10. The pain is moderate. Associated symptoms include congestion, coughing, a hoarse voice, sinus pressure, sneezing and a sore throat. Pertinent negatives include no chills, diaphoresis, ear pain, headaches, neck pain, shortness of breath or swollen glands. Past treatments include acetaminophen and saline sprays (humidifier). The treatment provided mild relief.       Review of Systems   Constitutional: Negative for chills and diaphoresis.   HENT: Positive for congestion, hoarse voice, sinus pressure, sneezing and sore throat. Negative for ear pain.    Respiratory: Positive for cough. Negative for shortness of breath.    Musculoskeletal: Negative for neck pain.   Neurological: Negative for headaches.          Objective:     /82 (BP Location: Right arm, Patient Position: Sitting, BP Cuff Size: Adult)   Pulse 73   Temp 36.4 °C (97.6 °F) (Temporal)   Resp 16   Wt 83.9 kg (185 lb)   SpO2 96%   BMI 23.75 kg/m²      Physical Exam   Constitutional: Vital signs are normal. He appears well-developed and well-nourished.  Non-toxic appearance. He does not have a sickly appearance.   HENT:   Head: Normocephalic.   Right Ear: Hearing, external ear and ear canal normal. Tympanic membrane is injected and bulging.   Left Ear: Hearing, external ear and ear canal normal. Tympanic membrane is injected.   Nose: Mucosal edema and rhinorrhea present. Right sinus exhibits maxillary sinus tenderness and frontal sinus  tenderness. Left sinus exhibits maxillary sinus tenderness and frontal sinus tenderness.   Mouth/Throat: Uvula is midline and mucous membranes are normal. No uvula swelling. No oropharyngeal exudate, posterior oropharyngeal edema or tonsillar abscesses. Posterior oropharyngeal erythema: mild erythema in posterior pharynx.   Eyes: Conjunctivae and lids are normal.   Neck: Trachea normal, normal range of motion and full passive range of motion without pain. Neck supple.   Cardiovascular: Normal rate, regular rhythm, intact distal pulses and normal pulses.  PMI is not displaced.    No JVD  No peripheral edema     Pulmonary/Chest: Effort normal and breath sounds normal.   Abdominal: Soft.   Lymphadenopathy:        Head (right side): No submental, no submandibular and no tonsillar adenopathy present.        Head (left side): No submental, no submandibular and no tonsillar adenopathy present.     He has no cervical adenopathy.        Right: No supraclavicular adenopathy present.        Left: No supraclavicular adenopathy present.   Neurological: He is alert.   Skin: Skin is warm, dry and intact.   Psychiatric: He has a normal mood and affect. His speech is normal.   Nursing note and vitals reviewed.              Assessment/Plan:     1. Acute pansinusitis, recurrence not specified  amoxicillin-clavulanate (AUGMENTIN) 875-125 MG Tab   2. Environmental allergies         OTC antihistamine of choice. Follow manufactures dosing and safety guidelines.      Humidifier at night prn     OTC flonase for 10-14 days. Dosage and directions per     Return to clinic or PCP  5-7 days if current symptoms are not resolving in a satisfactory manner or sooner if new or worsening symptoms occur.   Patient was advised of signs and symptoms which would warrant further evaluation and /or emergent evaluation in ER.  Verbalized agreement with this treatment plan and seemed to understand without barriers. Questions were encouraged and  answered to patients satisfaction.     Aftercare instructions were given to pt

## 2019-05-16 ENCOUNTER — OFFICE VISIT (OUTPATIENT)
Dept: MEDICAL GROUP | Facility: MEDICAL CENTER | Age: 79
End: 2019-05-16
Payer: MEDICARE

## 2019-05-16 VITALS
HEIGHT: 74 IN | DIASTOLIC BLOOD PRESSURE: 72 MMHG | SYSTOLIC BLOOD PRESSURE: 132 MMHG | OXYGEN SATURATION: 94 % | WEIGHT: 193 LBS | TEMPERATURE: 97.7 F | HEART RATE: 78 BPM | BODY MASS INDEX: 24.77 KG/M2

## 2019-05-16 DIAGNOSIS — J30.2 SEASONAL ALLERGIES: ICD-10-CM

## 2019-05-16 DIAGNOSIS — J40 BRONCHITIS: ICD-10-CM

## 2019-05-16 PROCEDURE — 99214 OFFICE O/P EST MOD 30 MIN: CPT | Performed by: FAMILY MEDICINE

## 2019-05-16 RX ORDER — CODEINE PHOSPHATE AND GUAIFENESIN 10; 100 MG/5ML; MG/5ML
5 SOLUTION ORAL EVERY 4 HOURS PRN
Qty: 200 ML | Refills: 0 | Status: SHIPPED
Start: 2019-05-16 | End: 2019-05-26

## 2019-05-16 RX ORDER — AZITHROMYCIN 250 MG/1
TABLET, FILM COATED ORAL
Qty: 6 TAB | Refills: 0 | Status: SHIPPED | OUTPATIENT
Start: 2019-05-16 | End: 2019-05-21

## 2019-05-16 RX ORDER — ALBUTEROL SULFATE 90 UG/1
2 AEROSOL, METERED RESPIRATORY (INHALATION) EVERY 6 HOURS PRN
Qty: 8.5 G | Refills: 2 | Status: SHIPPED | OUTPATIENT
Start: 2019-05-16 | End: 2019-09-19

## 2019-05-16 RX ORDER — PREDNISONE 20 MG/1
40 TABLET ORAL DAILY
Qty: 10 TAB | Refills: 0 | Status: SHIPPED | OUTPATIENT
Start: 2019-05-16 | End: 2019-05-21

## 2019-05-16 NOTE — PROGRESS NOTES
Subjective:   Ross Mcneal is a 79 y.o. male here today for sinus drainage.    Had sinus symptoms start on 5/1/19. He went to urgent care and was prescribed Augmentin, 1 week ago, with no change in symptoms.  Patient having persistent nasal drainage and post nasal drainage. Having sinus headaches. Increased sneezing. Using Zyrtec irregularly and does admit to seasonal allergies.      Current medicines (including changes today)  Current Outpatient Prescriptions   Medication Sig Dispense Refill   • azithromycin (ZITHROMAX) 250 MG Tab 2 tabs by mouth day 1, 1 tab by mouth days 2-5 6 Tab 0   • predniSONE (DELTASONE) 20 MG Tab Take 2 Tabs by mouth every day for 5 days. 10 Tab 0   • albuterol 108 (90 Base) MCG/ACT Aero Soln inhalation aerosol Inhale 2 Puffs by mouth every 6 hours as needed for Shortness of Breath. 8.5 g 2   • guaifenesin-codeine (CHERATUSSIN AC) Solution oral solution Take 5 mL by mouth every four hours as needed for Cough for up to 10 days. 200 mL 0   • fluticasone (FLONASE) 50 MCG/ACT nasal spray Spray 2 Sprays in nose 1 time daily as needed.     • metoprolol SR (TOPROL XL) 25 MG TABLET SR 24 HR TAKE 1 TABLET BY MOUTH 1/2  HOUR AFTER DINNER AS  DIRECTED 90 Tab 3   • omeprazole (PRILOSEC) 20 MG delayed-release capsule Take 1 Cap by mouth every day. 90 Cap 3   • losartan (COZAAR) 50 MG Tab Take 1 Tab by mouth every day. 90 Tab 3   • Cholecalciferol (VITAMIN D) 400 UNIT TABS Take 400 Units by mouth every day.     • DiphenhydrAMINE HCl (BENADRYL ALLERGY PO) Take 1 Each by mouth at bedtime as needed. As needed for sleep      • aspirin 81 MG tablet Take 81 mg by mouth every evening.       No current facility-administered medications for this visit.      He  has a past medical history of Arthritis; Basal cell carcinoma of lip (11/27/2013); Biventricular cardiac pacemaker in situ (3/4/2015); Breath shortness; Cancer (HCC); GERD (gastroesophageal reflux disease) (10/17/2013); Heart burn; Hypertension (  "); Indigestion; Insomnia (10/17/2013); LBBB (left bundle branch block) ( ); MEDICAL HOME; Pacemaker; Prostate cancer (HCC) (2003); PVCs (premature ventricular contractions) (3/4/2015); S/P AVR (aortic valve replacement) (2/21/2013); S/P prostatectomy (2003); S/P thoracic aortic aneurysm repair (2/21/2013); Seasonal allergies (10/17/2013); and Valvular heart disease.    ROS   No sore throat, no ear pain, no fever, no nausea, no GERD.       Objective:     /72 (BP Location: Right arm, Patient Position: Sitting)   Pulse 78   Temp 36.5 °C (97.7 °F)   Ht 1.88 m (6' 2\")   Wt 87.5 kg (193 lb)   SpO2 94%  Body mass index is 24.78 kg/m².  Physical Exam:  Constitutional: Alert, no distress.  Skin: Warm, dry, good turgor, no rashes in visible areas.  Eye: Equal, round and reactive, conjunctiva clear, lids normal.  ENMT: Lips without lesions, good dentition, oropharynx clear.  TMs pearly gray bilaterally.  Positive thick clear nasal drainage bilaterally.  Neck: Trachea midline, no masses, no thyromegaly. No cervical or supraclavicular lymphadenopathy  Respiratory: Unlabored respiratory effort, + bilateral expiratory wheeze, positive bilateral lower lobe crackles  Psych: Alert and oriented x3, normal affect and mood.        Assessment and Plan:   The following treatment plan was discussed    1. Bronchitis  New problem.  Prescription for a Z-Aleksandar, prednisone, albuterol as needed and codeine cough syrup to help him sleep at night.  If no improvement in next 4 to 5 days, patient is to follow-up again for a reevaluation.  - azithromycin (ZITHROMAX) 250 MG Tab; 2 tabs by mouth day 1, 1 tab by mouth days 2-5  Dispense: 6 Tab; Refill: 0  - predniSONE (DELTASONE) 20 MG Tab; Take 2 Tabs by mouth every day for 5 days.  Dispense: 10 Tab; Refill: 0  - albuterol 108 (90 Base) MCG/ACT Aero Soln inhalation aerosol; Inhale 2 Puffs by mouth every 6 hours as needed for Shortness of Breath.  Dispense: 8.5 g; Refill: 2  - " guaifenesin-codeine (CHERATUSSIN AC) Solution oral solution; Take 5 mL by mouth every four hours as needed for Cough for up to 10 days.  Dispense: 200 mL; Refill: 0    2. Seasonal allergies  Advised patient to use Zyrtec at night.      Followup: Return if symptoms worsen or fail to improve.

## 2019-05-20 ENCOUNTER — TELEPHONE (OUTPATIENT)
Dept: MEDICAL GROUP | Facility: MEDICAL CENTER | Age: 79
End: 2019-05-20

## 2019-05-20 NOTE — TELEPHONE ENCOUNTER
If he is not feeling better, please have him schedule an appointment so we can listen to his lungs and take another look at him.  Scottie Packer M.D.

## 2019-05-20 NOTE — TELEPHONE ENCOUNTER
Patient reports that his symptoms have improved but he still has a cough and significant drainage. He finished his antibiotics today. Patient was told to give us an update at his appointment.

## 2019-05-23 ENCOUNTER — HOSPITAL ENCOUNTER (OUTPATIENT)
Dept: RADIOLOGY | Facility: MEDICAL CENTER | Age: 79
End: 2019-05-23
Attending: FAMILY MEDICINE
Payer: MEDICARE

## 2019-05-23 ENCOUNTER — TELEPHONE (OUTPATIENT)
Dept: MEDICAL GROUP | Facility: MEDICAL CENTER | Age: 79
End: 2019-05-23

## 2019-05-23 ENCOUNTER — OFFICE VISIT (OUTPATIENT)
Dept: MEDICAL GROUP | Facility: MEDICAL CENTER | Age: 79
End: 2019-05-23
Payer: MEDICARE

## 2019-05-23 VITALS
SYSTOLIC BLOOD PRESSURE: 122 MMHG | OXYGEN SATURATION: 94 % | HEIGHT: 74 IN | BODY MASS INDEX: 24.77 KG/M2 | HEART RATE: 68 BPM | TEMPERATURE: 97.7 F | WEIGHT: 193 LBS | DIASTOLIC BLOOD PRESSURE: 76 MMHG

## 2019-05-23 DIAGNOSIS — R05.9 COUGH: ICD-10-CM

## 2019-05-23 PROCEDURE — 71046 X-RAY EXAM CHEST 2 VIEWS: CPT

## 2019-05-23 PROCEDURE — 99214 OFFICE O/P EST MOD 30 MIN: CPT | Performed by: FAMILY MEDICINE

## 2019-05-23 RX ORDER — IPRATROPIUM BROMIDE 42 UG/1
2 SPRAY, METERED NASAL 3 TIMES DAILY
Qty: 1 BOTTLE | Refills: 2 | Status: SHIPPED | OUTPATIENT
Start: 2019-05-23 | End: 2019-09-19

## 2019-05-23 NOTE — TELEPHONE ENCOUNTER
----- Message from Scottie Packer M.D. sent at 5/23/2019  2:48 PM PDT -----  Please notify patient that his chest x-ray shows no infection or fluid in his lungs.  Scottie Packer M.D.

## 2019-05-23 NOTE — PROGRESS NOTES
Subjective:   Ross Mcneal is a 79 y.o. male here today for cough    Cough  Patient was seen at urgent care on 5/1 and given Augmentin for possible sinusitis.  His symptoms did not improve so he came and saw was 1 week ago and was given a prescription for Z-Aleksandar and prednisone for 5 days.  He feels like overall his symptoms have improved but he still having consistent nasal drainage and cough.  Using albuterol 2-3 times per day, does not make much of a difference.  Patient is also using Flonase and over-the-counter antihistamines.  Codeine cough syrup is helping him sleep at night.  He is still having tightness of chest and wheezing.  1 month ago patient had mitral valve repair, transcatheter.         Current medicines (including changes today)  Current Outpatient Prescriptions   Medication Sig Dispense Refill   • ipratropium (ATROVENT) 0.06 % Solution Spray 2 Sprays in nose 3 times a day. 1 Bottle 2   • albuterol 108 (90 Base) MCG/ACT Aero Soln inhalation aerosol Inhale 2 Puffs by mouth every 6 hours as needed for Shortness of Breath. 8.5 g 2   • guaifenesin-codeine (CHERATUSSIN AC) Solution oral solution Take 5 mL by mouth every four hours as needed for Cough for up to 10 days. 200 mL 0   • fluticasone (FLONASE) 50 MCG/ACT nasal spray Spray 2 Sprays in nose 1 time daily as needed.     • metoprolol SR (TOPROL XL) 25 MG TABLET SR 24 HR TAKE 1 TABLET BY MOUTH 1/2  HOUR AFTER DINNER AS  DIRECTED 90 Tab 3   • omeprazole (PRILOSEC) 20 MG delayed-release capsule Take 1 Cap by mouth every day. 90 Cap 3   • losartan (COZAAR) 50 MG Tab Take 1 Tab by mouth every day. 90 Tab 3   • Cholecalciferol (VITAMIN D) 400 UNIT TABS Take 400 Units by mouth every day.     • DiphenhydrAMINE HCl (BENADRYL ALLERGY PO) Take 1 Each by mouth at bedtime as needed. As needed for sleep      • aspirin 81 MG tablet Take 81 mg by mouth every evening.       No current facility-administered medications for this visit.      He  has a past  "medical history of Arthritis; Basal cell carcinoma of lip (11/27/2013); Biventricular cardiac pacemaker in situ (3/4/2015); Breath shortness; Cancer (HCC); GERD (gastroesophageal reflux disease) (10/17/2013); Heart burn; Hypertension ( ); Indigestion; Insomnia (10/17/2013); LBBB (left bundle branch block) ( ); MEDICAL HOME; Pacemaker; Prostate cancer (HCC) (2003); PVCs (premature ventricular contractions) (3/4/2015); S/P AVR (aortic valve replacement) (2/21/2013); S/P prostatectomy (2003); S/P thoracic aortic aneurysm repair (2/21/2013); Seasonal allergies (10/17/2013); and Valvular heart disease.    ROS   No fever, positive rhinorrhea, positive postnasal drainage, positive cough       Objective:     /76 (BP Location: Right arm, Patient Position: Sitting)   Pulse 68   Temp 36.5 °C (97.7 °F)   Ht 1.88 m (6' 2\")   Wt 87.5 kg (193 lb)   SpO2 94%  Body mass index is 24.78 kg/m².   Physical Exam:  Constitutional: Alert, no distress.  Skin: Warm, dry, good turgor, no rashes in visible areas.  Eye: Equal, round and reactive, conjunctiva clear, lids normal.  Respiratory: Unlabored respiratory effort, minimal scattered wheeze, positive rhonchi.  Psych: Alert and oriented x3, normal affect and mood.        Assessment and Plan:   The following treatment plan was discussed    1. Cough  New problem.  Most likely not viral as his symptoms do not spontaneously resolve like a viral infection, most likely not bacterial as he has been treated for typical and atypical infections without resolution of symptoms.  Given high pollen levels recently, most likely environmental.  Continue antihistamines.  We will do a trial of Atrovent nasal.  Chest x-ray ordered to evaluate lungs.  If no improvement in the next 5 days consider Singulair.  - DX-CHEST-2 VIEWS; Future  - ipratropium (ATROVENT) 0.06 % Solution; Spray 2 Sprays in nose 3 times a day.  Dispense: 1 Bottle; Refill: 2      Followup: Return if symptoms worsen or fail to " improve.

## 2019-05-23 NOTE — ASSESSMENT & PLAN NOTE
Patient was seen at urgent care on 5/1 and given Augmentin for possible sinusitis.  His symptoms did not improve so he came and saw was 1 week ago and was given a prescription for Z-Aleksandar and prednisone for 5 days.  He feels like overall his symptoms have improved but he still having consistent nasal drainage and cough.  Using albuterol 2-3 times per day, does not make much of a difference.  Patient is also using Flonase and over-the-counter antihistamines.  Codeine cough syrup is helping him sleep at night.  He is still having tightness of chest and wheezing.  1 month ago patient had mitral valve repair, transcatheter.

## 2019-05-27 ENCOUNTER — HOSPITAL ENCOUNTER (OUTPATIENT)
Dept: CARDIOLOGY | Facility: MEDICAL CENTER | Age: 79
End: 2019-05-27
Attending: INTERNAL MEDICINE
Payer: MEDICARE

## 2019-05-27 DIAGNOSIS — Z95.818 S/P MITRAL VALVE CLIP IMPLANTATION: ICD-10-CM

## 2019-05-27 DIAGNOSIS — Z98.890 S/P MITRAL VALVE CLIP IMPLANTATION: ICD-10-CM

## 2019-05-27 PROCEDURE — 93306 TTE W/DOPPLER COMPLETE: CPT | Mod: 26 | Performed by: INTERNAL MEDICINE

## 2019-05-27 PROCEDURE — 93306 TTE W/DOPPLER COMPLETE: CPT

## 2019-05-28 ENCOUNTER — TELEPHONE (OUTPATIENT)
Dept: CARDIOLOGY | Facility: MEDICAL CENTER | Age: 79
End: 2019-05-28

## 2019-05-28 LAB
LV EJECT FRACT  99904: 65
LV EJECT FRACT MOD 2C 99903: 58.86
LV EJECT FRACT MOD 4C 99902: 68.17
LV EJECT FRACT MOD BP 99901: 67.03

## 2019-05-28 NOTE — TELEPHONE ENCOUNTER
----- Message -----   From: Neto Ramirez M.D.   Sent: 5/28/2019   9:17 AM   To: Bri Santiago R.N.     Please call patient with unremarkable echocardiogram results showing normally functioning surgical aortic valve replacement and MitraClip.     Thanks.  RANJANA.      Results letter mailed to pt.

## 2019-05-28 NOTE — LETTER
May 28, 2019        Ross Mcneal  [address] [facility] [address]          Dear Ross,    We have received the results of your recent:    Echocardiogram.     Your test came back showing normally functioning surgical aortic valve replacement and MitraClip. Good news! Please follow up as previously discussed with your physician.      Feel free to call us with any questions.        Sincerely,    Aislinn GARG

## 2019-05-30 ENCOUNTER — OFFICE VISIT (OUTPATIENT)
Dept: CARDIOLOGY | Facility: MEDICAL CENTER | Age: 79
End: 2019-05-30
Payer: MEDICARE

## 2019-05-30 VITALS
SYSTOLIC BLOOD PRESSURE: 122 MMHG | WEIGHT: 194.89 LBS | HEIGHT: 74 IN | HEART RATE: 75 BPM | OXYGEN SATURATION: 96 % | DIASTOLIC BLOOD PRESSURE: 72 MMHG | BODY MASS INDEX: 25.01 KG/M2

## 2019-05-30 DIAGNOSIS — Z95.818 S/P MITRAL VALVE CLIP IMPLANTATION: ICD-10-CM

## 2019-05-30 DIAGNOSIS — Z98.890 S/P THORACIC AORTIC ANEURYSM REPAIR: ICD-10-CM

## 2019-05-30 DIAGNOSIS — Z98.890 S/P MITRAL VALVE CLIP IMPLANTATION: ICD-10-CM

## 2019-05-30 DIAGNOSIS — Z95.2 S/P AVR (AORTIC VALVE REPLACEMENT): ICD-10-CM

## 2019-05-30 DIAGNOSIS — Z86.79 S/P THORACIC AORTIC ANEURYSM REPAIR: ICD-10-CM

## 2019-05-30 PROCEDURE — 99024 POSTOP FOLLOW-UP VISIT: CPT | Performed by: INTERNAL MEDICINE

## 2019-05-30 ASSESSMENT — ENCOUNTER SYMPTOMS
RESPIRATORY NEGATIVE: 1
CONSTITUTIONAL NEGATIVE: 1
CHILLS: 0
ABDOMINAL PAIN: 0
DOUBLE VISION: 0
MYALGIAS: 0
CARDIOVASCULAR NEGATIVE: 1
PALPITATIONS: 0
NAUSEA: 0
VOMITING: 0
PSYCHIATRIC NEGATIVE: 1
CLAUDICATION: 0
BRUISES/BLEEDS EASILY: 0
EYES NEGATIVE: 1
FEVER: 0
COUGH: 0
HEADACHES: 0
DIZZINESS: 0
NERVOUS/ANXIOUS: 0
MUSCULOSKELETAL NEGATIVE: 1
WEIGHT LOSS: 0
BLURRED VISION: 0
SHORTNESS OF BREATH: 0
FOCAL WEAKNESS: 0
GASTROINTESTINAL NEGATIVE: 1
NEUROLOGICAL NEGATIVE: 1
WEAKNESS: 0
DEPRESSION: 0

## 2019-05-30 NOTE — LETTER
[facility]   [address], [facility] [address]  [place] [address]  Phone: [phone]  Fax: [phone]              Ross Mcneal  1940    Encounter Date: 5/30/2019    Neto Ramirez M.D.          PROGRESS NOTE:  Chief Complaint   Patient presents with   • Shortness of Breath     s/p mitral valve clip implantation       Subjective:   Ross Mcneal is a 79 y.o. male who presents today for hospital follow up.    Since the discharge from [facility] on 04/23/19 status post MitraClip, he has been doing well. He admits to . He denies fatigue, shortness of breath, dyspnea on exertion, chest pain, dizziness or syncope.    Past Medical History:   Diagnosis Date   • Arthritis     hands   • Basal cell carcinoma of lip 11/27/2013   • Biventricular cardiac pacemaker in situ 3/4/2015   • Breath shortness     with exertion   • Cancer (HCC)     prostate   • GERD (gastroesophageal reflux disease) 10/17/2013   • Heart burn    • Hypertension     • Indigestion    • Insomnia 10/17/2013   • LBBB (left bundle branch block)     • MEDICAL HOME    • Pacemaker    • Prostate cancer (HCC) 2003   • PVCs (premature ventricular contractions) 3/4/2015   • S/P AVR (aortic valve replacement) 2/21/2013    [facility]; [place], [place]   • S/P prostatectomy 2003   • S/P thoracic aortic aneurysm repair 2/21/2013   • Seasonal allergies 10/17/2013   • Valvular heart disease      Past Surgical History:   Procedure Laterality Date   • TRANSCATHETER MITRAL VALVE REPAIR Right 4/22/2019    Procedure: REPAIR, MITRAL VALVE, TRANSCATHETER- AND ANY ASSOCIATED PROCEDURES INCLUDING ARTAIL SEPTAL DEFECT CLOSURE AND PERMANENT PACE MAKER PLACEMENT;  Surgeon: Neto Ramirez M.D.;  Location: SURGERY [facility];  Service: Cardiac   • TRAVIS  4/22/2019    Procedure: ECHOCARDIOGRAM, TRANSESOPHAGEAL;  Surgeon: Neto Ramirez M.D.;  Location: SURGERY [facility];  Service:  Cardiac   • RECOVERY  2014    Performed by Cath-Recovery Surgery at SURGERY SAME DAY [facility]   • AORTIC VALVE REPLACEMENT  13    Bovine tissur valve with ascending aortic repair..  Done in [place].   • [facility] CARDIAC CATH  2013    normal coronaries prior to AVR.   • OTHER      carcinoma removed from lip   • PROSTATECTOMY, RADIAL  .    prostate cancer.   • APPENDECTOMY     • MITRAL VALVE REPLACE      S/P lisa Clip X1   • OTHER CARDIAC SURGERY   and     aVR. Thoracic aortic aneurysm repair.     Family History   Problem Relation Age of Onset   • Other Mother          at age 82. Parkinson's disease.   • Cancer Father          at age 68. Prostate cancer.   • Cancer Sister         Lung   • Heart Disease Neg Hx      Social History     Social History   • Marital status:      Spouse name: N/A   • Number of children: N/A   • Years of education: N/A     Occupational History   • Not on file.     Social History Main Topics   • Smoking status: Never Smoker   • Smokeless tobacco: Never Used   • Alcohol use Yes      Comment: 1/day   • Drug use: No   • Sexual activity: Yes     Partners: Female     Other Topics Concern   • Not on file     Social History Narrative    Retired . . Moved to [place] in .     No Known Allergies     Medications reviewed.    Outpatient Encounter Prescriptions as of 2019   Medication Sig Dispense Refill   • ipratropium (ATROVENT) 0.06 % Solution Spray 2 Sprays in nose 3 times a day. 1 Bottle 2   • albuterol 108 (90 Base) MCG/ACT Aero Soln inhalation aerosol Inhale 2 Puffs by mouth every 6 hours as needed for Shortness of Breath. 8.5 g 2   • fluticasone (FLONASE) 50 MCG/ACT nasal spray Spray 2 Sprays in nose 1 time daily as needed.     • metoprolol SR (TOPROL XL) 25 MG TABLET SR 24 HR TAKE 1 TABLET BY MOUTH 1/2  HOUR AFTER DINNER AS  DIRECTED 90 Tab 3   • omeprazole (PRILOSEC) 20 MG delayed-release capsule Take 1 Cap by mouth every  "day. 90 Cap 3   • losartan (COZAAR) 50 MG Tab Take 1 Tab by mouth every day. 90 Tab 3   • Cholecalciferol (VITAMIN D) 400 UNIT TABS Take 400 Units by mouth every day.     • DiphenhydrAMINE HCl (BENADRYL ALLERGY PO) Take 1 Each by mouth at bedtime as needed. As needed for sleep      • aspirin 81 MG tablet Take 81 mg by mouth every evening.       No facility-administered encounter medications on file as of 5/30/2019.      Review of Systems   Constitutional: Negative.  Negative for chills, fever, malaise/fatigue and weight loss.   HENT: Negative.  Negative for hearing loss.    Eyes: Negative.  Negative for blurred vision and double vision.   Respiratory: Negative.  Negative for cough and shortness of breath.    Cardiovascular: Negative.  Negative for chest pain, palpitations, claudication and leg swelling.   Gastrointestinal: Negative.  Negative for abdominal pain, nausea and vomiting.   Genitourinary: Negative.  Negative for dysuria and urgency.   Musculoskeletal: Negative.  Negative for joint pain and myalgias.   Skin: Negative.  Negative for itching and rash.   Neurological: Negative.  Negative for dizziness, focal weakness, weakness and headaches.   Endo/Heme/Allergies: Negative.  Does not bruise/bleed easily.   Psychiatric/Behavioral: Negative.  Negative for depression. The patient is not nervous/anxious.         Objective:   /72 (BP Location: Left arm, Patient Position: Sitting, BP Cuff Size: Adult)   Pulse 75   Ht 1.88 m (6' 2\")   Wt 88.4 kg (194 lb 14.2 oz)   SpO2 96%   BMI 25.02 kg/m²      Physical Exam   Constitutional: He is oriented to person, place, and time. He appears well-developed and well-nourished.   HENT:   Head: Normocephalic and atraumatic.   Eyes: EOM are normal.   Neck: No JVD present.   Cardiovascular: Normal rate, regular rhythm and normal heart sounds.    Pulmonary/Chest: Effort normal and breath sounds normal.   Abdominal: Soft. Bowel sounds are normal.   No hepatosplenomegaly.   "   Musculoskeletal: Normal range of motion.   Lymphadenopathy:     He has no cervical adenopathy.   Neurological: He is alert and oriented to person, place, and time.   Skin: Skin is warm and dry.   Psychiatric: He has a normal mood and affect.     CARDIAC STUDIES/PROCEDURES:     CARDIAC CATHETERIZATION CONCLUSIONS by Dr. Conte (03/12/19)  1.  Severe mitral regurgitation.  2.  Left ventricular ejection fraction 61% with normal wall motion.  3.  Coronary arteries are angiographically normal.  4.  Normal pulmonary pressures.  5.  Elevated bioprosthetic aortic valve pullback gradient.  6.  Elevated LVEDP.    ECHOCARDIOGRAM CONCLUSIONS (05/27/19)  Prior study done 4/23/19 - compared to the report of the study done -   there has been no significant change.   Normal left ventricular systolic function.  Left ventricular ejection fraction is visually estimated to be 65%.  Pacer/ICD wire seen in right ventricle.  Known transcatheter mitral valve repair which is functioning normally   with appropriate transvalvular gradient.  Mean transvalvular gradient is 2 mmHg   Normal function of bioprosthetic valve which is working well.  Vmax is  2.40 m/s. Transvalvular gradients are - Peak: 23 mmHg, Mean: 11 mmHg.   Right ventricular systolic pressure is estimated to be 20 mmHg.  (study result reviewed)     ECHOCARDIOGRAM CONCLUSIONS (04/23/19)  Normal left ventricular systolic function and regional wall motion.  Left ventricular ejection fraction is visually estimated to be 60%.  The right ventricle was normal in size and function.  Post MITRACLIP repair.  Mitral regurgitation present.  Known bioprosthetic aortic valve that is functioning normally with   normal transvalvular gradients.  Compared to the images of the study done  10/17/18 - there has been   interval placement of a MITRACLIP.  (study result reviewed)     ECHOCARDIOGRAM CONCLUSIONS (10/17/18)  Normal left ventricular size and systolic function.  Left ventricular  ejection fraction is visually estimated to be 65%.  Mild concentric left ventricular hypertrophy.  Known bioprosthetic aortic valve that is functioning normally with   normal transvalvular gradients.  Myxomatous mitral valve disease.  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  Severe mitral regurgitation.  Pacer/ICD wire seen in right ventricle.     EKG performed on (04/23/19) was reviewed: EKG shows sinus rhythm with right bundle branch block.  EKG performed on (03/11/19) EKG shows paced rhythm.     Laboratory results of (04/23/19) were reviewed. Bun of 26 mg/dl, creatinine levels of 1.05 mg/dl noted.     TRANSESOPHAGEAL ECHOCARDIOGRAM CONCLUSIONS by Dr. Conte (02/12/19)  The left ventricle was normal in size and function.  Left ventricular ejection fraction is visually estimated to be 70%.  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  Severe mitral regurgitation.  Known bioprosthetic aortic valve that is functioning normally.     TRANSESOPHAGEAL ECHOCARDIOGRAM CONCLUSIONS by Dr. Samaniego (04/22/19)  Intraoperative TRAVIS during mitraclip procedure.  Baseline images show   normal biventricular function with EF = 55%.  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  Severe mitral   regurgitation.  Single mitraclip deployed at A3/P3 interface with mild   residual MR, mean gradient of 1 mm Hg, and no evidence of complication.    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  No   evidence of complication.    (study result reviewed)    Assessment:     1. S/P mitral valve clip implantation     2. S/P AVR (aortic valve replacement)     3. S/P thoracic aortic aneurysm repair       Medical Decision Making:  Today's Assessment / Status / Plan:     1. Successful transcatheter percutaneous mitral valve repair (MitraClip) with 1 clip (04/22/19):   2.  History of surgery for bicuspid aortic valve with homograft valve at [facility] in 2000 and [facility]o aortic valve replacement and ascending aortic aneurysm repair with #23 mm bioprosthetic valve and 28 mm tube graft at [facility] in 02/21/2013.  3. [employer] pacemaker placement by Jam Escobar (12/29/14): The pacemake is functioning well.  We will continue with device interrogation.    Greater than  minutes were spent counseling and coordination of care, discussing cardiac issues, medication review. Lab review, study review as mentioned in the body of the report, assessment and plan. We will follow up with her in one year from MitraClip standpoint and also have her follow up with her primary cardiologist, Rashard Martin.    CC Rashard Martin and Scottie Chang           No Recipients

## 2019-05-30 NOTE — PROGRESS NOTES
Chief Complaint   Patient presents with   • Shortness of Breath     s/p mitral valve clip implantation       Subjective:   Ross Mcneal is a 79 y.o. male who presents today for hospital follow up.    Since the discharge from [facility] on 04/23/19 status post MitraClip, he has been doing well. He admits to mild cough. He denies fatigue, shortness of breath, dyspnea on exertion, chest pain, dizziness or syncope. He is improving with sinus infection on antibiotics.    Past Medical History:   Diagnosis Date   • Arthritis     hands   • Basal cell carcinoma of lip 11/27/2013   • Biventricular cardiac pacemaker in situ 3/4/2015   • Breath shortness     with exertion   • Cancer (HCC)     prostate   • GERD (gastroesophageal reflux disease) 10/17/2013   • Heart burn    • Hypertension     • Indigestion    • Insomnia 10/17/2013   • LBBB (left bundle branch block)     • MEDICAL HOME    • Pacemaker    • Prostate cancer (HCC) 2003   • PVCs (premature ventricular contractions) 3/4/2015   • S/P AVR (aortic valve replacement) 2/21/2013    [facility]; [place], [place]   • S/P prostatectomy 2003   • S/P thoracic aortic aneurysm repair 2/21/2013   • Seasonal allergies 10/17/2013   • Valvular heart disease      Past Surgical History:   Procedure Laterality Date   • TRANSCATHETER MITRAL VALVE REPAIR Right 4/22/2019    Procedure: REPAIR, MITRAL VALVE, TRANSCATHETER- AND ANY ASSOCIATED PROCEDURES INCLUDING ARTAIL SEPTAL DEFECT CLOSURE AND PERMANENT PACE MAKER PLACEMENT;  Surgeon: Neto Ramirez M.D.;  Location: SURGERY [facility];  Service: Cardiac   • TRAVIS  4/22/2019    Procedure: ECHOCARDIOGRAM, TRANSESOPHAGEAL;  Surgeon: Neto Ramirez M.D.;  Location: SURGERY [facility];  Service: Cardiac   • RECOVERY  12/29/2014    Performed by Cath-Recovery Surgery at SURGERY SAME DAY [facility]   • AORTIC VALVE REPLACEMENT  2/21/13    Bovine tissur valve with ascending aortic repair..  Done in  [place].   • [facility] CARDIAC CATH  2013    normal coronaries prior to AVR.   • OTHER      carcinoma removed from lip   • PROSTATECTOMY, RADIAL  .    prostate cancer.   • APPENDECTOMY     • MITRAL VALVE REPLACE      S/P lisa Clip X1   • OTHER CARDIAC SURGERY   and     aVR. Thoracic aortic aneurysm repair.     Family History   Problem Relation Age of Onset   • Other Mother          at age 82. Parkinson's disease.   • Cancer Father          at age 68. Prostate cancer.   • Cancer Sister         Lung   • Heart Disease Neg Hx      Social History     Social History   • Marital status:      Spouse name: N/A   • Number of children: N/A   • Years of education: N/A     Occupational History   • Not on file.     Social History Main Topics   • Smoking status: Never Smoker   • Smokeless tobacco: Never Used   • Alcohol use Yes      Comment: 1/day   • Drug use: No   • Sexual activity: Yes     Partners: Female     Other Topics Concern   • Not on file     Social History Narrative    Retired . . Moved to [place] in .     No Known Allergies     Medications reviewed.    Outpatient Encounter Prescriptions as of 2019   Medication Sig Dispense Refill   • ipratropium (ATROVENT) 0.06 % Solution Spray 2 Sprays in nose 3 times a day. 1 Bottle 2   • albuterol 108 (90 Base) MCG/ACT Aero Soln inhalation aerosol Inhale 2 Puffs by mouth every 6 hours as needed for Shortness of Breath. 8.5 g 2   • fluticasone (FLONASE) 50 MCG/ACT nasal spray Spray 2 Sprays in nose 1 time daily as needed.     • metoprolol SR (TOPROL XL) 25 MG TABLET SR 24 HR TAKE 1 TABLET BY MOUTH 1/2  HOUR AFTER DINNER AS  DIRECTED 90 Tab 3   • omeprazole (PRILOSEC) 20 MG delayed-release capsule Take 1 Cap by mouth every day. 90 Cap 3   • losartan (COZAAR) 50 MG Tab Take 1 Tab by mouth every day. 90 Tab 3   • Cholecalciferol (VITAMIN D) 400 UNIT TABS Take 400 Units by mouth every day.     • DiphenhydrAMINE HCl (BENADRYL  "ALLERGY PO) Take 1 Each by mouth at bedtime as needed. As needed for sleep      • aspirin 81 MG tablet Take 81 mg by mouth every evening.       No facility-administered encounter medications on file as of 5/30/2019.      Review of Systems   Constitutional: Negative.  Negative for chills, fever, malaise/fatigue and weight loss.   HENT: Negative.  Negative for hearing loss.    Eyes: Negative.  Negative for blurred vision and double vision.   Respiratory: Negative.  Negative for cough and shortness of breath.    Cardiovascular: Negative.  Negative for chest pain, palpitations, claudication and leg swelling.   Gastrointestinal: Negative.  Negative for abdominal pain, nausea and vomiting.   Genitourinary: Negative.  Negative for dysuria and urgency.   Musculoskeletal: Negative.  Negative for joint pain and myalgias.   Skin: Negative.  Negative for itching and rash.   Neurological: Negative.  Negative for dizziness, focal weakness, weakness and headaches.   Endo/Heme/Allergies: Negative.  Does not bruise/bleed easily.   Psychiatric/Behavioral: Negative.  Negative for depression. The patient is not nervous/anxious.         Objective:   /72 (BP Location: Left arm, Patient Position: Sitting, BP Cuff Size: Adult)   Pulse 75   Ht 1.88 m (6' 2\")   Wt 88.4 kg (194 lb 14.2 oz)   SpO2 96%   BMI 25.02 kg/m²     Physical Exam   Constitutional: He is oriented to person, place, and time. He appears well-developed and well-nourished.   HENT:   Head: Normocephalic and atraumatic.   Eyes: EOM are normal.   Neck: No JVD present.   Cardiovascular: Normal rate, regular rhythm and normal heart sounds.    Pulmonary/Chest: Effort normal and breath sounds normal.   Abdominal: Soft. Bowel sounds are normal.   No hepatosplenomegaly.   Musculoskeletal: Normal range of motion.   Lymphadenopathy:     He has no cervical adenopathy.   Neurological: He is alert and oriented to person, place, and time.   Skin: Skin is warm and dry. "   Psychiatric: He has a normal mood and affect.     CARDIAC STUDIES/PROCEDURES:     CARDIAC CATHETERIZATION CONCLUSIONS by Dr. Conte (03/12/19)  1.  Severe mitral regurgitation.  2.  Left ventricular ejection fraction 61% with normal wall motion.  3.  Coronary arteries are angiographically normal.  4.  Normal pulmonary pressures.  5.  Elevated bioprosthetic aortic valve pullback gradient.  6.  Elevated LVEDP.    ECHOCARDIOGRAM CONCLUSIONS (05/27/19)  Prior study done 4/23/19 - compared to the report of the study done - there has been no significant change.   Normal left ventricular systolic function.  Left ventricular ejection fraction is visually estimated to be 65%.  Pacer/ICD wire seen in right ventricle.  Known transcatheter mitral valve repair which is functioning normally with appropriate transvalvular gradient.  Mean transvalvular gradient is 2 mmHg   Trace mitral regurgitation.  Normal function of bioprosthetic valve which is working well.  Vmax is  2.40 m/s. Transvalvular gradients are - Peak: 23 mmHg, Mean: 11 mmHg.   Right ventricular systolic pressure is estimated to be 20 mmHg.  (study result reviewed)     ECHOCARDIOGRAM CONCLUSIONS (04/23/19)  Normal left ventricular systolic function and regional wall motion.  Left ventricular ejection fraction is visually estimated to be 60%.  The right ventricle was normal in size and function.  Post MITRACLIP repair.  Mitral regurgitation present.  Known bioprosthetic aortic valve that is functioning normally with   normal transvalvular gradients.  Compared to the images of the study done  10/17/18 - there has been   interval placement of a MITRACLIP.  (study result reviewed)     ECHOCARDIOGRAM CONCLUSIONS (10/17/18)  Normal left ventricular size and systolic function.  Left ventricular ejection fraction is visually estimated to be 65%.  Mild concentric left ventricular hypertrophy.  Known bioprosthetic aortic valve that is functioning normally with   normal  transvalvular gradients.  Myxomatous mitral valve disease.  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  Severe mitral regurgitation.  Pacer/ICD wire seen in right ventricle.     EKG performed on (04/26/19) was reviewed: EKG shows paced rhythm.  EKG performed on (04/23/19) EKG shows sinus rhythm with right bundle branch block.  EKG performed on (03/11/19) EKG shows paced rhythm.     Laboratory results of (04/23/19) were reviewed. Bun of 26 mg/dl, creatinine levels of 1.05 mg/dl noted.     TRANSESOPHAGEAL ECHOCARDIOGRAM CONCLUSIONS by Dr. Conte (02/12/19)  The left ventricle was normal in size and function.  Left ventricular ejection fraction is visually estimated to be 70%.  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  Severe mitral regurgitation.  Known bioprosthetic aortic valve that is functioning normally.     TRANSESOPHAGEAL ECHOCARDIOGRAM CONCLUSIONS by Dr. Samaniego (04/22/19)  Intraoperative TRAVIS during mitraclip procedure.  Baseline images show   normal biventricular function with EF = 55%.  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  Severe mitral   regurgitation.  Single mitraclip deployed at A3/P3 interface with mild   residual MR, mean gradient of 1 mm Hg, and no evidence of complication.    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  No   evidence of complication.    (study result reviewed)    Assessment:     1. S/P mitral valve clip implantation     2. S/P AVR (aortic valve replacement)     3. S/P thoracic aortic aneurysm repair       Medical Decision Making:  Today's Assessment / Status / Plan:     1. Successful transcatheter percutaneous mitral valve repair (MitraClip) with 1 clip (04/22/19): He is clinically doing well, NYHA class I. We will repeat an echocardiogram in one year.  2. History of surgery for bicuspid  aortic valve with homograft valve at [facility] in 2000 and redo aortic valve replacement and ascending aortic aneurysm repair with #23 mm bioprosthetic valve and 28 mm tube graft at [facility] in 02/21/2013.  3. [employer] pacemaker placement by Jam Escobar (12/29/14): The pacemake is functioning well.     Greater than 20 minutes were spent counseling and coordination of care, discussing cardiac issues, medication review. Lab review, study review as mentioned in the body of the report, assessment and plan. We will follow up with her in one year from MitraClip standpoint and also have her follow up with her primary cardiologist, Dr. Conte.    CC Rashard Martin and Scottie Chang

## 2019-06-06 ENCOUNTER — TELEPHONE (OUTPATIENT)
Dept: CARDIOLOGY | Facility: MEDICAL CENTER | Age: 79
End: 2019-06-06

## 2019-06-06 NOTE — TELEPHONE ENCOUNTER
Valve Program Functional Assessment: 19 1 month post-op     [id number]   1a) Showering/bathin  1b) Walking 1 block on ground: 5  1c) Hurrying or joggin  2) Swellin  3) Fatigue: 5  4) Shortness of breath: 6  5) Sleep sitting up: 5  6) Limited enjoyment of life: 5  7) Spend the rest of your life with HF: 4  8a) Hobbies, recreational activities:5  8b) Working or doing household chores:5  8c) Visiting family or friends: 5    5 meter walk test not completed     Strength   1) _45_____ kg  2) _45_____ kg  3) _45_____ kg    BOBBY ADLs  Patient independently preforms...   - Bathing: Yes   - Dressing: Yes   - Toileting: Yes   - Transferring: Yes   - Continence: Yes   - Feeding: Yes     Living Situation  Patient lives: with spouse    Mobility Aids   Patient uses: none

## 2019-08-19 RX ORDER — LOSARTAN POTASSIUM 50 MG/1
50 TABLET ORAL
Qty: 90 TAB | Refills: 3 | Status: SHIPPED | OUTPATIENT
Start: 2019-08-19 | End: 2019-10-07

## 2019-08-19 RX ORDER — METOPROLOL SUCCINATE 25 MG/1
TABLET, EXTENDED RELEASE ORAL
Qty: 90 TAB | Refills: 3 | Status: SHIPPED | OUTPATIENT
Start: 2019-08-19 | End: 2020-09-16

## 2019-08-19 RX ORDER — OMEPRAZOLE 20 MG/1
20 CAPSULE, DELAYED RELEASE ORAL
Qty: 90 CAP | Refills: 3 | Status: SHIPPED | OUTPATIENT
Start: 2019-08-19 | End: 2020-07-10

## 2019-09-10 ENCOUNTER — NON-PROVIDER VISIT (OUTPATIENT)
Dept: CARDIOLOGY | Facility: MEDICAL CENTER | Age: 79
End: 2019-09-10
Payer: MEDICARE

## 2019-09-10 DIAGNOSIS — Z95.0 BIVENTRICULAR CARDIAC PACEMAKER IN SITU: ICD-10-CM

## 2019-09-10 PROCEDURE — 93281 PM DEVICE PROGR EVAL MULTI: CPT | Performed by: INTERNAL MEDICINE

## 2019-09-17 ENCOUNTER — OFFICE VISIT (OUTPATIENT)
Dept: MEDICAL GROUP | Facility: MEDICAL CENTER | Age: 79
End: 2019-09-17
Payer: MEDICARE

## 2019-09-17 VITALS
OXYGEN SATURATION: 95 % | DIASTOLIC BLOOD PRESSURE: 74 MMHG | SYSTOLIC BLOOD PRESSURE: 128 MMHG | WEIGHT: 195 LBS | HEIGHT: 74 IN | HEART RATE: 74 BPM | BODY MASS INDEX: 25.03 KG/M2 | TEMPERATURE: 98.2 F

## 2019-09-17 DIAGNOSIS — J01.00 ACUTE NON-RECURRENT MAXILLARY SINUSITIS: ICD-10-CM

## 2019-09-17 PROCEDURE — 99214 OFFICE O/P EST MOD 30 MIN: CPT | Performed by: FAMILY MEDICINE

## 2019-09-17 RX ORDER — AZITHROMYCIN 250 MG/1
TABLET, FILM COATED ORAL
Qty: 6 TAB | Refills: 0 | Status: SHIPPED | OUTPATIENT
Start: 2019-09-17 | End: 2019-09-19

## 2019-09-17 ASSESSMENT — PATIENT HEALTH QUESTIONNAIRE - PHQ9: CLINICAL INTERPRETATION OF PHQ2 SCORE: 0

## 2019-09-17 NOTE — PROGRESS NOTES
Subjective:   Ross Mcneal is a 79 y.o. male here today for sinusitis    For the last several days has been having a lot of swelling in his nose. Controlling symptoms with Sudafed during the day and Afrin at night, also using Flonase every morning. He is not getting any sinus drainage. There is bilateral sinus pain and swelling. He is going out of town for 2 weeks in a few days to [place]. Has been taking Zyrtec all week. Today is slightly better than previous.    Current medicines (including changes today)  Current Outpatient Medications   Medication Sig Dispense Refill   • metoprolol SR (TOPROL XL) 25 MG TABLET SR 24 HR TAKE 1 TABLET BY MOUTH 1/2  HOUR AFTER DINNER AS  DIRECTED 90 Tab 3   • losartan (COZAAR) 50 MG Tab Take 1 Tab by mouth every day. 90 Tab 3   • omeprazole (PRILOSEC) 20 MG delayed-release capsule Take 1 Cap by mouth every day. 90 Cap 3   • ipratropium (ATROVENT) 0.06 % Solution Spray 2 Sprays in nose 3 times a day. 1 Bottle 2   • albuterol 108 (90 Base) MCG/ACT Aero Soln inhalation aerosol Inhale 2 Puffs by mouth every 6 hours as needed for Shortness of Breath. 8.5 g 2   • fluticasone (FLONASE) 50 MCG/ACT nasal spray Spray 2 Sprays in nose 1 time daily as needed.     • Cholecalciferol (VITAMIN D) 400 UNIT TABS Take 400 Units by mouth every day.     • DiphenhydrAMINE HCl (BENADRYL ALLERGY PO) Take 1 Each by mouth at bedtime as needed. As needed for sleep      • aspirin 81 MG tablet Take 81 mg by mouth every evening.       No current facility-administered medications for this visit.      He  has a past medical history of Arthritis, Basal cell carcinoma of lip (11/27/2013), Biventricular cardiac pacemaker in situ (3/4/2015), Breath shortness, Cancer ([facility]), GERD (gastroesophageal reflux disease) (10/17/2013), Heart burn, Hypertension ( ), Indigestion, Insomnia (10/17/2013), LBBB (left bundle branch block) ( ), MEDICAL HOME, Pacemaker, Prostate cancer ([facility]) (2003), PVCs (premature ventricular  "contractions) (3/4/2015), S/P AVR (aortic valve replacement) (2/21/2013), S/P prostatectomy (2003), S/P thoracic aortic aneurysm repair (2/21/2013), Seasonal allergies (10/17/2013), and Valvular heart disease.    ROS   No fever, no ear pain       Objective:     /74 (BP Location: Right arm, Patient Position: Sitting)   Pulse 74   Temp 36.8 °C (98.2 °F)   Ht 1.88 m (6' 2\")   Wt 88.5 kg (195 lb)   SpO2 95%  Body mass index is 25.04 kg/m².   Physical Exam:  Constitutional: Alert, no distress.  Skin: Warm, dry, good turgor, no rashes in visible areas.  Eye: Equal, round and reactive, conjunctiva clear, lids normal.  ENMT: Lips without lesions, good dentition, oropharynx clear. TMs pearly gray bilaterally.  Neck: Trachea midline, no masses, no thyromegaly. No cervical or supraclavicular lymphadenopathy  Respiratory: Unlabored respiratory effort, lungs clear to auscultation, no wheezes, no ronchi.  Psych: Alert and oriented x3, normal affect and mood.        Assessment and Plan:   The following treatment plan was discussed    1. Acute non-recurrent maxillary sinusitis  New problem.  Advised patient to discontinue Afrin and to increase Flonase.  Prescription for Z-Aleksandar and discussed situations were Z-Aleksandar would be needed.  - azithromycin (ZITHROMAX) 250 MG Tab; 2 tabs by mouth day 1, 1 tab by mouth days 2-5  Dispense: 6 Tab; Refill: 0        Followup: Return if symptoms worsen or fail to improve.         "

## 2019-09-19 ENCOUNTER — OFFICE VISIT (OUTPATIENT)
Dept: CARDIOLOGY | Facility: MEDICAL CENTER | Age: 79
End: 2019-09-19
Payer: MEDICARE

## 2019-09-19 VITALS
HEIGHT: 74 IN | WEIGHT: 193 LBS | DIASTOLIC BLOOD PRESSURE: 70 MMHG | OXYGEN SATURATION: 96 % | HEART RATE: 50 BPM | SYSTOLIC BLOOD PRESSURE: 130 MMHG | BODY MASS INDEX: 24.77 KG/M2

## 2019-09-19 DIAGNOSIS — Z95.0 BIVENTRICULAR CARDIAC PACEMAKER IN SITU: ICD-10-CM

## 2019-09-19 DIAGNOSIS — Z95.818 S/P MITRAL VALVE CLIP IMPLANTATION: ICD-10-CM

## 2019-09-19 DIAGNOSIS — I49.3 PVCS (PREMATURE VENTRICULAR CONTRACTIONS): ICD-10-CM

## 2019-09-19 DIAGNOSIS — I44.7 LBBB (LEFT BUNDLE BRANCH BLOCK): ICD-10-CM

## 2019-09-19 DIAGNOSIS — Z95.2 S/P AVR (AORTIC VALVE REPLACEMENT): ICD-10-CM

## 2019-09-19 DIAGNOSIS — Z98.890 S/P MITRAL VALVE CLIP IMPLANTATION: ICD-10-CM

## 2019-09-19 PROCEDURE — 99214 OFFICE O/P EST MOD 30 MIN: CPT | Performed by: INTERNAL MEDICINE

## 2019-09-19 ASSESSMENT — ENCOUNTER SYMPTOMS
SHORTNESS OF BREATH: 0
PND: 0
COUGH: 0
ORTHOPNEA: 0
DIZZINESS: 0
PALPITATIONS: 0
LOSS OF CONSCIOUSNESS: 0
MYALGIAS: 0

## 2019-09-19 NOTE — PROGRESS NOTES
Chief Complaint   Patient presents with   •   Mitral Regurgitation     Follow up       Subjective:   Ross Mcneal is a 79 y.o. male who presents today for followup for review of TRAVIS for myxomatous mitral valve disease and mitral regurgitation with prior history of nonischemic cardiomyopathy, hypertension, BV permanent pacemaker, aortic valve disease, status post aortic valve replacement and ascending aortic aneurysm repair, and ventricular ectopy.    Last seen 5/30/2019 by Dr Ramirez.     Since 5/30/2019 the patient has had no cardiac problems.  Underwent MitraClip procedure 4/2019 without difficulty.  Planning a 2-week trip to [place] with his wife.    Since 9/27/2018 appointment the patient reports exertional shortness of breath and chest discomfort.  No PND, orthopnea, edema or palpitations.    Since 5/4/2018 the patient has had no cardiac symptoms.  The patient and his wife became ill after returning from their first trip from [facility].  Had respiratory problems and required 2 courses of antibiotics the last one being completed just a week ago.  The patient feels that he is recovering with some minimal fatigue.     Since 10/12/2017 the patient has had no cardiac symptoms.  No heart failure symptoms, palpitations or dizziness.  AICD device functioning normally.     Since 3/16/2017 appointment the patient's had no cardiac symptoms.  Compliant with his medications.     Since 9/15/2016 appointment no cardiac symptoms.  Continues to stay physically active without any difficulty.  Plans a cruise in April.     Between 12/29/2014-12/30/2014 [facility].  Successfully underwent biventricular pacemaker implantation by Dr. Jam Lacy.  Indications were syncope/collapse with first degree AV block and a left bundle branch block.  Has recovered well.  Biggest improvement the patient states that first, no more syncope second, no more positional dizziness, lightheadedness or near syncope  "when he gets up from a sitting position.  Has had chronic palpitations with skipping beats.  Has not changed.  Usually picks up on his blood pressure monitored.     Monitors his blood pressure daily and his blood pressure log indicates excellent control of his blood pressure.     Past Medical History  November, 2014 while in [place] at a conference, the patient was walking down the hotel hallway carrying some milk and the next thing he remembers he was on the floor looking up.  Had no injury.  No warning symptoms.  Did not seek medical attention.  After returning home he noticed his blood pressure was elevated at 185 systolic.  Contacted the on-call doctor. Diovan was increased from 80 to 160 mg daily in addition to HCTZ.  He has had some positional lightheadedness.  His carefully monitored his blood pressure.     The patient his wife recently moved to [place] in October, 2012.  The patient went back to [place], [place] undergo redo AVR and ascending aortic aneurysm repair at [facility] in [facility] on 2/21/2013.  Preoperative cardiac catheterization demonstrated that his coronary arteries \"were all patent\".  Postoperatively as well.  Had postoperative atrial fibrillation on amiodarone which as been discontinued.       Past Medical History:   Diagnosis Date   • Arthritis     hands   • Basal cell carcinoma of lip 11/27/2013   • Biventricular cardiac pacemaker in situ 3/4/2015   • Breath shortness     with exertion   • Cancer (HCC)     prostate   • GERD (gastroesophageal reflux disease) 10/17/2013   • Heart burn    • Hypertension     • Indigestion    • Insomnia 10/17/2013   • LBBB (left bundle branch block)     • MEDICAL HOME    • Pacemaker    • Prostate cancer (HCC) 2003   • PVCs (premature ventricular contractions) 3/4/2015   • S/P AVR (aortic valve replacement) 2/21/2013    redo; [place], [place]   • S/P prostatectomy 2003   • S/P thoracic aortic " aneurysm repair 2013   • Seasonal allergies 10/17/2013   • Valvular heart disease      Past Surgical History:   Procedure Laterality Date   • TRANSCATHETER MITRAL VALVE REPAIR Right 2019    Procedure: REPAIR, MITRAL VALVE, TRANSCATHETER- AND ANY ASSOCIATED PROCEDURES INCLUDING ARTAIL SEPTAL DEFECT CLOSURE AND PERMANENT PACE MAKER PLACEMENT;  Surgeon: Neto Ramirez M.D.;  Location: SURGERY [facility];  Service: Cardiac   • TRAVIS  2019    Procedure: ECHOCARDIOGRAM, TRANSESOPHAGEAL;  Surgeon: Neto Ramirez M.D.;  Location: SURGERY [facility];  Service: Cardiac   • RECOVERY  2014    Performed by Cath-Recovery Surgery at SURGERY SAME DAY [facility]   • AORTIC VALVE REPLACEMENT  13    Bovine tissur valve with ascending aortic repair..  Done in [place].   • [facility] CARDIAC CATH  2013    normal coronaries prior to AVR.   • OTHER      carcinoma removed from lip   • PROSTATECTOMY, RADIAL  .    prostate cancer.   • APPENDECTOMY     • MITRAL VALVE REPLACE      S/P lisa Clip X1   • OTHER CARDIAC SURGERY   and     aVR. Thoracic aortic aneurysm repair.     Family History   Problem Relation Age of Onset   • Other Mother          at age 82. Parkinson's disease.   • Cancer Father          at age 68. Prostate cancer.   • Cancer Sister         Lung   • Heart Disease Neg Hx      Social History     Socioeconomic History   • Marital status:      Spouse name: Not on file   • Number of children: Not on file   • Years of education: Not on file   • Highest education level: Not on file   Occupational History   • Not on file   Social Needs   • Financial resource strain: Not on file   • Food insecurity:     Worry: Not on file     Inability: Not on file   • Transportation needs:     Medical: Not on file     Non-medical: Not on file   Tobacco Use   • Smoking status: Never Smoker   • Smokeless tobacco: Never Used   Substance and Sexual Activity   • Alcohol use: Yes     Comment:  1/day   • Drug use: No   • Sexual activity: Yes     Partners: Female   Lifestyle   • Physical activity:     Days per week: Not on file     Minutes per session: Not on file   • Stress: Not on file   Relationships   • Social connections:     Talks on phone: Not on file     Gets together: Not on file     Attends Yarsani service: Not on file     Active member of club or organization: Not on file     Attends meetings of clubs or organizations: Not on file     Relationship status: Not on file   • Intimate partner violence:     Fear of current or ex partner: Not on file     Emotionally abused: Not on file     Physically abused: Not on file     Forced sexual activity: Not on file   Other Topics Concern   • Not on file   Social History Narrative    Retired . . Moved to [place] in October, 2012.     No Known Allergies  Outpatient Encounter Medications as of 9/19/2019   Medication Sig Dispense Refill   • metoprolol SR (TOPROL XL) 25 MG TABLET SR 24 HR TAKE 1 TABLET BY MOUTH 1/2  HOUR AFTER DINNER AS  DIRECTED 90 Tab 3   • losartan (COZAAR) 50 MG Tab Take 1 Tab by mouth every day. 90 Tab 3   • omeprazole (PRILOSEC) 20 MG delayed-release capsule Take 1 Cap by mouth every day. 90 Cap 3   • fluticasone (FLONASE) 50 MCG/ACT nasal spray Spray 2 Sprays in nose 1 time daily as needed.     • Cholecalciferol (VITAMIN D) 400 UNIT TABS Take 400 Units by mouth every day.     • DiphenhydrAMINE HCl (BENADRYL ALLERGY PO) Take 1 Each by mouth at bedtime as needed. As needed for sleep      • aspirin 81 MG tablet Take 81 mg by mouth every evening.     • [DISCONTINUED] azithromycin (ZITHROMAX) 250 MG Tab 2 tabs by mouth day 1, 1 tab by mouth days 2-5 (Patient not taking: Reported on 9/19/2019) 6 Tab 0   • [DISCONTINUED] ipratropium (ATROVENT) 0.06 % Solution Spray 2 Sprays in nose 3 times a day. (Patient not taking: Reported on 9/19/2019) 1 Bottle 2   • [DISCONTINUED] albuterol 108 (90 Base) MCG/ACT Aero Soln inhalation aerosol  "Inhale 2 Puffs by mouth every 6 hours as needed for Shortness of Breath. (Patient not taking: Reported on 9/19/2019) 8.5 g 2     No facility-administered encounter medications on file as of 9/19/2019.      Review of Systems   Respiratory: Negative for cough and shortness of breath.    Cardiovascular: Negative for chest pain, palpitations, orthopnea, leg swelling and PND.   Musculoskeletal: Negative for myalgias.   Neurological: Negative for dizziness and loss of consciousness.        Objective:   /70 (BP Location: Left arm, Patient Position: Sitting, BP Cuff Size: Adult)   Pulse (!) 50   Ht 1.88 m (6' 2\")   Wt 87.5 kg (193 lb)   SpO2 96%   BMI 24.78 kg/m²     Physical Exam   Constitutional: He is oriented to person, place, and time. He appears well-developed and well-nourished. No distress.   Neck: No JVD present.   Cardiovascular: Normal rate, regular rhythm and intact distal pulses. Exam reveals no gallop and no friction rub.   Murmur heard.  Pulmonary/Chest: Effort normal and breath sounds normal. No respiratory distress. He has no wheezes. He has no rales.   Midline sternal scar.  Pacemaker generator in place.   Abdominal: Soft. He exhibits no distension and no mass. There is no tenderness.   Musculoskeletal: He exhibits no edema.   Neurological: He is alert and oriented to person, place, and time.   Skin: Skin is warm and dry.   Psychiatric: He has a normal mood and affect. His behavior is normal.     02/27/2013 ECHOCARDIOGRAM  EF 30-40%. Aortic valve prosthesis. 1-2+ MR. 2+ TR.     12/12/2014 ECHOCARDIOGRAM  Pt has a prosthetic aortic valve with normal motion.  Transvalvular gradients are - Peak: 20 mmHg Mean: 8 mmHg.  Myxomatous change with prolapse of the both mitral leaflet was present.  Eccentric, more than one jets of mild to moderate mitral regurgitation.  Mild to moderately dilated left atrium.  Normal left ventricular chamber size.  Mild concentric left ventricular hypertrophy.  Difficult to " determine systolic function due to irregular heart rhythm   but appeared preserved.  Septal bounces probably from conduction abnormality (LBBB).  Normal right ventricular systolic function.  Unable to estimate pulmonary artery pressure due to an inadequate   tricuspid regurgitant jet.     11/11/2015 ECHOCARDIOGRAM  Bovine bioprosthetic aortic valve; normal function.  Normal left ventricular systolic function.  Left ventricular ejection fraction is visually estimated to be 70%.  Asymmetric septal hypertrophy.  Mitral valve prolapse of anterior and posterior leaflets with   myxomatous changes.  Moderate eccentric mitral regurgitation.  Right heart pressures are normal.     11/01/2017 ECHOCARDIOGRAM   Normal left   ventricular systolic function.  Left ventricular ejection fraction is visually estimated to be 70%.  Myxomatous mitral valve leaflet thickening with prolapse of the   anterior and posterior leaflets.  Severe eccentric mitral regurgitation.  Normal function of mechanical prosthetic valve.  Right heart pressures are normal.  Pacer/ICD wire seen in right ventricle.     02/12/2019 TRAVIS  The left ventricle was normal in size and function.  Left ventricular ejection fraction is visually estimated to be 70%.  Myxomatous mitral valve with prolapse of both the anterior and   posterior leaflets.  Severe mitral regurgitation.  Known bioprosthetic aortic valve that is functioning normally.    05/27/2019 ECHOCARDIOGRAMPrior study done 4/23/19 - compared to the report of the study done -   there has been no significant change.   Normal left ventricular systolic function.  Left ventricular ejection fraction is visually estimated to be 65%.  Pacer/ICD wire seen in right ventricle.  Known transcatheter mitral valve repair which is functioning normally   with appropriate transvalvular gradient.  Mean transvalvular gradient is 2 mmHg   Normal function of bioprosthetic valve which is working well.  Vmax is  2.40 m/s.  Transvalvular gradients are - Peak: 23 mmHg, Mean:   11 mmHg.   Right ventricular systolic pressure is estimated to be 20 mmHg.       /08/2013 EKG: Normal sinus rhythm, rate 67. First degree AV block. Left bundle branch block. Unchanged since 2/22/2013.     12/08/2014 EKG: Normal sinus rhythm, rate 92. First degree AV block. Left bundle branch block. Intermittent left anterior hemiblock. Isolated PVCs.    Assessment:     1. S/P mitral valve clip implantation     2. S/P AVR (aortic valve replacement)     3. Biventricular cardiac pacemaker in situ     4. LBBB (left bundle branch block)     5. PVCs (premature ventricular contractions)         Medical Decision Making:  Today's Assessment / Status / Plan:   Assessment    S/P MitraClip 4/2019.    Myxomatous mitral valve disease.    S/P redo AVR 23 mm composite pericardial tissue valve ascending aortic aneurysm repair 28 mm Valsalva sleeve graft. 2/21/2013. [facility].  Degenerated homograft AVR.  Severe aortic insufficiency. Clinically stable.     AVR.  Homograft.  2000. Critical aortic stenosis.  [place], [place]    Biventricular pacemaker. 12/29/2014. Syncope unknown etiology. Functioning normally.     Hypertension. Blood pressure controlled on outpatient measurements.     Cardiomyopathy. Resolved post biventricular AICD.     Syncope. 2014. No recurrence.     Palpitations. Chronic. Related to PVCs now controlled.     PVCs. Chronic.     LBBB. Chronic.     Recommendation/Discussion  1.   The patient's current cardiac status is stable.  2.  Continue current cardiac therapy.  3.  Follow-up 6 months.

## 2019-10-01 LAB
LV EJECT FRACT  99904: 60
LV EJECT FRACT MOD 2C 99903: 72.12
LV EJECT FRACT MOD 4C 99902: 59.31
LV EJECT FRACT MOD BP 99901: 64.16

## 2019-10-05 ENCOUNTER — PATIENT MESSAGE (OUTPATIENT)
Dept: MEDICAL GROUP | Facility: MEDICAL CENTER | Age: 79
End: 2019-10-05

## 2019-10-07 RX ORDER — VALSARTAN 160 MG/1
160 TABLET ORAL DAILY
Qty: 90 TAB | Refills: 3 | Status: SHIPPED | OUTPATIENT
Start: 2019-10-07 | End: 2020-08-17

## 2019-10-07 NOTE — TELEPHONE ENCOUNTER
From: Ross Mcneal  To: Scottie Packer M.D.  Sent: 10/5/2019 7:27 AM PDT  Subject: Prescription Question    I have received information from [facility], my mail prescription service, that Losartan has been recalled by the FDA and that I should be finding an alternative medication. Do you know about this? What is the alternative?  Ross Mcneal

## 2020-03-11 ENCOUNTER — NON-PROVIDER VISIT (OUTPATIENT)
Dept: CARDIOLOGY | Facility: MEDICAL CENTER | Age: 80
End: 2020-03-11
Payer: MEDICARE

## 2020-03-11 DIAGNOSIS — Z95.0 BIVENTRICULAR CARDIAC PACEMAKER IN SITU: ICD-10-CM

## 2020-03-11 PROCEDURE — 93281 PM DEVICE PROGR EVAL MULTI: CPT | Performed by: INTERNAL MEDICINE

## 2020-04-01 ENCOUNTER — TELEPHONE (OUTPATIENT)
Dept: CARDIOLOGY | Facility: MEDICAL CENTER | Age: 80
End: 2020-04-01

## 2020-04-01 ENCOUNTER — HOSPITAL ENCOUNTER (OUTPATIENT)
Dept: CARDIOLOGY | Facility: MEDICAL CENTER | Age: 80
End: 2020-04-01
Attending: INTERNAL MEDICINE
Payer: MEDICARE

## 2020-04-01 DIAGNOSIS — Z95.818 STATUS POST IMPLANTATION OF MITRAL VALVE LEAFLET CLIP: ICD-10-CM

## 2020-04-01 DIAGNOSIS — Z98.890 STATUS POST IMPLANTATION OF MITRAL VALVE LEAFLET CLIP: ICD-10-CM

## 2020-04-01 LAB
LV EJECT FRACT  99904: 65
LV EJECT FRACT MOD 2C 99903: 64.19
LV EJECT FRACT MOD 4C 99902: 78.95
LV EJECT FRACT MOD BP 99901: 76.12

## 2020-04-01 PROCEDURE — 93306 TTE W/DOPPLER COMPLETE: CPT

## 2020-04-01 PROCEDURE — 93306 TTE W/DOPPLER COMPLETE: CPT | Mod: 26 | Performed by: INTERNAL MEDICINE

## 2020-04-02 NOTE — TELEPHONE ENCOUNTER
Message   Received: Today   Message Contents   GENE Miller R.N.             Please call patient with unremarkable echocardiogram results showing normally functioning MitraClip results with mild mitral regurgitation.     Thanks.  RANJANA      --------------------------------------------------------    colleenharalecia message sent to pt regarding results

## 2020-04-14 ENCOUNTER — TELEMEDICINE (OUTPATIENT)
Dept: CARDIOLOGY | Facility: MEDICAL CENTER | Age: 80
End: 2020-04-14
Payer: MEDICARE

## 2020-04-14 VITALS
BODY MASS INDEX: 24 KG/M2 | HEIGHT: 75 IN | DIASTOLIC BLOOD PRESSURE: 74 MMHG | SYSTOLIC BLOOD PRESSURE: 124 MMHG | WEIGHT: 193 LBS | HEART RATE: 75 BPM

## 2020-04-14 DIAGNOSIS — Z98.890 S/P MITRAL VALVE CLIP IMPLANTATION: ICD-10-CM

## 2020-04-14 DIAGNOSIS — I49.3 PVCS (PREMATURE VENTRICULAR CONTRACTIONS): ICD-10-CM

## 2020-04-14 DIAGNOSIS — Z95.818 S/P MITRAL VALVE CLIP IMPLANTATION: ICD-10-CM

## 2020-04-14 DIAGNOSIS — Z95.2 S/P AVR (AORTIC VALVE REPLACEMENT): ICD-10-CM

## 2020-04-14 DIAGNOSIS — I44.7 LBBB (LEFT BUNDLE BRANCH BLOCK): ICD-10-CM

## 2020-04-14 DIAGNOSIS — I10 ESSENTIAL HYPERTENSION, BENIGN: ICD-10-CM

## 2020-04-14 DIAGNOSIS — Z95.0 BIVENTRICULAR CARDIAC PACEMAKER IN SITU: ICD-10-CM

## 2020-04-14 DIAGNOSIS — Z86.79 S/P THORACIC AORTIC ANEURYSM REPAIR: ICD-10-CM

## 2020-04-14 DIAGNOSIS — Z98.890 S/P THORACIC AORTIC ANEURYSM REPAIR: ICD-10-CM

## 2020-04-14 PROCEDURE — 99214 OFFICE O/P EST MOD 30 MIN: CPT | Performed by: INTERNAL MEDICINE

## 2020-04-14 ASSESSMENT — FIBROSIS 4 INDEX: FIB4 SCORE: 3.3

## 2020-04-14 ASSESSMENT — ENCOUNTER SYMPTOMS
PND: 0
SHORTNESS OF BREATH: 0
DIZZINESS: 0
ORTHOPNEA: 0
COUGH: 0
LOSS OF CONSCIOUSNESS: 0
PALPITATIONS: 0
MYALGIAS: 0

## 2020-04-14 NOTE — PROGRESS NOTES
Chief Complaint   Patient presents with   •   Mitral Regurgitation     Follow up       Subjective:   Ross Mcneal is a 79 y.o. male who presents today for followup nonischemic cardiomyopathy, hypertension, BV permanent pacemaker with recovered EF, LBBB, aortic valve disease, status post aortic valve replacement and ascending aortic aneurysm repair, MitraClip and ventricular ectopy.    Telemedicine encounter, Zoom connection  This encounter was conducted via Zoom .   Verbal consent was obtained. Patient's identity was verified.    Last seen 9/19/2019.    Since 9/19/2019 the patient has had no cardiac problems or symptoms.  Losartan was switched to valsartan by Dr. Scottie Packer apparently for contamination issues.  No palpitations.    Since 5/30/2019 the patient has had no cardiac problems.  Underwent MitraClip procedure 4/2019 without difficulty.  Planning a 2-week trip to [place] with his wife.    Since 9/27/2018 appointment the patient reports exertional shortness of breath and chest discomfort.  No PND, orthopnea, edema or palpitations.    Since 5/4/2018 the patient has had no cardiac symptoms.  The patient and his wife became ill after returning from their first trip from [facility].  Had respiratory problems and required 2 courses of antibiotics the last one being completed just a week ago.  The patient feels that he is recovering with some minimal fatigue.     Since 10/12/2017 the patient has had no cardiac symptoms.  No heart failure symptoms, palpitations or dizziness.  AICD device functioning normally.     Since 3/16/2017 appointment the patient's had no cardiac symptoms.  Compliant with his medications.     Since 9/15/2016 appointment no cardiac symptoms.  Continues to stay physically active without any difficulty.  Plans a cruise in April.     Between 12/29/2014-12/30/2014 [facility].  Successfully underwent biventricular pacemaker implantation by Dr. Polk  "Regino.  Indications were syncope/collapse with first degree AV block and a left bundle branch block.  Has recovered well.  Biggest improvement the patient states that first, no more syncope second, no more positional dizziness, lightheadedness or near syncope when he gets up from a sitting position.  Has had chronic palpitations with skipping beats.  Has not changed.  Usually picks up on his blood pressure monitored.     Monitors his blood pressure daily and his blood pressure log indicates excellent control of his blood pressure.     Past Medical History  November, 2014 while in [place] at a conference, the patient was walking down the hotel hallway carrying some milk and the next thing he remembers he was on the floor looking up.  Had no injury.  No warning symptoms.  Did not seek medical attention.  After returning home he noticed his blood pressure was elevated at 185 systolic.  Contacted the on-call doctor. Diovan was increased from 80 to 160 mg daily in addition to HCTZ.  He has had some positional lightheadedness.  His carefully monitored his blood pressure.     The patient his wife recently moved to [place] in October, 2012.  The patient went back to [place], [place] undergo redo AVR and ascending aortic aneurysm repair at [facility] in [facility] on 2/21/2013.  Preoperative cardiac catheterization demonstrated that his coronary arteries \"were all patent\".  Postoperatively as well.  Had postoperative atrial fibrillation on amiodarone which as been discontinued.       Past Medical History:   Diagnosis Date   • Arthritis     hands   • Basal cell carcinoma of lip 11/27/2013   • Biventricular cardiac pacemaker in situ 3/4/2015   • Breath shortness     with exertion   • Cancer (HCC)     prostate   • GERD (gastroesophageal reflux disease) 10/17/2013   • Heart burn    • Hypertension     • Indigestion    • Insomnia 10/17/2013   • LBBB (left bundle branch block)     • MEDICAL HOME    • " Pacemaker    • Prostate cancer (HCC)    • PVCs (premature ventricular contractions) 3/4/2015   • S/P AVR (aortic valve replacement) 2013    redo; [place], [place]   • S/P prostatectomy    • S/P thoracic aortic aneurysm repair 2013   • Seasonal allergies 10/17/2013   • Valvular heart disease      Past Surgical History:   Procedure Laterality Date   • TRANSCATHETER MITRAL VALVE REPAIR Right 2019    Procedure: REPAIR, MITRAL VALVE, TRANSCATHETER- AND ANY ASSOCIATED PROCEDURES INCLUDING ARTAIL SEPTAL DEFECT CLOSURE AND PERMANENT PACE MAKER PLACEMENT;  Surgeon: Neto Ramirez M.D.;  Location: SURGERY [facility];  Service: Cardiac   • TRAVIS  2019    Procedure: ECHOCARDIOGRAM, TRANSESOPHAGEAL;  Surgeon: Neto Ramirez M.D.;  Location: SURGERY [facility];  Service: Cardiac   • RECOVERY  2014    Performed by Cath-Recovery Surgery at SURGERY SAME DAY [facility]   • AORTIC VALVE REPLACEMENT  13    Bovine tissur valve with ascending aortic repair..  Done in [place].   • [facility] CARDIAC CATH  2013    normal coronaries prior to AVR.   • OTHER      carcinoma removed from lip   • PROSTATECTOMY, RADIAL  .    prostate cancer.   • APPENDECTOMY     • MITRAL VALVE REPLACE      S/P lisa Clip X1   • OTHER CARDIAC SURGERY   and     aVR. Thoracic aortic aneurysm repair.     Family History   Problem Relation Age of Onset   • Other Mother          at age 82. Parkinson's disease.   • Cancer Father          at age 68. Prostate cancer.   • Cancer Sister         Lung   • Heart Disease Neg Hx      Social History     Socioeconomic History   • Marital status:      Spouse name: Not on file   • Number of children: Not on file   • Years of education: Not on file   • Highest education level: Not on file   Occupational History   • Not on file   Social Needs   • Financial resource strain: Not on file   • Food insecurity     Worry: Not on file      Inability: Not on file   • Transportation needs     Medical: Not on file     Non-medical: Not on file   Tobacco Use   • Smoking status: Never Smoker   • Smokeless tobacco: Never Used   Substance and Sexual Activity   • Alcohol use: Yes     Comment: 1/day   • Drug use: No   • Sexual activity: Yes     Partners: Female   Lifestyle   • Physical activity     Days per week: Not on file     Minutes per session: Not on file   • Stress: Not on file   Relationships   • Social connections     Talks on phone: Not on file     Gets together: Not on file     Attends Yazidism service: Not on file     Active member of club or organization: Not on file     Attends meetings of clubs or organizations: Not on file     Relationship status: Not on file   • Intimate partner violence     Fear of current or ex partner: Not on file     Emotionally abused: Not on file     Physically abused: Not on file     Forced sexual activity: Not on file   Other Topics Concern   • Not on file   Social History Narrative    Retired . . Moved to [place] in October, 2012.     No Known Allergies  Outpatient Encounter Medications as of 4/14/2020   Medication Sig Dispense Refill   • valsartan (DIOVAN) 160 MG Tab Take 1 Tab by mouth every day. 90 Tab 3   • metoprolol SR (TOPROL XL) 25 MG TABLET SR 24 HR TAKE 1 TABLET BY MOUTH 1/2  HOUR AFTER DINNER AS  DIRECTED (Patient taking differently: 25 mg. Take 1 tablet every evening) 90 Tab 3   • omeprazole (PRILOSEC) 20 MG delayed-release capsule Take 1 Cap by mouth every day. 90 Cap 3   • fluticasone (FLONASE) 50 MCG/ACT nasal spray Spray 2 Sprays in nose 1 time daily as needed.     • Cholecalciferol (VITAMIN D) 400 UNIT TABS Take 400 Units by mouth every day.     • DiphenhydrAMINE HCl (BENADRYL ALLERGY PO) Take 1 Each by mouth at bedtime as needed. As needed for sleep      • aspirin 81 MG tablet Take 81 mg by mouth every evening.       No facility-administered encounter medications on file as of  "4/14/2020.      Review of Systems   Respiratory: Negative for cough and shortness of breath.    Cardiovascular: Negative for chest pain, palpitations, orthopnea, leg swelling and PND.   Musculoskeletal: Negative for myalgias.   Neurological: Negative for dizziness and loss of consciousness.        Objective:   /74 (BP Location: Left arm, Patient Position: Sitting, BP Cuff Size: Adult)   Pulse 75   Ht 1.905 m (6' 3\")   Wt 87.5 kg (193 lb)   BMI 24.12 kg/m²     Physical Exam   Constitutional: He is oriented to person, place, and time. No distress.   Neurological: He is alert and oriented to person, place, and time.   Psychiatric: He has a normal mood and affect. His behavior is normal.     02/27/2013 ECHOCARDIOGRAM  EF 30-40%. Aortic valve prosthesis. 1-2+ MR. 2+ TR.     12/12/2014 ECHOCARDIOGRAM  Pt has a prosthetic aortic valve with normal motion.  Transvalvular gradients are - Peak: 20 mmHg Mean: 8 mmHg.  Myxomatous change with prolapse of the both mitral leaflet was present.  Eccentric, more than one jets of mild to moderate mitral regurgitation.  Mild to moderately dilated left atrium.  Normal left ventricular chamber size.  Mild concentric left ventricular hypertrophy.  Difficult to determine systolic function due to irregular heart rhythm   but appeared preserved.  Septal bounces probably from conduction abnormality (LBBB).  Normal right ventricular systolic function.  Unable to estimate pulmonary artery pressure due to an inadequate   tricuspid regurgitant jet.     11/11/2015 ECHOCARDIOGRAM  Bovine bioprosthetic aortic valve; normal function.  Normal left ventricular systolic function.  Left ventricular ejection fraction is visually estimated to be 70%.  Asymmetric septal hypertrophy.  Mitral valve prolapse of anterior and posterior leaflets with   myxomatous changes.  Moderate eccentric mitral regurgitation.  Right heart pressures are normal.     11/01/2017 ECHOCARDIOGRAM   Normal left   ventricular " systolic function.  Left ventricular ejection fraction is visually estimated to be 70%.  Myxomatous mitral valve leaflet thickening with prolapse of the   anterior and posterior leaflets.  Severe eccentric mitral regurgitation.  Normal function of mechanical prosthetic valve.  Right heart pressures are normal.  Pacer/ICD wire seen in right ventricle.     02/12/2019 TRAVIS  The left ventricle was normal in size and function.  Left ventricular ejection fraction is visually estimated to be 70%.  Myxomatous mitral valve with prolapse of both the anterior and   posterior leaflets.  Severe mitral regurgitation.  Known bioprosthetic aortic valve that is functioning normally.    ECHOCARDIOGRAM 05/27/2019  Prior study done 4/23/19 - compared to the report of the study done -   there has been no significant change.   Normal left ventricular systolic function.  Left ventricular ejection fraction is visually estimated to be 65%.  Pacer/ICD wire seen in right ventricle.  Known transcatheter mitral valve repair which is functioning normally   with appropriate transvalvular gradient.  Mean transvalvular gradient is 2 mmHg   Normal function of bioprosthetic valve which is working well.  Vmax is  2.40 m/s. Transvalvular gradients are - Peak: 23 mmHg, Mean:   11 mmHg.   Right ventricular systolic pressure is estimated to be 20 mmHg.    ECHOCARDIOGRAM 04/01/2020  Prior study done 5/27/19 - compared to the report of the study done - there has been no significant change.   Normal left ventricular systolic function.  Left ventricular ejection fraction is visually estimated to be 65%.  Known transcatheter mitral valve repair which is functioning normally with appropriate transvalvular gradient.  Mean transvalvular gradient is 2 mmHg  Normal function of bioprosthetic aortic valve.  Vmax is 2.23  m/s. Transvalvular gradients are - Peak: 20 mmHg, Mean: 11 mmHg  Right ventricular systolic pressure is estimated to be 20 mmHg.    /08/2013 EKG:  Normal sinus rhythm, rate 67. First degree AV block. Left bundle branch block. Unchanged since 2/22/2013.     12/08/2014 EKG: Normal sinus rhythm, rate 92. First degree AV block. Left bundle branch block. Intermittent left anterior hemiblock. Isolated PVCs.    Assessment:     1. S/P AVR (aortic valve replacement)     2. S/P mitral valve clip implantation     3. S/P thoracic aortic aneurysm repair     4. Biventricular cardiac pacemaker in situ     5. Essential hypertension, benign     6. PVCs (premature ventricular contractions)     7. LBBB (left bundle branch block)         Medical Decision Making:  Today's Assessment / Status / Plan:   Assessment  1.  S/P redo AVR 23 mm composite pericardial tissue valve ascending aortic aneurysm repair 28 mm Valsalva sleeve graft for degenerated homograft AVR with severe aortic insufficiency. 2/21/2013. [facility].       2.  AVR.  Homograft.  2000. Critical aortic stenosis.  [place], [place]    3.  Biventricular pacemaker. 12/29/2014. Syncope unknown etiology. Functioning normally.    4.  Nonischemic cardiomyopathy, resolved post CRT-D    5.  S/P MitraClip 4/2019.  Myxomatous mitral valve disease.    6.  Hypertension.       7.  Syncope. 2014. No recurrence.     8.  Palpitations. Chronic. Related to PVCs now controlled.     9.  PVCs. Chronic.     10.  History of LBBB.     Recommendation/Discussion  1.   The patient's current cardiac status is stable regarding has valvular disease, hypertension and CRT function.  2.  Reviewed recent 4/1/2019 echocardiogram results with the patient.  4.  Continue current cardiac therapy.  5.  Advised to speak with Dr. Packer regarding losartan/valsartan issue.  6.  Follow-up 6 months.    This consultation was conducted utilizing secure and encrypted videoconferencing equipment with the assistance of a trained tele-presenter at the originating site.  Session beginning 2:27 PM, ending  2:35 PM, 8 minutes.

## 2020-06-01 ENCOUNTER — OFFICE VISIT (OUTPATIENT)
Dept: CARDIOLOGY | Facility: MEDICAL CENTER | Age: 80
End: 2020-06-01
Payer: MEDICARE

## 2020-06-01 VITALS
SYSTOLIC BLOOD PRESSURE: 124 MMHG | DIASTOLIC BLOOD PRESSURE: 82 MMHG | HEIGHT: 75 IN | RESPIRATION RATE: 14 BRPM | WEIGHT: 194.12 LBS | OXYGEN SATURATION: 95 % | BODY MASS INDEX: 24.14 KG/M2 | HEART RATE: 74 BPM

## 2020-06-01 DIAGNOSIS — Z98.890 S/P THORACIC AORTIC ANEURYSM REPAIR: ICD-10-CM

## 2020-06-01 DIAGNOSIS — Z95.818 S/P MITRAL VALVE CLIP IMPLANTATION: ICD-10-CM

## 2020-06-01 DIAGNOSIS — Z86.79 S/P THORACIC AORTIC ANEURYSM REPAIR: ICD-10-CM

## 2020-06-01 DIAGNOSIS — Z98.890 S/P MITRAL VALVE CLIP IMPLANTATION: ICD-10-CM

## 2020-06-01 DIAGNOSIS — Z95.2 S/P AVR (AORTIC VALVE REPLACEMENT): ICD-10-CM

## 2020-06-01 PROCEDURE — 99213 OFFICE O/P EST LOW 20 MIN: CPT | Performed by: INTERNAL MEDICINE

## 2020-06-01 RX ORDER — OMEPRAZOLE 20 MG/1
CAPSULE, DELAYED RELEASE ORAL
COMMUNITY
Start: 2020-05-09 | End: 2020-06-01

## 2020-06-01 RX ORDER — METOPROLOL SUCCINATE 25 MG/1
TABLET, EXTENDED RELEASE ORAL
COMMUNITY
Start: 2020-05-09 | End: 2020-06-01

## 2020-06-01 RX ORDER — VALSARTAN 160 MG/1
TABLET ORAL
COMMUNITY
Start: 2020-04-05 | End: 2020-06-01

## 2020-06-01 ASSESSMENT — ENCOUNTER SYMPTOMS
WEIGHT LOSS: 0
FEVER: 0
DIZZINESS: 0
NEUROLOGICAL NEGATIVE: 1
NERVOUS/ANXIOUS: 0
CONSTITUTIONAL NEGATIVE: 1
RESPIRATORY NEGATIVE: 1
VOMITING: 0
COUGH: 0
PALPITATIONS: 0
BLURRED VISION: 0
ABDOMINAL PAIN: 0
CHILLS: 0
EYES NEGATIVE: 1
MYALGIAS: 0
SHORTNESS OF BREATH: 0
BRUISES/BLEEDS EASILY: 0
PSYCHIATRIC NEGATIVE: 1
NAUSEA: 0
DEPRESSION: 0
FOCAL WEAKNESS: 0
DOUBLE VISION: 0
WEAKNESS: 0
HEADACHES: 0
CARDIOVASCULAR NEGATIVE: 1
GASTROINTESTINAL NEGATIVE: 1
CLAUDICATION: 0
MUSCULOSKELETAL NEGATIVE: 1

## 2020-06-01 ASSESSMENT — FIBROSIS 4 INDEX: FIB4 SCORE: 3.3

## 2020-06-01 NOTE — PROGRESS NOTES
Chief Complaint   Patient presents with   • Shortness of Breath     s/p mitral valve clip implantation       Subjective:   Ross Mcneal is a 80 y.o. male who presents today for one year MitraClip repair follow up.    Since the patient's last visit on 05/30/19, he has been doing well clinically. He denies fatigue, shortness of breath, dyspnea on exertion, chest pain, dizziness or syncope. He is happy staying home with pandemic and rioting.    Past Medical History:   Diagnosis Date   • Arthritis     hands   • Basal cell carcinoma of lip 11/27/2013   • Biventricular cardiac pacemaker in situ 3/4/2015   • Breath shortness     with exertion   • Cancer (HCC)     prostate   • GERD (gastroesophageal reflux disease) 10/17/2013   • Heart burn    • Hypertension     • Indigestion    • Insomnia 10/17/2013   • LBBB (left bundle branch block)     • MEDICAL HOME    • Pacemaker    • Prostate cancer (HCC) 2003   • PVCs (premature ventricular contractions) 3/4/2015   • S/P AVR (aortic valve replacement) 2/21/2013    [facility]; [place], [place]   • S/P prostatectomy 2003   • S/P thoracic aortic aneurysm repair 2/21/2013   • Seasonal allergies 10/17/2013   • Valvular heart disease      Past Surgical History:   Procedure Laterality Date   • TRANSCATHETER MITRAL VALVE REPAIR Right 4/22/2019    Procedure: REPAIR, MITRAL VALVE, TRANSCATHETER- AND ANY ASSOCIATED PROCEDURES INCLUDING ARTAIL SEPTAL DEFECT CLOSURE AND PERMANENT PACE MAKER PLACEMENT;  Surgeon: Neto Ramirez M.D.;  Location: SURGERY [facility];  Service: Cardiac   • TRAVIS  4/22/2019    Procedure: ECHOCARDIOGRAM, TRANSESOPHAGEAL;  Surgeon: Neto Ramirez M.D.;  Location: SURGERY [facility];  Service: Cardiac   • RECOVERY  12/29/2014    Performed by Cath-Recovery Surgery at SURGERY SAME DAY [facility]   • AORTIC VALVE REPLACEMENT  2/21/13    Bovine tissur valve with ascending aortic repair..  Done in [place].   • [facility] CARDIAC CATH   2013    normal coronaries prior to AVR.   • OTHER      carcinoma removed from lip   • PROSTATECTOMY, RADIAL  .    prostate cancer.   • APPENDECTOMY     • MITRAL VALVE REPLACE      S/P lisa Clip X1   • OTHER CARDIAC SURGERY   and     aVR. Thoracic aortic aneurysm repair.     Family History   Problem Relation Age of Onset   • Other Mother          at age 82. Parkinson's disease.   • Cancer Father          at age 68. Prostate cancer.   • Cancer Sister         Lung   • Heart Disease Neg Hx      Social History     Socioeconomic History   • Marital status:      Spouse name: Not on file   • Number of children: Not on file   • Years of education: Not on file   • Highest education level: Not on file   Occupational History   • Not on file   Social Needs   • Financial resource strain: Not on file   • Food insecurity     Worry: Not on file     Inability: Not on file   • Transportation needs     Medical: Not on file     Non-medical: Not on file   Tobacco Use   • Smoking status: Never Smoker   • Smokeless tobacco: Never Used   Substance and Sexual Activity   • Alcohol use: Yes     Comment: 1/day   • Drug use: No   • Sexual activity: Yes     Partners: Female   Lifestyle   • Physical activity     Days per week: Not on file     Minutes per session: Not on file   • Stress: Not on file   Relationships   • Social connections     Talks on phone: Not on file     Gets together: Not on file     Attends [employer] service: Not on file     Active member of club or organization: Not on file     Attends meetings of clubs or organizations: Not on file     Relationship status: Not on file   • Intimate partner violence     Fear of current or ex partner: Not on file     Emotionally abused: Not on file     Physically abused: Not on file     Forced sexual activity: Not on file   Other Topics Concern   • Not on file   Social History Narrative    Retired . . Moved to [place] in .     No Known  Allergies     (Medications reviewed.)  Outpatient Encounter Medications as of 6/1/2020   Medication Sig Dispense Refill   • valsartan (DIOVAN) 160 MG Tab Take 1 Tab by mouth every day. 90 Tab 3   • metoprolol SR (TOPROL XL) 25 MG TABLET SR 24 HR TAKE 1 TABLET BY MOUTH 1/2  HOUR AFTER DINNER AS  DIRECTED (Patient taking differently: 25 mg. Take 1 tablet every evening) 90 Tab 3   • omeprazole (PRILOSEC) 20 MG delayed-release capsule Take 1 Cap by mouth every day. 90 Cap 3   • fluticasone (FLONASE) 50 MCG/ACT nasal spray Spray 2 Sprays in nose 1 time daily as needed.     • Cholecalciferol (VITAMIN D) 400 UNIT TABS Take 400 Units by mouth every day.     • DiphenhydrAMINE HCl (BENADRYL ALLERGY PO) Take 1 Each by mouth at bedtime as needed. As needed for sleep      • aspirin 81 MG tablet Take 81 mg by mouth every evening.     • [DISCONTINUED] metoprolol SR (TOPROL XL) 25 MG TABLET SR 24 HR      • [DISCONTINUED] omeprazole (PRILOSEC) 20 MG delayed-release capsule      • [DISCONTINUED] valsartan (DIOVAN) 160 MG Tab        No facility-administered encounter medications on file as of 6/1/2020.      Review of Systems   Constitutional: Negative.  Negative for chills, fever, malaise/fatigue and weight loss.   HENT: Negative.  Negative for hearing loss.    Eyes: Negative.  Negative for blurred vision and double vision.   Respiratory: Negative.  Negative for cough and shortness of breath.    Cardiovascular: Negative.  Negative for chest pain, palpitations, claudication and leg swelling.   Gastrointestinal: Negative.  Negative for abdominal pain, nausea and vomiting.   Genitourinary: Negative.  Negative for dysuria and urgency.   Musculoskeletal: Negative.  Negative for joint pain and myalgias.   Skin: Negative.  Negative for itching and rash.   Neurological: Negative.  Negative for dizziness, focal weakness, weakness and headaches.   Endo/Heme/Allergies: Negative.  Does not bruise/bleed easily.   Psychiatric/Behavioral: Negative.   "Negative for depression. The patient is not nervous/anxious.         Objective:   /82 (BP Location: Left arm, Patient Position: Sitting, BP Cuff Size: Adult)   Pulse 74   Resp 14   Ht 1.905 m (6' 3\")   Wt 88 kg (194 lb 1.8 oz)   SpO2 95%   BMI 24.26 kg/m²     Physical Exam   Constitutional: He is oriented to person, place, and time. He appears well-developed and well-nourished.   HENT:   Head: Normocephalic and atraumatic.   Eyes: EOM are normal.   Neck: No JVD present.   Cardiovascular: Normal rate. A regularly irregular rhythm present.   Murmur heard.  Pulmonary/Chest: Effort normal and breath sounds normal.   Abdominal: Soft. Bowel sounds are normal.   No hepatosplenomegaly.   Musculoskeletal: Normal range of motion.   Lymphadenopathy:     He has no cervical adenopathy.   Neurological: He is alert and oriented to person, place, and time.   Skin: Skin is warm and dry.   Psychiatric: He has a normal mood and affect.     CARDIAC STUDIES/PROCEDURES:     CARDIAC CATHETERIZATION CONCLUSIONS by Dr. Conte (03/12/19)  1.  Severe mitral regurgitation.  2.  Left ventricular ejection fraction 61% with normal wall motion.  3.  Coronary arteries are angiographically normal.  4.  Normal pulmonary pressures.  5.  Elevated bioprosthetic aortic valve pullback gradient.  6.  Elevated LVEDP.     ECHOCARDIOGRAM CONCLUSIONS (04/01/20)  Prior study done 5/27/19 - compared to the report of the study done -   there has been no significant change.   Normal left ventricular systolic function.  Left ventricular ejection fraction is visually estimated to be 65%.  Known transcatheter mitral valve repair which is functioning normally   with appropriate transvalvular gradient.  Mean transvalvular gradient is 2 mmHg  Normal function of bioprosthetic aortic valve.  Vmax is 2.23  m/s. Transvalvular gradients are - Peak: 20 mmHg, Mean: 11 mmHg  Right ventricular systolic pressure is estimated to be 20 mmHg.  (study result " reviewed)    ECHOCARDIOGRAM CONCLUSIONS (05/27/19)  Prior study done 4/23/19 - compared to the report of the study done - there has been no significant change.   Normal left ventricular systolic function.  Left ventricular ejection fraction is visually estimated to be 65%.  Pacer/ICD wire seen in right ventricle.  Known transcatheter mitral valve repair which is functioning normally with appropriate transvalvular gradient.  Mean transvalvular gradient is 2 mmHg   Trace mitral regurgitation.  Normal function of bioprosthetic valve which is working well.  Vmax is  2.40 m/s. Transvalvular gradients are - Peak: 23 mmHg, Mean: 11 mmHg.   Right ventricular systolic pressure is estimated to be 20 mmHg.     ECHOCARDIOGRAM CONCLUSIONS (04/23/19)  Normal left ventricular systolic function and regional wall motion.  Left ventricular ejection fraction is visually estimated to be 60%.  The right ventricle was normal in size and function.  Post MITRACLIP repair.  Mitral regurgitation present.  Known bioprosthetic aortic valve that is functioning normally with   normal transvalvular gradients.  Compared to the images of the study done  10/17/18 - there has been   interval placement of a MITRACLIP.     ECHOCARDIOGRAM CONCLUSIONS (10/17/18)  Normal left ventricular size and systolic function.  Left ventricular ejection fraction is visually estimated to be 65%.  Mild concentric left ventricular hypertrophy.  Known bioprosthetic aortic valve that is functioning normally with   normal transvalvular gradients.  Myxomatous mitral valve disease.  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  Severe mitral regurgitation.  Pacer/ICD wire seen in right ventricle.     EKG performed on (04/26/19) EKG shows paced rhythm.  EKG performed on (04/23/19) EKG shows sinus rhythm with right bundle branch block.  EKG performed on (03/11/19) EKG shows paced rhythm.     Laboratory results of (04/23/20) were reviewed. Bun of 26  mg/dl, creatinine levels of 1.05 mg/dl noted.  Laboratory results of (04/23/19) Bun of 26 mg/dl, creatinine levels of 1.05 mg/dl noted.    PACEMAKER PLACEMENT by Jam Escobar (12/29/14)  [facility] pacemaker placement      TRANSESOPHAGEAL ECHOCARDIOGRAM CONCLUSIONS by Dr. Conte (02/12/19)  The left ventricle was normal in size and function.  Left ventricular ejection fraction is visually estimated to be 70%.  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  Severe mitral regurgitation.  Known bioprosthetic aortic valve that is functioning normally.     TRANSESOPHAGEAL ECHOCARDIOGRAM CONCLUSIONS by Dr. Samaniego (04/22/19)  Intraoperative TRAVIS during mitraclip procedure.  Baseline images show   normal biventricular function with EF = 55%.  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  Severe mitral   regurgitation.  Single mitraclip deployed at A3/P3 interface with mild   residual MR, mean gradient of 1 mm Hg, and no evidence of complication.    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  No   evidence of complication.    '  Assessment:     1. S/P mitral valve clip implantation     2. S/P AVR (aortic valve replacement)     3. S/P thoracic aortic aneurysm repair       Medical Decision Making:  Today's Assessment / Status / Plan:     1. Status post transcatheter percutaneous mitral valve repair (MitraClip) with 1 clip (04/22/19): He is clinically doing well, NYHA class I.  2. History of surgical aortic valve replacement ([facility]ograft valve at [facility] in 2000 and [facility] aortic valve replacement and ascending aortic aneurysm repair with #23 mm bioprosthetic valve and 28 mm tube graft at [facility] in 02/21/2013): He is clinically doing well. We will repeat an echocardiogram.    We will follow up with him PRN from valve standpoint and have him  continue follow up with his primary cardiologist, Rashard Martin.    CC Rashard Martin and Scottie Chang

## 2020-07-10 RX ORDER — OMEPRAZOLE 20 MG/1
CAPSULE, DELAYED RELEASE ORAL
Qty: 90 CAP | Refills: 3 | Status: SHIPPED | OUTPATIENT
Start: 2020-07-10 | End: 2021-10-04 | Stop reason: SDUPTHER

## 2020-08-17 RX ORDER — VALSARTAN 160 MG/1
TABLET ORAL
Qty: 90 TAB | Refills: 3 | Status: SHIPPED | OUTPATIENT
Start: 2020-08-17 | End: 2020-10-12 | Stop reason: SDUPTHER

## 2020-09-04 ENCOUNTER — NON-PROVIDER VISIT (OUTPATIENT)
Dept: CARDIOLOGY | Facility: MEDICAL CENTER | Age: 80
End: 2020-09-04
Payer: MEDICARE

## 2020-09-04 DIAGNOSIS — Z95.0 BIVENTRICULAR CARDIAC PACEMAKER IN SITU: ICD-10-CM

## 2020-09-04 DIAGNOSIS — I44.2 ATRIOVENTRICULAR BLOCK, COMPLETE (HCC): ICD-10-CM

## 2020-09-04 PROCEDURE — 93281 PM DEVICE PROGR EVAL MULTI: CPT | Performed by: INTERNAL MEDICINE

## 2020-10-12 RX ORDER — VALSARTAN 160 MG/1
160 TABLET ORAL
Qty: 90 TAB | Refills: 3 | Status: SHIPPED | OUTPATIENT
Start: 2020-10-12 | End: 2021-03-01

## 2020-12-01 ENCOUNTER — OFFICE VISIT (OUTPATIENT)
Dept: CARDIOLOGY | Facility: MEDICAL CENTER | Age: 80
End: 2020-12-01
Payer: MEDICARE

## 2020-12-01 VITALS
HEIGHT: 75 IN | WEIGHT: 189 LBS | DIASTOLIC BLOOD PRESSURE: 70 MMHG | HEART RATE: 60 BPM | SYSTOLIC BLOOD PRESSURE: 120 MMHG | RESPIRATION RATE: 16 BRPM | OXYGEN SATURATION: 98 % | BODY MASS INDEX: 23.5 KG/M2

## 2020-12-01 DIAGNOSIS — Z86.79 S/P THORACIC AORTIC ANEURYSM REPAIR: ICD-10-CM

## 2020-12-01 DIAGNOSIS — Z98.890 S/P MITRAL VALVE CLIP IMPLANTATION: ICD-10-CM

## 2020-12-01 DIAGNOSIS — Z98.890 S/P THORACIC AORTIC ANEURYSM REPAIR: ICD-10-CM

## 2020-12-01 DIAGNOSIS — Z95.2 S/P AVR (AORTIC VALVE REPLACEMENT): ICD-10-CM

## 2020-12-01 DIAGNOSIS — Z95.818 S/P MITRAL VALVE CLIP IMPLANTATION: ICD-10-CM

## 2020-12-01 DIAGNOSIS — I49.3 PVCS (PREMATURE VENTRICULAR CONTRACTIONS): ICD-10-CM

## 2020-12-01 DIAGNOSIS — I10 ESSENTIAL HYPERTENSION, BENIGN: ICD-10-CM

## 2020-12-01 DIAGNOSIS — Z95.0 BIVENTRICULAR CARDIAC PACEMAKER IN SITU: ICD-10-CM

## 2020-12-01 DIAGNOSIS — R00.2 PALPITATIONS: ICD-10-CM

## 2020-12-01 PROCEDURE — 99214 OFFICE O/P EST MOD 30 MIN: CPT | Performed by: INTERNAL MEDICINE

## 2020-12-01 RX ORDER — A/SINGAPORE/GP1908/2015 IVR-180 (AN A/MICHIGAN/45/2015 (H1N1)PDM09-LIKE VIRUS, A/HONG KONG/4801/2014, NYMC X-263B (H3N2) (AN A/HONG KONG/4801/2014-LIKE VIRUS), AND B/BRISBANE/60/2008, WILD TYPE (A B/BRISBANE/60/2008-LIKE VIRUS) 15; 15; 15 UG/.5ML; UG/.5ML; UG/.5ML
0.5 INJECTION, SUSPENSION INTRAMUSCULAR
COMMUNITY
Start: 2020-09-14 | End: 2021-03-01

## 2020-12-01 ASSESSMENT — ENCOUNTER SYMPTOMS
PND: 0
SHORTNESS OF BREATH: 0
MYALGIAS: 0
DIZZINESS: 0
LOSS OF CONSCIOUSNESS: 0
COUGH: 0
PALPITATIONS: 0
ORTHOPNEA: 0

## 2020-12-01 ASSESSMENT — FIBROSIS 4 INDEX: FIB4 SCORE: 3.3

## 2020-12-01 NOTE — PROGRESS NOTES
Chief Complaint   Patient presents with   •   Mitral Regurgitation     Follow up       Subjective:   Ross Mcneal is a 80 y.o. male who presents today for followup nonischemic cardiomyopathy, hypertension, BV permanent pacemaker with recovered EF, LBBB, aortic valve disease, status post aortic valve replacement and ascending aortic aneurysm repair, MitraClip and ventricular ectopy.    Last seen on 6/1/2020 by Dr. Neto Ramirez.    Since 6/1/2020 the patient has had no cardiac problems or symptoms.  No palpitations.  Has switched back to valsartan but finds the pills too large.    Since 9/19/2019 the patient has had no cardiac problems or symptoms.  Losartan was switched to valsartan by Dr. Scottie Packer apparently for contamination issues.  No palpitations.    Since 5/30/2019 the patient has had no cardiac problems.  Underwent MitraClip procedure 4/2019 without difficulty.  Planning a 2-week trip to [place] with his wife.    Since 9/27/2018 appointment the patient reports exertional shortness of breath and chest discomfort.  No PND, orthopnea, edema or palpitations.    Since 5/4/2018 the patient has had no cardiac symptoms.  The patient and his wife became ill after returning from their first trip from [facility].  Had respiratory problems and required 2 courses of antibiotics the last one being completed just a week ago.  The patient feels that he is recovering with some minimal fatigue.     Since 10/12/2017 the patient has had no cardiac symptoms.  No heart failure symptoms, palpitations or dizziness.  AICD device functioning normally.     Since 3/16/2017 appointment the patient's had no cardiac symptoms.  Compliant with his medications.     Since 9/15/2016 appointment no cardiac symptoms.  Continues to stay physically active without any difficulty.  Plans a cruise in April.     Between 12/29/2014-12/30/2014 [facility].  Successfully underwent biventricular pacemaker implantation  "by Dr. Jam Lacy.  Indications were syncope/collapse with first degree AV block and a left bundle branch block.  Has recovered well.  Biggest improvement the patient states that first, no more syncope second, no more positional dizziness, lightheadedness or near syncope when he gets up from a sitting position.  Has had chronic palpitations with skipping beats.  Has not changed.  Usually picks up on his blood pressure monitored.     Monitors his blood pressure daily and his blood pressure log indicates excellent control of his blood pressure.     Past Medical History  November, 2014 while in [place] at a conference, the patient was walking down the hotel hallway carrying some milk and the next thing he remembers he was on the floor looking up.  Had no injury.  No warning symptoms.  Did not seek medical attention.  After returning home he noticed his blood pressure was elevated at 185 systolic.  Contacted the on-call doctor. Diovan was increased from 80 to 160 mg daily in addition to HCTZ.  He has had some positional lightheadedness.  His carefully monitored his blood pressure.     The patient his wife recently moved to [place] in October, 2012.  The patient went back to [place], [place] undergo redo AVR and ascending aortic aneurysm repair at [facility] in [facility] on 2/21/2013.  Preoperative cardiac catheterization demonstrated that his coronary arteries \"were all patent\".  Postoperatively as well.  Had postoperative atrial fibrillation on amiodarone which as been discontinued.       Past Medical History:   Diagnosis Date   • Arthritis     hands   • Basal cell carcinoma of lip 11/27/2013   • Biventricular cardiac pacemaker in situ 3/4/2015   • Breath shortness     with exertion   • Cancer (HCC)     prostate   • GERD (gastroesophageal reflux disease) 10/17/2013   • Heart burn    • Hypertension     • Indigestion    • Insomnia 10/17/2013   • LBBB (left bundle branch block)     • " MEDICAL HOME    • Pacemaker    • Prostate cancer (HCC)    • PVCs (premature ventricular contractions) 3/4/2015   • S/P AVR (aortic valve replacement) 2013    redo; [place], [place]   • S/P prostatectomy    • S/P thoracic aortic aneurysm repair 2013   • Seasonal allergies 10/17/2013   • Valvular heart disease      Past Surgical History:   Procedure Laterality Date   • TRANSCATHETER MITRAL VALVE REPAIR Right 2019    Procedure: REPAIR, MITRAL VALVE, TRANSCATHETER- AND ANY ASSOCIATED PROCEDURES INCLUDING ARTAIL SEPTAL DEFECT CLOSURE AND PERMANENT PACE MAKER PLACEMENT;  Surgeon: Neto Ramirez M.D.;  Location: SURGERY [facility];  Service: Cardiac   • TRAVIS  2019    Procedure: ECHOCARDIOGRAM, TRANSESOPHAGEAL;  Surgeon: Neto Ramirez M.D.;  Location: SURGERY [facility];  Service: Cardiac   • RECOVERY  2014    Performed by Cath-Recovery Surgery at SURGERY SAME DAY [facility]   • AORTIC VALVE REPLACEMENT  13    Bovine tissur valve with ascending aortic repair..  Done in [place].   • [facility] CARDIAC CATH  2013    normal coronaries prior to AVR.   • OTHER      carcinoma removed from lip   • PROSTATECTOMY, RADIAL  .    prostate cancer.   • APPENDECTOMY     • MITRAL VALVE REPLACE      S/P lisa Clip X1   • OTHER CARDIAC SURGERY   and     aVR. Thoracic aortic aneurysm repair.     Family History   Problem Relation Age of Onset   • Other Mother          at age 82. Parkinson's disease.   • Cancer Father          at age 68. Prostate cancer.   • Cancer Sister         Lung   • Heart Disease Neg Hx      Social History     Socioeconomic History   • Marital status:      Spouse name: Not on file   • Number of children: Not on file   • Years of education: Not on file   • Highest education level: Not on file   Occupational History   • Not on file   Social Needs   • Financial resource strain: Not on file   • Food insecurity      Worry: Not on file     Inability: Not on file   • Transportation needs     Medical: Not on file     Non-medical: Not on file   Tobacco Use   • Smoking status: Never Smoker   • Smokeless tobacco: Never Used   Substance and Sexual Activity   • Alcohol use: Yes     Comment: 1/day   • Drug use: No   • Sexual activity: Yes     Partners: Female   Lifestyle   • Physical activity     Days per week: Not on file     Minutes per session: Not on file   • Stress: Not on file   Relationships   • Social connections     Talks on phone: Not on file     Gets together: Not on file     Attends Yazdanism service: Not on file     Active member of club or organization: Not on file     Attends meetings of clubs or organizations: Not on file     Relationship status: Not on file   • Intimate partner violence     Fear of current or ex partner: Not on file     Emotionally abused: Not on file     Physically abused: Not on file     Forced sexual activity: Not on file   Other Topics Concern   • Not on file   Social History Narrative    Retired . . Moved to [place] in October, 2012.     No Known Allergies  Outpatient Encounter Medications as of 12/1/2020   Medication Sig Dispense Refill   • FLUAD QUADRIVALENT 0.5 ML Prefilled Syringe Inject 0.5 mL into the shoulder, thigh, or buttocks. Administer 0.5 mL intramuscularly as directed     • valsartan (DIOVAN) 160 MG Tab Take 1 Tab by mouth every day. 90 Tab 3   • metoprolol SR (TOPROL XL) 25 MG TABLET SR 24 HR Take 1 Tab by mouth every day. 90 Tab 3   • omeprazole (PRILOSEC) 20 MG delayed-release capsule TAKE 1 CAPSULE BY MOUTH  EVERY DAY 90 Cap 3   • fluticasone (FLONASE) 50 MCG/ACT nasal spray Spray 2 Sprays in nose 1 time daily as needed.     • Cholecalciferol (VITAMIN D) 400 UNIT TABS Take 400 Units by mouth every day.     • DiphenhydrAMINE HCl (BENADRYL ALLERGY PO) Take 1 Each by mouth at bedtime as needed. As needed for sleep      • aspirin 81 MG tablet Take 81 mg by mouth every  "evening.       No facility-administered encounter medications on file as of 12/1/2020.      Review of Systems   Respiratory: Negative for cough and shortness of breath.    Cardiovascular: Negative for chest pain, palpitations, orthopnea, leg swelling and PND.   Musculoskeletal: Negative for myalgias.   Neurological: Negative for dizziness and loss of consciousness.        Objective:   /70 (BP Location: Left arm, Patient Position: Sitting, BP Cuff Size: Adult)   Pulse 60   Resp 16   Ht 1.905 m (6' 3\")   Wt 85.7 kg (189 lb)   SpO2 98%   BMI 23.62 kg/m²     Physical Exam   Constitutional: He is oriented to person, place, and time. He appears well-developed and well-nourished.   Eyes: Pupils are equal, round, and reactive to light. Conjunctivae are normal.   Neck: Normal range of motion. Neck supple. No JVD present.   Cardiovascular: Normal rate, regular rhythm and intact distal pulses.   Murmur heard.  Pulmonary/Chest: Effort normal and breath sounds normal. No accessory muscle usage. No respiratory distress. He has no wheezes. He has no rales.   Midline scar   Musculoskeletal:         General: No edema.   Neurological: He is alert and oriented to person, place, and time.   Skin: Skin is warm, dry and intact. No rash noted. No cyanosis. Nails show no clubbing.   Psychiatric: He has a normal mood and affect. His behavior is normal.     02/27/2013 ECHOCARDIOGRAM  EF 30-40%. Aortic valve prosthesis. 1-2+ MR. 2+ TR.     12/12/2014 ECHOCARDIOGRAM  Pt has a prosthetic aortic valve with normal motion.  Transvalvular gradients are - Peak: 20 mmHg Mean: 8 mmHg.  Myxomatous change with prolapse of the both mitral leaflet was present.  Eccentric, more than one jets of mild to moderate mitral regurgitation.  Mild to moderately dilated left atrium.  Normal left ventricular chamber size.  Mild concentric left ventricular hypertrophy.  Difficult to determine systolic function due to irregular heart rhythm   but appeared " preserved.  Septal bounces probably from conduction abnormality (LBBB).  Normal right ventricular systolic function.  Unable to estimate pulmonary artery pressure due to an inadequate   tricuspid regurgitant jet.     11/11/2015 ECHOCARDIOGRAM  Bovine bioprosthetic aortic valve; normal function.  Normal left ventricular systolic function.  Left ventricular ejection fraction is visually estimated to be 70%.  Asymmetric septal hypertrophy.  Mitral valve prolapse of anterior and posterior leaflets with   myxomatous changes.  Moderate eccentric mitral regurgitation.  Right heart pressures are normal.     11/01/2017 ECHOCARDIOGRAM   Normal left   ventricular systolic function.  Left ventricular ejection fraction is visually estimated to be 70%.  Myxomatous mitral valve leaflet thickening with prolapse of the   anterior and posterior leaflets.  Severe eccentric mitral regurgitation.  Normal function of mechanical prosthetic valve.  Right heart pressures are normal.  Pacer/ICD wire seen in right ventricle.     02/12/2019 TRAVIS  The left ventricle was normal in size and function.  Left ventricular ejection fraction is visually estimated to be 70%.  Myxomatous mitral valve with prolapse of both the anterior and   posterior leaflets.  Severe mitral regurgitation.  Known bioprosthetic aortic valve that is functioning normally.    ECHOCARDIOGRAM 05/27/2019  Prior study done 4/23/19 - compared to the report of the study done -   there has been no significant change.   Normal left ventricular systolic function.  Left ventricular ejection fraction is visually estimated to be 65%.  Pacer/ICD wire seen in right ventricle.  Known transcatheter mitral valve repair which is functioning normally   with appropriate transvalvular gradient.  Mean transvalvular gradient is 2 mmHg   Normal function of bioprosthetic valve which is working well.  Vmax is  2.40 m/s. Transvalvular gradients are - Peak: 23 mmHg, Mean:   11 mmHg.   Right ventricular  systolic pressure is estimated to be 20 mmHg.    ECHOCARDIOGRAM 04/01/2020  Prior study done 5/27/19 - compared to the report of the study done - there has been no significant change.   Normal left ventricular systolic function.  Left ventricular ejection fraction is visually estimated to be 65%.  Known transcatheter mitral valve repair which is functioning normally with appropriate transvalvular gradient.  Mild mitral regurgitation.  Mean transvalvular gradient is 2 mmHg  Normal function of bioprosthetic aortic valve.  Vmax is 2.23  m/s. Transvalvular gradients are - Peak: 20 mmHg, Mean: 11 mmHg  Right ventricular systolic pressure is estimated to be 20 mmHg.    /08/2013 EKG: Normal sinus rhythm, rate 67. First degree AV block. Left bundle branch block. Unchanged since 2/22/2013.     12/08/2014 EKG: Normal sinus rhythm, rate 92. First degree AV block. Left bundle branch block. Intermittent left anterior hemiblock. Isolated PVCs.    Assessment:     1. S/P AVR (aortic valve replacement)     2. S/P mitral valve clip implantation     3. S/P thoracic aortic aneurysm repair     4. Essential hypertension, benign     5. Biventricular cardiac pacemaker in situ     6. PVCs (premature ventricular contractions)     7. Palpitations         Medical Decision Making:  Today's Assessment / Status / Plan:     Assessment  1.  S/P redo AVR 23 mm composite pericardial tissue valve ascending aortic aneurysm repair 28 mm Valsalva sleeve graft for degenerated homograft AVR with severe aortic insufficiency. 2/21/2013. [facility].     2.  AVR.  Homograft.  2000. Critical aortic stenosis.  [place], [place]  3.  Biventricular pacemaker. 12/29/2014. Syncope unknown etiology. Functioning normally.  4.  Nonischemic cardiomyopathy, resolved post CRT-D  5.  S/P MitraClip 4/2019.  Myxomatous mitral valve disease.  6.  Hypertension.     7.  Syncope. 2014. No recurrence.  8.   Palpitations. Chronic. Related to PVCs now controlled.   9.  PVCs. Chronic.  10.  History of LBBB.    Recommendation/Discussion  1.   The patient's current cardiac status is stable regarding has valvular disease, hypertension and CRT function.  2.  Continue current cardiac therapy.  3.  RTC 9 months.

## 2021-01-11 DIAGNOSIS — Z23 NEED FOR VACCINATION: ICD-10-CM

## 2021-01-21 ENCOUNTER — IMMUNIZATION (OUTPATIENT)
Dept: FAMILY PLANNING/WOMEN'S HEALTH CLINIC | Facility: IMMUNIZATION CENTER | Age: 81
End: 2021-01-21
Attending: INTERNAL MEDICINE
Payer: MEDICARE

## 2021-01-21 DIAGNOSIS — Z23 NEED FOR VACCINATION: ICD-10-CM

## 2021-01-21 DIAGNOSIS — Z23 ENCOUNTER FOR VACCINATION: Primary | ICD-10-CM

## 2021-01-21 PROCEDURE — 0001A PFIZER SARS-COV-2 VACCINE: CPT

## 2021-01-21 PROCEDURE — 91300 PFIZER SARS-COV-2 VACCINE: CPT

## 2021-02-11 ENCOUNTER — IMMUNIZATION (OUTPATIENT)
Dept: FAMILY PLANNING/WOMEN'S HEALTH CLINIC | Facility: IMMUNIZATION CENTER | Age: 81
End: 2021-02-11
Attending: INTERNAL MEDICINE
Payer: MEDICARE

## 2021-02-11 DIAGNOSIS — Z23 ENCOUNTER FOR VACCINATION: Primary | ICD-10-CM

## 2021-02-11 PROCEDURE — 91300 PFIZER SARS-COV-2 VACCINE: CPT

## 2021-02-11 PROCEDURE — 0002A PFIZER SARS-COV-2 VACCINE: CPT

## 2021-03-01 ENCOUNTER — OFFICE VISIT (OUTPATIENT)
Dept: MEDICAL GROUP | Facility: PHYSICIAN GROUP | Age: 81
End: 2021-03-01
Payer: MEDICARE

## 2021-03-01 ENCOUNTER — HOSPITAL ENCOUNTER (OUTPATIENT)
Dept: LAB | Facility: MEDICAL CENTER | Age: 81
End: 2021-03-01
Attending: INTERNAL MEDICINE
Payer: MEDICARE

## 2021-03-01 VITALS
OXYGEN SATURATION: 99 % | HEART RATE: 67 BPM | WEIGHT: 193 LBS | SYSTOLIC BLOOD PRESSURE: 118 MMHG | DIASTOLIC BLOOD PRESSURE: 70 MMHG | RESPIRATION RATE: 14 BRPM | TEMPERATURE: 97.9 F | BODY MASS INDEX: 24.77 KG/M2 | HEIGHT: 74 IN

## 2021-03-01 DIAGNOSIS — Z13.6 SCREENING FOR CARDIOVASCULAR CONDITION: ICD-10-CM

## 2021-03-01 DIAGNOSIS — D64.9 ANEMIA, UNSPECIFIED TYPE: ICD-10-CM

## 2021-03-01 DIAGNOSIS — R73.03 PREDIABETES: ICD-10-CM

## 2021-03-01 DIAGNOSIS — Z95.0 BIVENTRICULAR CARDIAC PACEMAKER IN SITU: Chronic | ICD-10-CM

## 2021-03-01 DIAGNOSIS — Z13.29 SCREENING FOR ENDOCRINE DISORDER: ICD-10-CM

## 2021-03-01 DIAGNOSIS — Z95.2 S/P AVR (AORTIC VALVE REPLACEMENT): ICD-10-CM

## 2021-03-01 DIAGNOSIS — Z85.46 H/O PROSTATE CANCER: ICD-10-CM

## 2021-03-01 DIAGNOSIS — I10 ESSENTIAL HYPERTENSION, BENIGN: Chronic | ICD-10-CM

## 2021-03-01 PROCEDURE — 83540 ASSAY OF IRON: CPT

## 2021-03-01 PROCEDURE — 83036 HEMOGLOBIN GLYCOSYLATED A1C: CPT | Mod: GA

## 2021-03-01 PROCEDURE — 82607 VITAMIN B-12: CPT

## 2021-03-01 PROCEDURE — 36415 COLL VENOUS BLD VENIPUNCTURE: CPT

## 2021-03-01 PROCEDURE — 84460 ALANINE AMINO (ALT) (SGPT): CPT

## 2021-03-01 PROCEDURE — 82728 ASSAY OF FERRITIN: CPT

## 2021-03-01 PROCEDURE — 99214 OFFICE O/P EST MOD 30 MIN: CPT | Performed by: INTERNAL MEDICINE

## 2021-03-01 PROCEDURE — 85025 COMPLETE CBC W/AUTO DIFF WBC: CPT

## 2021-03-01 PROCEDURE — 84443 ASSAY THYROID STIM HORMONE: CPT

## 2021-03-01 PROCEDURE — 80048 BASIC METABOLIC PNL TOTAL CA: CPT

## 2021-03-01 PROCEDURE — 82746 ASSAY OF FOLIC ACID SERUM: CPT

## 2021-03-01 PROCEDURE — 80061 LIPID PANEL: CPT

## 2021-03-01 RX ORDER — LOSARTAN POTASSIUM 50 MG/1
50 TABLET ORAL
Qty: 90 TABLET | Refills: 3 | Status: SHIPPED | OUTPATIENT
Start: 2021-03-01 | End: 2021-12-16

## 2021-03-01 ASSESSMENT — FIBROSIS 4 INDEX: FIB4 SCORE: 3.3

## 2021-03-01 ASSESSMENT — PATIENT HEALTH QUESTIONNAIRE - PHQ9: CLINICAL INTERPRETATION OF PHQ2 SCORE: 0

## 2021-03-01 NOTE — ASSESSMENT & PLAN NOTE
Baseline hemoglobin 13's.  The patient denies any history of bleeding.  Lab tests ordered for follow-up.

## 2021-03-01 NOTE — PROGRESS NOTES
CC: Establish care  Follow-up anemia and hypertension      HPI: This is a 80 y.o. pt.  Pt's medical history is notable for:     Biventricular cardiac pacemaker in situ  This is chronic condition.  Pacemaker was placed in 2015.  The patient is followed by cardiology service.  Patient denies chest pain shortness of breath palpitation fever or chills.    H/O prostate cancer  Status post prostatectomy 2003.  Due to advanced age patient declined further PSA testing.  He also declined urology referral.    S/P AVR (aortic valve replacement)  Patient status post bovine aortic valve replacement in 2000 and 2013.  Patient reported history of bicuspid valve and history of aortic stenosis.  Patient is now followed by cardiology service.  No new symptoms reported.    Anemia  Baseline hemoglobin 13's.  The patient denies any history of bleeding.  Lab tests ordered for follow-up.    Essential hypertension, benign  This is a chronic condition.  Patient currently taking metoprolol and losartan.  No significant side effects reported.  Blood pressure has been well controlled.    Prediabetes  Chronic condition patient currently on diet therapy.  Lab tests ordered for follow-up.          REVIEW OF SYSTEMS:     Constitutional:  no fever / chills   Neurologic: no headaches, no numbness/tingling  Eyes: no changes in vision  ENT: no sore throat, no hearing loss  CV:  no chest pain, no palpitations  Pulmonary: no SOB, no cough          Allergies: Patient has no known allergies.    Current Outpatient Medications Ordered in Epic   Medication Sig Dispense Refill   • losartan (COZAAR) 50 MG Tab Take 1 tablet by mouth every day. 90 tablet 3   • metoprolol SR (TOPROL XL) 25 MG TABLET SR 24 HR Take 1 Tab by mouth every day. 90 Tab 3   • omeprazole (PRILOSEC) 20 MG delayed-release capsule TAKE 1 CAPSULE BY MOUTH  EVERY DAY 90 Cap 3   • Cholecalciferol (VITAMIN D) 400 UNIT TABS Take 400 Units by mouth every day.     • aspirin 81 MG tablet Take 81 mg  by mouth every evening.     • Diclofenac Sodium (VOLTAREN EX) Apply  topically.     • fluticasone (FLONASE) 50 MCG/ACT nasal spray Spray 2 Sprays in nose 1 time daily as needed.     • DiphenhydrAMINE HCl (BENADRYL ALLERGY PO) Take 1 Each by mouth at bedtime as needed. As needed for sleep        No current Epic-ordered facility-administered medications on file.       Past Medical, Social, and Family history reviewed and updated in EPIC     ------------------------------------------------------------------------------     PHYSICAL EXAM:   Vitals:    03/01/21 0935   BP: 118/70   Pulse: 67   Resp: 14   Temp: 36.6 °C (97.9 °F)   SpO2: 99%      Body mass index is 24.78 kg/m².         Constitutional: no acute distress  Neck: supple, no JVD  CV: heart RRR  Resp: normal effort, no wheezing or rales.  GI: abdomen soft, no obvious mass, no tenderness  Neuro: CN 2-12 grossly intact        -----------------------------------------------------------------------------    ASSESSMENT:   1. Essential hypertension, benign     2. Biventricular cardiac pacemaker in situ     3. S/P AVR (aortic valve replacement)     4. Anemia, unspecified type  CBC WITH DIFFERENTIAL    FERRITIN    OCCULT BLOOD FECES IMMUNOASSAY    FOLATE    IRON    VITAMIN B12   5. H/O prostate cancer     6. Prediabetes  HEMOGLOBIN A1C    Basic Metabolic Panel   7. Screening for cardiovascular condition  Lipid Profile   8. Screening for endocrine disorder  ALANINE AMINO-TRANS    TSH    VITAMIN D,25 HYDROXY    MICROALBUMIN CREAT RATIO URINE           MEDICAL DECISION MAKING: DISCUSSION / STATUS / PLAN:    Medically the patient appeared to be stable.  Med list reviewed and updated  Advised the patient continue with current medications  Lab tests ordered.  Advised the patient to call for the results after few days.  Continue follow-up with cardiologist as directed.     Return in about 6 months (around 9/1/2021).       PATIENT EDUCATION:  -If any problems should arise,  patient was advised to contact our office or go to ER to be evaluated.      Please note that this dictation was created using voice recognition software. I have made every reasonable attempt to correct obvious errors, but it is possible there are errors of grammar and possibly content that I did not discover before finalizing the note.

## 2021-03-01 NOTE — ASSESSMENT & PLAN NOTE
This is a chronic condition.  Patient currently taking metoprolol and losartan.  No significant side effects reported.  Blood pressure has been well controlled.

## 2021-03-01 NOTE — ASSESSMENT & PLAN NOTE
Status post prostatectomy 2003.  Due to advanced age patient declined further PSA testing.  He also declined urology referral.

## 2021-03-01 NOTE — ASSESSMENT & PLAN NOTE
This is chronic condition.  Pacemaker was placed in 2015.  The patient is followed by cardiology service.  Patient denies chest pain shortness of breath palpitation fever or chills.

## 2021-03-01 NOTE — ASSESSMENT & PLAN NOTE
Patient status post bovine aortic valve replacement in 2000 and 2013.  Patient reported history of bicuspid valve and history of aortic stenosis.  Patient is now followed by cardiology service.  No new symptoms reported.

## 2021-03-02 ENCOUNTER — HOSPITAL ENCOUNTER (OUTPATIENT)
Facility: MEDICAL CENTER | Age: 81
End: 2021-03-02
Attending: INTERNAL MEDICINE
Payer: MEDICARE

## 2021-03-02 ENCOUNTER — NON-PROVIDER VISIT (OUTPATIENT)
Dept: CARDIOLOGY | Facility: MEDICAL CENTER | Age: 81
End: 2021-03-02
Payer: MEDICARE

## 2021-03-02 DIAGNOSIS — Z95.0 BIVENTRICULAR CARDIAC PACEMAKER IN SITU: Chronic | ICD-10-CM

## 2021-03-02 PROBLEM — D69.6 THROMBOCYTOPENIA (HCC): Status: ACTIVE | Noted: 2021-03-02

## 2021-03-02 LAB
ALT SERPL-CCNC: 14 U/L (ref 2–50)
ANION GAP SERPL CALC-SCNC: 10 MMOL/L (ref 7–16)
BASOPHILS # BLD AUTO: 1.1 % (ref 0–1.8)
BASOPHILS # BLD: 0.05 K/UL (ref 0–0.12)
BUN SERPL-MCNC: 18 MG/DL (ref 8–22)
CALCIUM SERPL-MCNC: 9.7 MG/DL (ref 8.5–10.5)
CHLORIDE SERPL-SCNC: 104 MMOL/L (ref 96–112)
CHOLEST SERPL-MCNC: 160 MG/DL (ref 100–199)
CO2 SERPL-SCNC: 25 MMOL/L (ref 20–33)
CREAT SERPL-MCNC: 1.03 MG/DL (ref 0.5–1.4)
EOSINOPHIL # BLD AUTO: 0.12 K/UL (ref 0–0.51)
EOSINOPHIL NFR BLD: 2.6 % (ref 0–6.9)
ERYTHROCYTE [DISTWIDTH] IN BLOOD BY AUTOMATED COUNT: 48.9 FL (ref 35.9–50)
EST. AVERAGE GLUCOSE BLD GHB EST-MCNC: 126 MG/DL
FASTING STATUS PATIENT QL REPORTED: NORMAL
FERRITIN SERPL-MCNC: 282 NG/ML (ref 22–322)
FOLATE SERPL-MCNC: 11 NG/ML
GLUCOSE SERPL-MCNC: 102 MG/DL (ref 65–99)
HBA1C MFR BLD: 6 % (ref 4–5.6)
HCT VFR BLD AUTO: 44.9 % (ref 42–52)
HDLC SERPL-MCNC: 64 MG/DL
HGB BLD-MCNC: 14.8 G/DL (ref 14–18)
IMM GRANULOCYTES # BLD AUTO: 0.01 K/UL (ref 0–0.11)
IMM GRANULOCYTES NFR BLD AUTO: 0.2 % (ref 0–0.9)
IRON SERPL-MCNC: 98 UG/DL (ref 50–180)
LDLC SERPL CALC-MCNC: 79 MG/DL
LYMPHOCYTES # BLD AUTO: 1.29 K/UL (ref 1–4.8)
LYMPHOCYTES NFR BLD: 28.4 % (ref 22–41)
MCH RBC QN AUTO: 30.5 PG (ref 27–33)
MCHC RBC AUTO-ENTMCNC: 33 G/DL (ref 33.7–35.3)
MCV RBC AUTO: 92.6 FL (ref 81.4–97.8)
MONOCYTES # BLD AUTO: 0.45 K/UL (ref 0–0.85)
MONOCYTES NFR BLD AUTO: 9.9 % (ref 0–13.4)
NEUTROPHILS # BLD AUTO: 2.62 K/UL (ref 1.82–7.42)
NEUTROPHILS NFR BLD: 57.8 % (ref 44–72)
NRBC # BLD AUTO: 0 K/UL
NRBC BLD-RTO: 0 /100 WBC
PLATELET # BLD AUTO: 111 K/UL (ref 164–446)
PMV BLD AUTO: 12.4 FL (ref 9–12.9)
POTASSIUM SERPL-SCNC: 4.3 MMOL/L (ref 3.6–5.5)
RBC # BLD AUTO: 4.85 M/UL (ref 4.7–6.1)
SODIUM SERPL-SCNC: 139 MMOL/L (ref 135–145)
TRIGL SERPL-MCNC: 85 MG/DL (ref 0–149)
TSH SERPL DL<=0.005 MIU/L-ACNC: 1.89 UIU/ML (ref 0.38–5.33)
VIT B12 SERPL-MCNC: 382 PG/ML (ref 211–911)
WBC # BLD AUTO: 4.5 K/UL (ref 4.8–10.8)

## 2021-03-02 PROCEDURE — 93281 PM DEVICE PROGR EVAL MULTI: CPT | Performed by: INTERNAL MEDICINE

## 2021-03-02 PROCEDURE — 82274 ASSAY TEST FOR BLOOD FECAL: CPT

## 2021-03-05 DIAGNOSIS — D64.9 ANEMIA, UNSPECIFIED TYPE: ICD-10-CM

## 2021-03-09 DIAGNOSIS — R19.5 FECAL OCCULT BLOOD TEST POSITIVE: ICD-10-CM

## 2021-03-09 LAB — IMM ASSAY OCC BLD FITOB: POSITIVE

## 2021-08-30 ENCOUNTER — NON-PROVIDER VISIT (OUTPATIENT)
Dept: CARDIOLOGY | Facility: MEDICAL CENTER | Age: 81
End: 2021-08-30
Payer: MEDICARE

## 2021-08-30 DIAGNOSIS — Z95.0 BIVENTRICULAR CARDIAC PACEMAKER IN SITU: Chronic | ICD-10-CM

## 2021-08-30 DIAGNOSIS — I44.0 FIRST DEGREE ATRIOVENTRICULAR BLOCK: ICD-10-CM

## 2021-08-30 PROCEDURE — 93281 PM DEVICE PROGR EVAL MULTI: CPT | Performed by: INTERNAL MEDICINE

## 2021-09-02 ENCOUNTER — HOSPITAL ENCOUNTER (OUTPATIENT)
Dept: LAB | Facility: MEDICAL CENTER | Age: 81
End: 2021-09-02
Attending: INTERNAL MEDICINE
Payer: MEDICARE

## 2021-09-02 ENCOUNTER — TELEPHONE (OUTPATIENT)
Dept: MEDICAL GROUP | Facility: PHYSICIAN GROUP | Age: 81
End: 2021-09-02

## 2021-09-02 ENCOUNTER — OFFICE VISIT (OUTPATIENT)
Dept: MEDICAL GROUP | Facility: PHYSICIAN GROUP | Age: 81
End: 2021-09-02
Payer: MEDICARE

## 2021-09-02 VITALS
BODY MASS INDEX: 25.05 KG/M2 | RESPIRATION RATE: 16 BRPM | HEIGHT: 74 IN | DIASTOLIC BLOOD PRESSURE: 62 MMHG | HEART RATE: 72 BPM | WEIGHT: 195.19 LBS | TEMPERATURE: 97.5 F | SYSTOLIC BLOOD PRESSURE: 138 MMHG | OXYGEN SATURATION: 98 %

## 2021-09-02 DIAGNOSIS — Z95.0 BIVENTRICULAR CARDIAC PACEMAKER IN SITU: Chronic | ICD-10-CM

## 2021-09-02 DIAGNOSIS — Z13.6 SCREENING FOR CARDIOVASCULAR CONDITION: ICD-10-CM

## 2021-09-02 DIAGNOSIS — Z95.2 S/P AVR (AORTIC VALVE REPLACEMENT): ICD-10-CM

## 2021-09-02 DIAGNOSIS — D69.6 THROMBOCYTOPENIA (HCC): ICD-10-CM

## 2021-09-02 DIAGNOSIS — R73.03 PREDIABETES: ICD-10-CM

## 2021-09-02 DIAGNOSIS — I10 ESSENTIAL HYPERTENSION: ICD-10-CM

## 2021-09-02 DIAGNOSIS — N28.9 RENAL INSUFFICIENCY: ICD-10-CM

## 2021-09-02 PROBLEM — Z98.890 S/P MITRAL VALVE CLIP IMPLANTATION: Chronic | Status: ACTIVE | Noted: 2017-03-16

## 2021-09-02 PROBLEM — Z95.818 S/P MITRAL VALVE CLIP IMPLANTATION: Chronic | Status: ACTIVE | Noted: 2017-03-16

## 2021-09-02 LAB
ALT SERPL-CCNC: 18 U/L (ref 2–50)
ANION GAP SERPL CALC-SCNC: 10 MMOL/L (ref 7–16)
BUN SERPL-MCNC: 23 MG/DL (ref 8–22)
CALCIUM SERPL-MCNC: 9.7 MG/DL (ref 8.5–10.5)
CHLORIDE SERPL-SCNC: 106 MMOL/L (ref 96–112)
CHOLEST SERPL-MCNC: 179 MG/DL (ref 100–199)
CO2 SERPL-SCNC: 22 MMOL/L (ref 20–33)
CREAT SERPL-MCNC: 1.23 MG/DL (ref 0.5–1.4)
ERYTHROCYTE [DISTWIDTH] IN BLOOD BY AUTOMATED COUNT: 46.9 FL (ref 35.9–50)
EST. AVERAGE GLUCOSE BLD GHB EST-MCNC: 120 MG/DL
FASTING STATUS PATIENT QL REPORTED: NORMAL
GLUCOSE SERPL-MCNC: 107 MG/DL (ref 65–99)
HBA1C MFR BLD: 5.8 % (ref 4–5.6)
HCT VFR BLD AUTO: 44.3 % (ref 42–52)
HDLC SERPL-MCNC: 57 MG/DL
HGB BLD-MCNC: 14.6 G/DL (ref 14–18)
LDLC SERPL CALC-MCNC: 99 MG/DL
MCH RBC QN AUTO: 30 PG (ref 27–33)
MCHC RBC AUTO-ENTMCNC: 33 G/DL (ref 33.7–35.3)
MCV RBC AUTO: 91 FL (ref 81.4–97.8)
PLATELET # BLD AUTO: 103 K/UL (ref 164–446)
PMV BLD AUTO: 12.7 FL (ref 9–12.9)
POTASSIUM SERPL-SCNC: 4.7 MMOL/L (ref 3.6–5.5)
RBC # BLD AUTO: 4.87 M/UL (ref 4.7–6.1)
SODIUM SERPL-SCNC: 138 MMOL/L (ref 135–145)
TRIGL SERPL-MCNC: 113 MG/DL (ref 0–149)
WBC # BLD AUTO: 4.6 K/UL (ref 4.8–10.8)

## 2021-09-02 PROCEDURE — 80048 BASIC METABOLIC PNL TOTAL CA: CPT

## 2021-09-02 PROCEDURE — 36415 COLL VENOUS BLD VENIPUNCTURE: CPT

## 2021-09-02 PROCEDURE — 80061 LIPID PANEL: CPT

## 2021-09-02 PROCEDURE — 85027 COMPLETE CBC AUTOMATED: CPT

## 2021-09-02 PROCEDURE — 83036 HEMOGLOBIN GLYCOSYLATED A1C: CPT | Mod: GA

## 2021-09-02 PROCEDURE — 99214 OFFICE O/P EST MOD 30 MIN: CPT | Performed by: INTERNAL MEDICINE

## 2021-09-02 PROCEDURE — 84460 ALANINE AMINO (ALT) (SGPT): CPT

## 2021-09-02 NOTE — ASSESSMENT & PLAN NOTE
Surgery completed in 2000.  The patient is now followed by cardiology service.  Patient denies chest pain shortness of breath palpitation or near syncope.

## 2021-09-02 NOTE — ASSESSMENT & PLAN NOTE
Chronic stable condition.  Pacemaker being followed by cardiology service.  No new symptoms reported.

## 2021-09-02 NOTE — PROGRESS NOTES
CC: Follow-up hypertension  Request lab test      HPI: This is a 81 y.o. pt.  Pt's medical history is notable for:     S/P AVR (aortic valve replacement)  Surgery completed in 2000.  The patient is now followed by cardiology service.  Patient denies chest pain shortness of breath palpitation or near syncope.    Biventricular cardiac pacemaker in situ  Chronic stable condition.  Pacemaker being followed by cardiology service.  No new symptoms reported.    Essential hypertension  This is a chronic condition.  The patient is currently taking losartan and metoprolol.    Prediabetes  Noted with previous lab test.  Recommend lab tests to be done for follow-up.    Thrombocytopenia (HCC)  Previous platelet level noted in the 110s range.  Patient denies any history of bleeding.  Patient currently taking aspirin.  Recommend repeat CBC for follow-up.          REVIEW OF SYSTEMS:     Constitutional:  no fever / chills   Neurologic: no headaches  Eyes: no changes in vision  ENT: no sore throat, no hearing loss  CV:  no chest pain, no palpitations  Pulmonary: no SOB, no cough          Allergies: Patient has no known allergies.    Current Outpatient Medications Ordered in Epic   Medication Sig Dispense Refill   • losartan (COZAAR) 50 MG Tab Take 1 tablet by mouth every day. 90 tablet 3   • metoprolol SR (TOPROL XL) 25 MG TABLET SR 24 HR Take 1 Tab by mouth every day. 90 Tab 3   • omeprazole (PRILOSEC) 20 MG delayed-release capsule TAKE 1 CAPSULE BY MOUTH  EVERY DAY 90 Cap 3   • fluticasone (FLONASE) 50 MCG/ACT nasal spray Spray 2 Sprays in nose 1 time daily as needed.     • Cholecalciferol (VITAMIN D) 400 UNIT TABS Take 400 Units by mouth every day.     • DiphenhydrAMINE HCl (BENADRYL ALLERGY PO) Take 1 Each by mouth at bedtime as needed. As needed for sleep      • aspirin 81 MG tablet Take 81 mg by mouth every evening.       No current Epic-ordered facility-administered medications on file.       Past Medical, Social, and Family  "history reviewed and updated in EPIC     ------------------------------------------------------------------------------     PHYSICAL EXAM:   Vitals:    09/02/21 0926   BP: 138/62   Pulse: 72   Resp: 16   Temp: 36.4 °C (97.5 °F)   SpO2: 98%        Vitals:    09/02/21 0926   BP: 138/62   Weight: 88.5 kg (195 lb 3 oz)   Height: 1.88 m (6' 2\")         Body mass index is 25.06 kg/m².    Constitutional: no acute distress  CV: heart RRR  Resp: normal effort, no wheezing or rales.  GI: abdomen soft, no obvious mass, no tenderness  Neuro: CN 2-12 grossly intact        -----------------------------------------------------------------------------    ASSESSMENT:   1. Prediabetes  HEMOGLOBIN A1C    Basic Metabolic Panel   2. Essential hypertension     3. Thrombocytopenia (HCC)  CBC WITHOUT DIFFERENTIAL   4. S/P AVR (aortic valve replacement)     5. Biventricular cardiac pacemaker in situ     6. Screening for cardiovascular condition  ALANINE AMINO-TRANS    Lipid Profile           MEDICAL DECISION MAKING: DISCUSSION / STATUS / PLAN:    Medically patient appear to be stable.  Vital signs are stable today.  Advised the patient to continue with current medications  Continue follow-up with cardiology and to have his pacemaker checked on regular basis  Patient reported that he will be scheduled for colonoscopy September 15.  Advised the patient to keep the appointment.    Patient is due for shingles vaccine and Tdap.  The patient would like to get those at the local pharmacy as he is a Medicare recipient.     Return in about 6 months (around 3/2/2022) for Annual Wellness exam.     -If any problems should arise, patient was advised to contact our office or go to ER to be evaluated.    Please note that this dictation was created using voice recognition software. I have made every reasonable attempt to correct obvious errors, but it is possible there are errors of grammar and possibly content that I did not discover before finalizing the " note.

## 2021-09-03 NOTE — TELEPHONE ENCOUNTER
----- Message from Rao Quezada M.D. sent at 9/2/2021  4:36 PM PDT -----  Please notify patient about their results:  labs back    Sugar level slightly high= prediabetes  Please continue with low sweet  low carb diet ,  continue to exercise / walking regularly.    Cholesterol 179 normal    Platelet levels [one of the clotting products and blood] low.  There was noted before.  No significant change noted.  This could also be affected by the aspirin  Recommend to continue to monitor.    Kidney function test slightly low  Recommend patient to repeat a nonfasting kidney blood test for follow-up trends in approximately 2 weeks.  Lab has been ordered

## 2021-09-16 ENCOUNTER — HOSPITAL ENCOUNTER (OUTPATIENT)
Dept: LAB | Facility: MEDICAL CENTER | Age: 81
End: 2021-09-16
Attending: INTERNAL MEDICINE
Payer: MEDICARE

## 2021-09-16 DIAGNOSIS — N28.9 RENAL INSUFFICIENCY: ICD-10-CM

## 2021-09-16 PROBLEM — N18.31 STAGE 3A CHRONIC KIDNEY DISEASE: Status: ACTIVE | Noted: 2021-09-02

## 2021-09-16 LAB
ALBUMIN SERPL BCP-MCNC: 3.9 G/DL (ref 3.2–4.9)
BUN SERPL-MCNC: 26 MG/DL (ref 8–22)
CALCIUM SERPL-MCNC: 9.7 MG/DL (ref 8.5–10.5)
CHLORIDE SERPL-SCNC: 109 MMOL/L (ref 96–112)
CO2 SERPL-SCNC: 22 MMOL/L (ref 20–33)
CREAT SERPL-MCNC: 1.18 MG/DL (ref 0.5–1.4)
GLUCOSE SERPL-MCNC: 98 MG/DL (ref 65–99)
PHOSPHATE SERPL-MCNC: 3 MG/DL (ref 2.5–4.5)
POTASSIUM SERPL-SCNC: 4.3 MMOL/L (ref 3.6–5.5)
SODIUM SERPL-SCNC: 141 MMOL/L (ref 135–145)

## 2021-09-16 PROCEDURE — 80069 RENAL FUNCTION PANEL: CPT

## 2021-09-16 PROCEDURE — 36415 COLL VENOUS BLD VENIPUNCTURE: CPT

## 2021-10-04 ENCOUNTER — PATIENT MESSAGE (OUTPATIENT)
Dept: MEDICAL GROUP | Facility: PHYSICIAN GROUP | Age: 81
End: 2021-10-04

## 2021-10-04 RX ORDER — OMEPRAZOLE 20 MG/1
20 CAPSULE, DELAYED RELEASE ORAL
Qty: 90 CAPSULE | Refills: 3 | Status: SHIPPED | OUTPATIENT
Start: 2021-10-04 | End: 2022-07-25

## 2021-10-04 NOTE — PATIENT COMMUNICATION
Dear Ross,     Your prescription for   Requested Prescriptions     Pending Prescriptions Disp Refills   • omeprazole (PRILOSEC) 20 MG delayed-release capsule 90 Capsule 3     Sig: Take 1 Capsule by mouth every day.        has been sent to the   [facility] [facility] [facility] - [facility] [address], [address]  [address] [facility], [address] [address]  Phone: [phone] Fax: [phone]    [id number] - [place], [place] - [address] [place] [address] AT [address]  [facility] [address]  Phone: [phone] Fax: [phone]      Please contact your pharmacy to see when it will be ready for you to .      Thank you,     Fabienne Hernandez, Med Ass't

## 2021-10-21 ENCOUNTER — TELEPHONE (OUTPATIENT)
Dept: CARDIOLOGY | Facility: MEDICAL CENTER | Age: 81
End: 2021-10-21

## 2021-10-21 NOTE — TELEPHONE ENCOUNTER
SW    Pt called, he just received a [employer] at home transmitter from [employer].  Needs clarification.     john - [phone]     Thank you,   Suzanne MARY,.

## 2021-10-22 NOTE — TELEPHONE ENCOUNTER
Spoke with patient--all questions answered.  Agreeable to set up home monitor.    [employer] remote will contact patient to assist with setup.

## 2021-11-01 ENCOUNTER — OFFICE VISIT (OUTPATIENT)
Dept: CARDIOLOGY | Facility: MEDICAL CENTER | Age: 81
End: 2021-11-01
Payer: MEDICARE

## 2021-11-01 ENCOUNTER — TELEPHONE (OUTPATIENT)
Dept: CARDIOLOGY | Facility: MEDICAL CENTER | Age: 81
End: 2021-11-01

## 2021-11-01 VITALS
HEIGHT: 75 IN | BODY MASS INDEX: 24.54 KG/M2 | RESPIRATION RATE: 16 BRPM | WEIGHT: 197.4 LBS | SYSTOLIC BLOOD PRESSURE: 140 MMHG | DIASTOLIC BLOOD PRESSURE: 76 MMHG | HEART RATE: 74 BPM | OXYGEN SATURATION: 95 %

## 2021-11-01 DIAGNOSIS — Z86.79 S/P THORACIC AORTIC ANEURYSM REPAIR: Chronic | ICD-10-CM

## 2021-11-01 DIAGNOSIS — Z95.2 S/P AVR (AORTIC VALVE REPLACEMENT): Chronic | ICD-10-CM

## 2021-11-01 DIAGNOSIS — Z95.818 S/P MITRAL VALVE CLIP IMPLANTATION: Chronic | ICD-10-CM

## 2021-11-01 DIAGNOSIS — Z95.0 BIVENTRICULAR CARDIAC PACEMAKER IN SITU: Chronic | ICD-10-CM

## 2021-11-01 DIAGNOSIS — I10 ESSENTIAL HYPERTENSION: Chronic | ICD-10-CM

## 2021-11-01 DIAGNOSIS — Z98.890 S/P MITRAL VALVE CLIP IMPLANTATION: Chronic | ICD-10-CM

## 2021-11-01 DIAGNOSIS — Z98.890 S/P THORACIC AORTIC ANEURYSM REPAIR: Chronic | ICD-10-CM

## 2021-11-01 PROCEDURE — 99214 OFFICE O/P EST MOD 30 MIN: CPT | Performed by: INTERNAL MEDICINE

## 2021-11-01 ASSESSMENT — ENCOUNTER SYMPTOMS
DIZZINESS: 0
PALPITATIONS: 0
MYALGIAS: 0
SHORTNESS OF BREATH: 0
PND: 0
LOSS OF CONSCIOUSNESS: 0
ORTHOPNEA: 0
COUGH: 0

## 2021-11-01 NOTE — TELEPHONE ENCOUNTER
Returned call to pt re: home monitor. Confirmed that a full report is transmitted every 91 days and to leave monitor plugged in for daily transmissions if needed. Pt verbalized understanding.

## 2021-11-01 NOTE — PROGRESS NOTES
Chief Complaint   Patient presents with   • Aortic Stenosis     Dx: S/P AVR (aortic valve replacement)   • Shortness of Breath     Dx: Dyspnea on exertion   • Hypertension     Dx: Essential hypertension       Subjective     Ross Mcneal is a 81 y.o. male who presents today for followup nonischemic cardiomyopathy, hypertension, CRT-D P with recovered EF, LBBB, aortic valve disease, status post aortic valve replacement and ascending aortic aneurysm repair, MitraClip and ventricular ectopy.    Since 12/1/2020 patient denies any cardiac problems or symptoms. No chest pain, shortness of breath, palpitations or syncope. Monitors BP which generally runs in the 120s sometimes 130s.    Past Medical History:   Diagnosis Date   • Arthritis     hands   • Basal cell carcinoma of lip 11/27/2013   • Biventricular cardiac pacemaker in situ 3/4/2015   • Breath shortness     with exertion   • Cancer (HCC)     prostate   • GERD (gastroesophageal reflux disease) 10/17/2013   • Heart burn    • Hypertension     • Indigestion    • Insomnia 10/17/2013   • LBBB (left bundle branch block)     • MEDICAL HOME    • Pacemaker    • Prostate cancer (HCC) 2003   • PVCs (premature ventricular contractions) 3/4/2015   • S/P AVR (aortic valve replacement) 2/21/2013    [facility]; [place], [place]   • S/P prostatectomy 2003   • S/P thoracic aortic aneurysm repair 2/21/2013   • Seasonal allergies 10/17/2013   • Valvular heart disease      Past Surgical History:   Procedure Laterality Date   • TRANSCATHETER MITRAL VALVE REPAIR Right 4/22/2019    Procedure: REPAIR, MITRAL VALVE, TRANSCATHETER- AND ANY ASSOCIATED PROCEDURES INCLUDING ARTAIL SEPTAL DEFECT CLOSURE AND PERMANENT PACE MAKER PLACEMENT;  Surgeon: Neto Ramirez M.D.;  Location: SURGERY [facility];  Service: Cardiac   • TRAVIS  4/22/2019    Procedure: ECHOCARDIOGRAM, TRANSESOPHAGEAL;  Surgeon: Neto Ramirez M.D.;  Location: SURGERY [facility];  Service:  Cardiac   • RECOVERY  2014    Performed by Cath-Recovery Surgery at SURGERY SAME DAY [facility]   • AORTIC VALVE REPLACEMENT  13    Bovine tissur valve with ascending aortic repair..  Done in [place].   • [facility] CARDIAC CATH  2013    normal coronaries prior to AVR.   • OTHER      carcinoma removed from lip   • PROSTATECTOMY, RADIAL  .    prostate cancer.   • APPENDECTOMY     • MITRAL VALVE REPLACE      S/P lisa Clip X1   • OTHER CARDIAC SURGERY   and     aVR. Thoracic aortic aneurysm repair.     Family History   Problem Relation Age of Onset   • Other Mother          at age 82. Parkinson's disease.   • Cancer Father          at age 68. Prostate cancer.   • Cancer Sister         Lung   • Heart Disease Neg Hx      Social History     Socioeconomic History   • Marital status:      Spouse name: Not on file   • Number of children: Not on file   • Years of education: Not on file   • Highest education level: Not on file   Occupational History   • Not on file   Tobacco Use   • Smoking status: Never Smoker   • Smokeless tobacco: Never Used   Vaping Use   • Vaping Use: Never used   Substance and Sexual Activity   • Alcohol use: Yes     Alcohol/week: 0.6 oz     Types: 1 Glasses of wine per week     Comment: 1/day   • Drug use: No   • Sexual activity: Yes     Partners: Female   Other Topics Concern   • Not on file   Social History Narrative    Retired . . Moved to [place] in .     Social Determinants of Health     Financial Resource Strain:    • Difficulty of Paying Living Expenses:    Food Insecurity:    • Worried About Running Out of Food in the Last Year:    • Ran Out of Food in the Last Year:    Transportation Needs:    • Lack of Transportation (Medical):    • Lack of Transportation (Non-Medical):    Physical Activity:    • Days of Exercise per Week:    • Minutes of Exercise per Session:    Stress:    • Feeling of Stress :    Social Connections:    •  "Frequency of Communication with Friends and Family:    • Frequency of Social Gatherings with Friends and Family:    • Attends [employer] Services:    • Active Member of Clubs or Organizations:    • Attends Club or Organization Meetings:    • Marital Status:    Intimate Partner Violence:    • Fear of Current or Ex-Partner:    • Emotionally Abused:    • Physically Abused:    • Sexually Abused:      No Known Allergies  Outpatient Encounter Medications as of 11/1/2021   Medication Sig Dispense Refill   • omeprazole (PRILOSEC) 20 MG delayed-release capsule Take 1 Capsule by mouth every day. 90 Capsule 3   • losartan (COZAAR) 50 MG Tab Take 1 tablet by mouth every day. 90 tablet 3   • metoprolol SR (TOPROL XL) 25 MG TABLET SR 24 HR Take 1 Tab by mouth every day. 90 Tab 3   • fluticasone (FLONASE) 50 MCG/ACT nasal spray Spray 2 Sprays in nose 1 time daily as needed.     • Cholecalciferol (VITAMIN D) 400 UNIT TABS Take 400 Units by mouth every day.     • DiphenhydrAMINE HCl (BENADRYL ALLERGY PO) Take 1 Each by mouth at bedtime as needed. As needed for sleep      • aspirin 81 MG tablet Take 81 mg by mouth every evening.       No facility-administered encounter medications on file as of 11/1/2021.     Review of Systems   Respiratory: Negative for cough and shortness of breath.    Cardiovascular: Negative for chest pain, palpitations, orthopnea, leg swelling and PND.   Musculoskeletal: Negative for myalgias.   Neurological: Negative for dizziness and loss of consciousness.              Objective     /76 (BP Location: Left arm, Patient Position: Sitting, BP Cuff Size: Adult)   Pulse 74   Resp 16   Ht 1.905 m (6' 3\")   Wt 89.5 kg (197 lb 6.4 oz)   SpO2 95%   BMI 24.67 kg/m²     Physical Exam  Vitals reviewed.   Constitutional:       General: He is not in acute distress.     Appearance: He is well-developed.   Eyes:      Conjunctiva/sclera: Conjunctivae normal.      Pupils: Pupils are equal, round, and reactive to " light.   Neck:      Vascular: No JVD.   Cardiovascular:      Rate and Rhythm: Normal rate and regular rhythm.      Pulses:           Carotid pulses are 2+ on the right side and 2+ on the left side.     Heart sounds: Murmur heard.   No friction rub. No gallop.    Pulmonary:      Effort: Pulmonary effort is normal. No accessory muscle usage or respiratory distress.      Breath sounds: Normal breath sounds. No wheezing or rales.   Abdominal:      General: There is no distension.      Palpations: Abdomen is soft. There is no mass.      Tenderness: There is no abdominal tenderness.   Musculoskeletal:      Cervical back: Normal range of motion and neck supple.   Skin:     General: Skin is warm and dry.      Findings: No rash.      Nails: There is no clubbing.   Neurological:      Mental Status: He is alert and oriented to person, place, and time.   Psychiatric:         Behavior: Behavior normal.              02/27/2013 ECHOCARDIOGRAM  EF 30-40%. Aortic valve prosthesis. 1-2+ MR. 2+ TR.     12/12/2014 ECHOCARDIOGRAM  Pt has a prosthetic aortic valve with normal motion.  Transvalvular gradients are - Peak: 20 mmHg Mean: 8 mmHg.  Myxomatous change with prolapse of the both mitral leaflet was present.  Eccentric, more than one jets of mild to moderate mitral regurgitation.  Mild to moderately dilated left atrium.  Normal left ventricular chamber size.  Mild concentric left ventricular hypertrophy.  Difficult to determine systolic function due to irregular heart rhythm   but appeared preserved.  Septal bounces probably from conduction abnormality (LBBB).  Normal right ventricular systolic function.  Unable to estimate pulmonary artery pressure due to an inadequate   tricuspid regurgitant jet.     11/11/2015 ECHOCARDIOGRAM  Bovine bioprosthetic aortic valve; normal function.  Normal left ventricular systolic function.  Left ventricular ejection fraction is visually estimated to be 70%.  Asymmetric septal hypertrophy.  Mitral  valve prolapse of anterior and posterior leaflets with   myxomatous changes.  Moderate eccentric mitral regurgitation.  Right heart pressures are normal.     11/01/2017 ECHOCARDIOGRAM   Normal left   ventricular systolic function.  Left ventricular ejection fraction is visually estimated to be 70%.  Myxomatous mitral valve leaflet thickening with prolapse of the   anterior and posterior leaflets.  Severe eccentric mitral regurgitation.  Normal function of mechanical prosthetic valve.  Right heart pressures are normal.  Pacer/ICD wire seen in right ventricle.     02/12/2019 TRAVIS  The left ventricle was normal in size and function.  Left ventricular ejection fraction is visually estimated to be 70%.  Myxomatous mitral valve with prolapse of both the anterior and   posterior leaflets.  Severe mitral regurgitation.  Known bioprosthetic aortic valve that is functioning normally.    ECHOCARDIOGRAM 05/27/2019  Prior study done 4/23/19 - compared to the report of the study done -   there has been no significant change.   Normal left ventricular systolic function.  Left ventricular ejection fraction is visually estimated to be 65%.  Pacer/ICD wire seen in right ventricle.  Known transcatheter mitral valve repair which is functioning normally   with appropriate transvalvular gradient.  Mean transvalvular gradient is 2 mmHg   Normal function of bioprosthetic valve which is working well.  Vmax is  2.40 m/s. Transvalvular gradients are - Peak: 23 mmHg, Mean:   11 mmHg.   Right ventricular systolic pressure is estimated to be 20 mmHg.    ECHOCARDIOGRAM 04/01/2020  Prior study done 5/27/19 - compared to the report of the study done - there has been no significant change.   Normal left ventricular systolic function.  Left ventricular ejection fraction is visually estimated to be 65%.  Known transcatheter mitral valve repair which is functioning normally with appropriate transvalvular gradient.  Mild mitral regurgitation.  Mean  transvalvular gradient is 2 mmHg  Normal function of bioprosthetic aortic valve.  Vmax is 2.23  m/s. Transvalvular gradients are - Peak: 20 mmHg, Mean: 11 mmHg  Right ventricular systolic pressure is estimated to be 20 mmHg.    /08/2013 EKG: Normal sinus rhythm, rate 67. First degree AV block. Left bundle branch block. Unchanged since 2/22/2013.     12/08/2014 EKG: Normal sinus rhythm, rate 92. First degree AV block. Left bundle branch block. Intermittent left anterior hemiblock. Isolated PVCs.    Assessment & Plan     1. S/P AVR (aortic valve replacement)     2. S/P mitral valve clip implantation     3. Essential hypertension     4. Biventricular cardiac pacemaker in situ     5. S/P thoracic aortic aneurysm repair         Medical Decision Making: Today's Assessment/Status/Plan:   Assessment  1.  S/P redo AVR 2/21/2013 23 mm composite pericardial tissue valve ascending aortic aneurysm repair 28 mm Valsalva sleeve graft for degenerated homograft AVR with severe aortic insufficiency. [facility].     2.  AVR.  Homograft.  2000. Critical aortic stenosis.  [place], [place]  3.  CRT-P 12/29/2014.   4.  Nonischemic cardiomyopathy, resolved post CRT-D  5.  S/P MitraClip 4/2019.  Myxomatous mitral valve disease.  6.  Hypertension.     7.  Syncope. 2014. No recurrence.  8.  Palpitations. Chronic. Related to PVCs now controlled.   9.  PVCs. Chronic.  10.  History of LBBB.    Recommendation/Discussion  1.   The patient's current cardiac status is stable regarding has valvular disease, hypertension and CRT function.  2.  Continue current cardiac therapy.  3.  RTC 9 months.

## 2021-12-06 RX ORDER — METOPROLOL SUCCINATE 25 MG/1
25 TABLET, EXTENDED RELEASE ORAL DAILY
Qty: 90 TABLET | Refills: 0 | Status: SHIPPED | OUTPATIENT
Start: 2021-12-06 | End: 2022-02-07

## 2021-12-16 RX ORDER — LOSARTAN POTASSIUM 50 MG/1
TABLET ORAL
Qty: 90 TABLET | Refills: 3 | Status: SHIPPED | OUTPATIENT
Start: 2021-12-16 | End: 2022-11-04

## 2022-02-07 RX ORDER — METOPROLOL SUCCINATE 25 MG/1
TABLET, EXTENDED RELEASE ORAL
Qty: 90 TABLET | Refills: 3 | Status: SHIPPED | OUTPATIENT
Start: 2022-02-07 | End: 2023-01-11

## 2022-03-01 ENCOUNTER — NON-PROVIDER VISIT (OUTPATIENT)
Dept: CARDIOLOGY | Facility: MEDICAL CENTER | Age: 82
End: 2022-03-01
Payer: MEDICARE

## 2022-03-01 DIAGNOSIS — I44.2 AV BLOCK, 3RD DEGREE (HCC): ICD-10-CM

## 2022-03-01 DIAGNOSIS — Z95.0 BIVENTRICULAR CARDIAC PACEMAKER IN SITU: Chronic | ICD-10-CM

## 2022-03-01 PROCEDURE — 93281 PM DEVICE PROGR EVAL MULTI: CPT | Performed by: INTERNAL MEDICINE

## 2022-05-31 ENCOUNTER — OFFICE VISIT (OUTPATIENT)
Dept: URGENT CARE | Facility: PHYSICIAN GROUP | Age: 82
End: 2022-05-31
Payer: MEDICARE

## 2022-05-31 VITALS
HEART RATE: 73 BPM | WEIGHT: 215 LBS | BODY MASS INDEX: 27.59 KG/M2 | TEMPERATURE: 98.1 F | SYSTOLIC BLOOD PRESSURE: 142 MMHG | RESPIRATION RATE: 14 BRPM | OXYGEN SATURATION: 94 % | HEIGHT: 74 IN | DIASTOLIC BLOOD PRESSURE: 82 MMHG

## 2022-05-31 DIAGNOSIS — S61.317A LACERATION OF LEFT LITTLE FINGER WITHOUT FOREIGN BODY WITH DAMAGE TO NAIL, INITIAL ENCOUNTER: ICD-10-CM

## 2022-05-31 PROCEDURE — 90714 TD VACC NO PRESV 7 YRS+ IM: CPT | Performed by: PHYSICIAN ASSISTANT

## 2022-05-31 PROCEDURE — 12032 INTMD RPR S/A/T/EXT 2.6-7.5: CPT | Performed by: PHYSICIAN ASSISTANT

## 2022-05-31 PROCEDURE — 90471 IMMUNIZATION ADMIN: CPT | Performed by: PHYSICIAN ASSISTANT

## 2022-05-31 RX ORDER — CEPHALEXIN 500 MG/1
500 CAPSULE ORAL 4 TIMES DAILY
Qty: 20 CAPSULE | Refills: 0 | Status: SHIPPED | OUTPATIENT
Start: 2022-05-31 | End: 2022-06-05

## 2022-05-31 ASSESSMENT — ENCOUNTER SYMPTOMS
FEVER: 0
VOMITING: 0
CHILLS: 0
NAUSEA: 0

## 2022-05-31 NOTE — PROCEDURES
Laceration Repair    Date/Time: 5/31/2022 11:05 AM  Performed by: Teto Weber P.A.-C.  Authorized by: Teto Weber P.A.-C.   Body area: upper extremity  Location details: left small finger  Laceration length: 5 cm  Foreign bodies: no foreign bodies  Tendon involvement: none  Nerve involvement: none  Vascular damage: yes (superficial venule)  Anesthesia: digital block    Anesthesia:  Local Anesthetic: lidocaine 1% without epinephrine  Anesthetic total: 4 mL    Sedation:  Patient sedated: no    Irrigation solution: tap water  Irrigation method: tap and syringe  Amount of cleaning: standard  Debridement: none  Degree of undermining: none  Skin closure: 4-0 nylon and 5-0 nylon  Number of sutures: 14  Technique: simple  Approximation: close  Approximation difficulty: complex  Dressing: pressure dressing and antibiotic ointment  Patient tolerance: patient tolerated the procedure well with no immediate complications

## 2022-05-31 NOTE — PROGRESS NOTES
Subjective:   Ross Mcneal is a 82 y.o. male who presents for Finger Injury (Injured with a piece of wood x 1 hour ago bleeding)        Presents for evaluation of laceration to the left little finger that occurred an hour ago.  He states that his router kicked back a piece of wood while he was woodworking-piece of wood cut finger.  He denies retained foreign body.  Date of last tetanus is unknown.  He takes a baby aspirin daily, but is not on any other blood thinners.    Review of Systems   Constitutional: Negative for chills and fever.   Gastrointestinal: Negative for nausea and vomiting.       PMH:  has a past medical history of Arthritis, Basal cell carcinoma of lip (11/27/2013), Biventricular cardiac pacemaker in situ (3/4/2015), Breath shortness, Cancer ([facility]), GERD (gastroesophageal reflux disease) (10/17/2013), Heart burn, Hypertension ( ), Indigestion, Insomnia (10/17/2013), LBBB (left bundle branch block) ( ), MEDICAL HOME, Pacemaker, Prostate cancer ([facility]) (2003), PVCs (premature ventricular contractions) (3/4/2015), S/P AVR (aortic valve replacement) (2/21/2013), S/P prostatectomy (2003), S/P thoracic aortic aneurysm repair (2/21/2013), Seasonal allergies (10/17/2013), and Valvular heart disease.  MEDS:   Current Outpatient Medications:   •  cephALEXin (KEFLEX) 500 MG Cap, Take 1 Capsule by mouth 4 times a day for 5 days., Disp: 20 Capsule, Rfl: 0  •  metoprolol SR (TOPROL XL) 25 MG TABLET SR 24 HR, TAKE 1 TABLET BY MOUTH  DAILY, Disp: 90 Tablet, Rfl: 3  •  losartan (COZAAR) 50 MG Tab, TAKE 1 TABLET BY MOUTH  DAILY, Disp: 90 Tablet, Rfl: 3  •  omeprazole (PRILOSEC) 20 MG delayed-release capsule, Take 1 Capsule by mouth every day., Disp: 90 Capsule, Rfl: 3  •  fluticasone (FLONASE) 50 MCG/ACT nasal spray, Spray 2 Sprays in nose 1 time daily as needed., Disp: , Rfl:   •  Cholecalciferol (VITAMIN D) 400 UNIT TABS, Take 400 Units by mouth every day., Disp: , Rfl:   •  DiphenhydrAMINE HCl (BENADRYL  "ALLERGY PO), Take 1 Each by mouth at bedtime as needed. As needed for sleep , Disp: , Rfl:   •  aspirin 81 MG tablet, Take 81 mg by mouth every evening., Disp: , Rfl:   ALLERGIES: No Known Allergies  SURGHX:   Past Surgical History:   Procedure Laterality Date   • TRANSCATHETER MITRAL VALVE REPAIR Right 4/22/2019    Procedure: REPAIR, MITRAL VALVE, TRANSCATHETER- AND ANY ASSOCIATED PROCEDURES INCLUDING ARTAIL SEPTAL DEFECT CLOSURE AND PERMANENT PACE MAKER PLACEMENT;  Surgeon: Neto Ramirez M.D.;  Location: SURGERY [facility];  Service: Cardiac   • TRAVIS  4/22/2019    Procedure: ECHOCARDIOGRAM, TRANSESOPHAGEAL;  Surgeon: Neto Ramirez M.D.;  Location: SURGERY [facility];  Service: Cardiac   • RECOVERY  12/29/2014    Performed by Cath-Recovery Surgery at SURGERY SAME DAY [facility]   • AORTIC VALVE REPLACEMENT  2/21/13    Bovine tissur valve with ascending aortic repair..  Done in [place].   • [facility] CARDIAC CATH  2/2013    normal coronaries prior to AVR.   • OTHER  2013    carcinoma removed from lip   • PROSTATECTOMY, RADIAL  2003.    prostate cancer.   • APPENDECTOMY     • MITRAL VALVE REPLACE      S/P lisa Clip X1   • OTHER CARDIAC SURGERY  2000 and 2013    aVR. Thoracic aortic aneurysm repair.     SOCHX:  reports that he has never smoked. He has never used smokeless tobacco. He reports current alcohol use of about 0.6 oz of alcohol per week. He reports that he does not use drugs.  FH: Family history was reviewed, no pertinent findings to report   Objective:   BP (!) 142/82 (BP Location: Right arm, Patient Position: Sitting, BP Cuff Size: Adult)   Pulse 73   Temp 36.7 °C (98.1 °F) (Temporal)   Resp 14   Ht 1.88 m (6' 2\")   Wt 97.5 kg (215 lb)   SpO2 94%   BMI 27.60 kg/m²   Physical Exam  Vitals reviewed.   Constitutional:       General: He is not in acute distress.     Appearance: Normal appearance. He is well-developed. He is not toxic-appearing.   HENT:      Head: Normocephalic and atraumatic. "      Right Ear: External ear normal.      Left Ear: External ear normal.      Nose: Nose normal.   Cardiovascular:      Rate and Rhythm: Normal rate.   Pulmonary:      Effort: Pulmonary effort is normal. No respiratory distress.      Breath sounds: No stridor.   Musculoskeletal:      Comments: Numerous linear and irregular full-thickness lacerations on distal left little finger total approximately 5 cm.  Oozing blood.  Wound extensively explored-no foreign bodies.  Sensation intact.  Digit range of motion within normal limits.   Skin:     General: Skin is dry.   Neurological:      Comments: Alert and oriented.    Psychiatric:         Speech: Speech normal.         Behavior: Behavior normal.           Assessment/Plan:   1. Laceration of left little finger without foreign body with damage to nail, initial encounter  - TD Preservative Free =>6yo IM  - cephALEXin (KEFLEX) 500 MG Cap; Take 1 Capsule by mouth 4 times a day for 5 days.  Dispense: 20 Capsule; Refill: 0  - Laceration Repair    TD updated today.  Complex repair with good reapproximation.  Patient advised that skin flap on lateral aspect will likely be nonviable, but was kept in place to help reapproximate other lacerations and control bleeding.  Wound care instructions reviewed at length with patient and patient verbalized good understanding of signs and symptoms of infection.  He was given strict return precautions.  Due to extent and location of lacerations I do recommend that we start patient on a 5-day course of prophylactic antibiotics.  Follow-up with PCP or return to clinic in 10 days for suture removal.  All questions and concerns addressed.    Differential diagnosis, natural history, supportive care, and indications for immediate follow-up discussed.

## 2022-06-09 ENCOUNTER — OFFICE VISIT (OUTPATIENT)
Dept: MEDICAL GROUP | Facility: PHYSICIAN GROUP | Age: 82
End: 2022-06-09
Payer: MEDICARE

## 2022-06-09 VITALS
SYSTOLIC BLOOD PRESSURE: 122 MMHG | TEMPERATURE: 97.8 F | OXYGEN SATURATION: 99 % | HEIGHT: 74 IN | RESPIRATION RATE: 16 BRPM | HEART RATE: 74 BPM | DIASTOLIC BLOOD PRESSURE: 80 MMHG | WEIGHT: 195 LBS | BODY MASS INDEX: 25.03 KG/M2

## 2022-06-09 DIAGNOSIS — I10 ESSENTIAL HYPERTENSION: Chronic | ICD-10-CM

## 2022-06-09 DIAGNOSIS — S61.209D OPEN WOUND OF FINGER, SUBSEQUENT ENCOUNTER: ICD-10-CM

## 2022-06-09 DIAGNOSIS — Z48.02 ENCOUNTER FOR REMOVAL OF SUTURES: ICD-10-CM

## 2022-06-09 PROBLEM — S61.209A OPEN WOUND OF FINGER: Status: ACTIVE | Noted: 2022-06-09

## 2022-06-09 PROCEDURE — 99215 OFFICE O/P EST HI 40 MIN: CPT | Performed by: INTERNAL MEDICINE

## 2022-06-09 RX ORDER — CEPHALEXIN 500 MG/1
500 CAPSULE ORAL 4 TIMES DAILY
Qty: 28 CAPSULE | Refills: 0 | Status: SHIPPED | OUTPATIENT
Start: 2022-06-09 | End: 2022-06-15 | Stop reason: SDUPTHER

## 2022-06-09 ASSESSMENT — PATIENT HEALTH QUESTIONNAIRE - PHQ9: CLINICAL INTERPRETATION OF PHQ2 SCORE: 0

## 2022-06-09 NOTE — PROGRESS NOTES
"PRIMARY CARE CLINIC VISIT  Chief Complaint   Patient presents with   • Follow-Up     Suture removal    History of Present Illness     Open wound L 5th finger   Patient was seen in urgent care recently and had sutures placed.  Please refer to the urgent care note for detail.  The patient is up-to-date regarding tetanus shot.  Patient has completed the antibiotic.  The patient denies fever chills bleeding or discharge.    Essential hypertension  Chronic condition.  The patient is taking losartan and metoprolol.  Blood pressure has been well controlled.  No significant side effects reported.      Current Outpatient Medications on File Prior to Visit   Medication Sig Dispense Refill   • metoprolol SR (TOPROL XL) 25 MG TABLET SR 24 HR TAKE 1 TABLET BY MOUTH  DAILY 90 Tablet 3   • losartan (COZAAR) 50 MG Tab TAKE 1 TABLET BY MOUTH  DAILY 90 Tablet 3   • omeprazole (PRILOSEC) 20 MG delayed-release capsule Take 1 Capsule by mouth every day. 90 Capsule 3   • fluticasone (FLONASE) 50 MCG/ACT nasal spray Spray 2 Sprays in nose 1 time daily as needed.     • Cholecalciferol (VITAMIN D) 400 UNIT TABS Take 400 Units by mouth every day.     • DiphenhydrAMINE HCl (BENADRYL ALLERGY PO) Take 1 Each by mouth at bedtime as needed. As needed for sleep      • aspirin 81 MG tablet Take 81 mg by mouth every evening.       No current facility-administered medications on file prior to visit.        Allergies: Patient has no known allergies.    ROS  As per HPI above. All other systems reviewed and negative.      Past Medical, Social, and Family history reviewed and updated in EPIC     Objective     /80 (BP Location: Left arm, Patient Position: Sitting, BP Cuff Size: Adult)   Pulse 74   Temp 36.6 °C (97.8 °F) (Temporal)   Resp 16   Ht 1.88 m (6' 2\")   Wt 88.5 kg (195 lb)   SpO2 99%    Body mass index is 25.04 kg/m².    General: alert and oriented  Cardiovascular: regular rate and rhythm  Pulmonary: lungs : no wheezing "   Gastrointestinal: BS present. No obvious mass noted  Dressing was removed.  the wound was cleansed using Betadine swab x3/  Of note there was significant amount of Dry blood noted at the wound.  Very difficult to see the positions of all of the sutures   there is no discharge.  No active bleeding.  No fluctuance noted.  Advised pt to soak the wound in sterile saline using a  sterile cup for 10 minutes then reevaluate          Assessment and Plan     1. Encounter for removal of sutures  Wound again was cleansed in sterile manner.  A total of 17 sutures were removed.  No significant bleeding noted.  No fluctuance or discharge.  Patient tolerated procedures well.   I have informed the patient that I believe I have removed all of the suture, however there could be 1 or 2 sutures left buried below the old blood clot that we may not see.    Antibiotic ointment followed by sterile dressing.  Will start the patient on antibiotic Keflex for 7 days  Follow-up next week for wound recheck.  Advised the patient regarding dressing changes twice daily as directed.  Advised the patient to go to ER if fever chills redness discharge or any change in his status.    2. Essential hypertension    BP stable.  Continue with current management.    Total time:  42    min -  That includes chart review before the visit, the actual patient visit, wound care and time spent on documentation after the visit.  Chart review/prep, review of other providers' records, imaging/lab review, face-to-face time for history/examination, ordering, prescribing,  review of results/meds/ treatment plan with patient, documentation in EMR, care coordination (as needed)         Please note that this dictation was created using voice recognition software. I have made every reasonable attempt to correct obvious errors but there may be errors of grammar and content that I may have overlooked prior to finalization of this note.      Rao Quezada MD  Internal  [facility] primary care clinic

## 2022-06-09 NOTE — ASSESSMENT & PLAN NOTE
Patient was seen in urgent care recently and had sutures placed.  Please refer to the urgent care note for detail.  The patient is up-to-date regarding tetanus shot.  Patient has completed the antibiotic.  The patient denies fever chills bleeding or discharge.

## 2022-06-09 NOTE — ASSESSMENT & PLAN NOTE
Chronic condition.  The patient is taking losartan and metoprolol.  Blood pressure has been well controlled.  No significant side effects reported.

## 2022-06-15 ENCOUNTER — OFFICE VISIT (OUTPATIENT)
Dept: MEDICAL GROUP | Facility: PHYSICIAN GROUP | Age: 82
End: 2022-06-15
Payer: MEDICARE

## 2022-06-15 VITALS
TEMPERATURE: 97.2 F | WEIGHT: 196 LBS | BODY MASS INDEX: 25.15 KG/M2 | HEIGHT: 74 IN | DIASTOLIC BLOOD PRESSURE: 80 MMHG | HEART RATE: 68 BPM | SYSTOLIC BLOOD PRESSURE: 132 MMHG | RESPIRATION RATE: 97 BRPM

## 2022-06-15 DIAGNOSIS — Z48.02 ENCOUNTER FOR REMOVAL OF SUTURES: ICD-10-CM

## 2022-06-15 DIAGNOSIS — I10 ESSENTIAL HYPERTENSION: Chronic | ICD-10-CM

## 2022-06-15 PROCEDURE — 99214 OFFICE O/P EST MOD 30 MIN: CPT | Performed by: INTERNAL MEDICINE

## 2022-06-15 RX ORDER — CEPHALEXIN 500 MG/1
500 CAPSULE ORAL 4 TIMES DAILY
Qty: 28 CAPSULE | Refills: 0 | Status: SHIPPED | OUTPATIENT
Start: 2022-06-15 | End: 2022-06-22

## 2022-06-15 NOTE — PROGRESS NOTES
"PRIMARY CARE CLINIC VISIT  Chief Complaint   Patient presents with   • Follow-Up         History of Present Illness     Encounter for removal of sutures  Patient present today for a follow-up evaluation.  From the last visit the patient denies fever or chills.  He has been taking antibiotic without any side effects.  Patient denies discharge.        Essential hypertension  Chronic.  Stable.  The patient takes losartan and metoprolol.  Blood pressure has been well controlled.      Current Outpatient Medications on File Prior to Visit   Medication Sig Dispense Refill   • metoprolol SR (TOPROL XL) 25 MG TABLET SR 24 HR TAKE 1 TABLET BY MOUTH  DAILY 90 Tablet 3   • losartan (COZAAR) 50 MG Tab TAKE 1 TABLET BY MOUTH  DAILY 90 Tablet 3   • omeprazole (PRILOSEC) 20 MG delayed-release capsule Take 1 Capsule by mouth every day. 90 Capsule 3   • fluticasone (FLONASE) 50 MCG/ACT nasal spray Spray 2 Sprays in nose 1 time daily as needed.     • Cholecalciferol (VITAMIN D) 400 UNIT TABS Take 400 Units by mouth every day.     • DiphenhydrAMINE HCl (BENADRYL ALLERGY PO) Take 1 Each by mouth at bedtime as needed. As needed for sleep      • aspirin 81 MG tablet Take 81 mg by mouth every evening.       No current facility-administered medications on file prior to visit.        Allergies: Patient has no known allergies.    ROS  As per HPI above. All other systems reviewed and negative.      Past Medical, Social, and Family history reviewed and updated in EPIC     Objective     /80 (BP Location: Right arm, Patient Position: Sitting, BP Cuff Size: Adult)   Pulse 68   Temp 36.2 °C (97.2 °F) (Temporal)   Resp (!) 97   Ht 1.88 m (6' 2\")   Wt 88.9 kg (196 lb)    Body mass index is 25.16 kg/m².    General: alert and oriented  Cardiovascular: regular rate and rhythm  Pulmonary: lungs : no wheezing   Gastrointestinal: BS present. No obvious mass noted    Left fifth finger.  There is still minimal redness at the tip of the finger.  " There is no  discharge or fluctuance.  The swelling has improved.  There appeared 2 small suture pieces noted in the wound  These were removed in the office today.  Scab formation noted.        Assessment and Plan     1. Encounter for removal of sutures  Sutures were removed  Overall the patient's wound has improved compared to last visit.  There is still minimal redness.  There is no bleeding  Advised the patient to continue with oral antibiotic Keflex for 7 more days.  Advised the patient to return for follow-up next week for 1 final checkup  Patient reported that he is up-to-date regarding tetanus shot      2. Essential hypertension  Stable continue current management.      Other orders  - cephALEXin (KEFLEX) 500 MG Cap; Take 1 Capsule by mouth 4 times a day.  Dispense: 28 Capsule; Refill: 0                   Please note that this dictation was created using voice recognition software. I have made every reasonable attempt to correct obvious errors but there may be errors of grammar and content that I may have overlooked prior to finalization of this note.      Rao Quezada MD  Internal Medicine  [facility]

## 2022-06-15 NOTE — ASSESSMENT & PLAN NOTE
Patient present today for a follow-up evaluation.  From the last visit the patient denies fever or chills.  He has been taking antibiotic without any side effects.  Patient denies discharge.

## 2022-06-15 NOTE — ASSESSMENT & PLAN NOTE
Chronic.  Stable.  The patient takes losartan and metoprolol.  Blood pressure has been well controlled.

## 2022-06-22 ENCOUNTER — HOSPITAL ENCOUNTER (OUTPATIENT)
Dept: LAB | Facility: MEDICAL CENTER | Age: 82
End: 2022-06-22
Attending: INTERNAL MEDICINE
Payer: MEDICARE

## 2022-06-22 ENCOUNTER — OFFICE VISIT (OUTPATIENT)
Dept: MEDICAL GROUP | Facility: PHYSICIAN GROUP | Age: 82
End: 2022-06-22
Payer: MEDICARE

## 2022-06-22 VITALS
DIASTOLIC BLOOD PRESSURE: 78 MMHG | HEART RATE: 65 BPM | SYSTOLIC BLOOD PRESSURE: 136 MMHG | OXYGEN SATURATION: 97 % | TEMPERATURE: 97.3 F | RESPIRATION RATE: 16 BRPM | HEIGHT: 74 IN | BODY MASS INDEX: 25.41 KG/M2 | WEIGHT: 198 LBS

## 2022-06-22 DIAGNOSIS — R20.0 FINGER NUMBNESS: ICD-10-CM

## 2022-06-22 DIAGNOSIS — I10 ESSENTIAL HYPERTENSION: ICD-10-CM

## 2022-06-22 DIAGNOSIS — D69.6 THROMBOCYTOPENIA (HCC): ICD-10-CM

## 2022-06-22 DIAGNOSIS — Z95.2 S/P AVR (AORTIC VALVE REPLACEMENT): Chronic | ICD-10-CM

## 2022-06-22 DIAGNOSIS — N18.31 STAGE 3A CHRONIC KIDNEY DISEASE: ICD-10-CM

## 2022-06-22 DIAGNOSIS — S61.209D OPEN WOUND OF FINGER, SUBSEQUENT ENCOUNTER: ICD-10-CM

## 2022-06-22 DIAGNOSIS — R73.03 PREDIABETES: ICD-10-CM

## 2022-06-22 DIAGNOSIS — Z95.0 BIVENTRICULAR CARDIAC PACEMAKER IN SITU: Chronic | ICD-10-CM

## 2022-06-22 LAB
ANION GAP SERPL CALC-SCNC: 12 MMOL/L (ref 7–16)
BASOPHILS # BLD AUTO: 1.1 % (ref 0–1.8)
BASOPHILS # BLD: 0.05 K/UL (ref 0–0.12)
BUN SERPL-MCNC: 20 MG/DL (ref 8–22)
CALCIUM SERPL-MCNC: 9.4 MG/DL (ref 8.5–10.5)
CHLORIDE SERPL-SCNC: 105 MMOL/L (ref 96–112)
CHOLEST SERPL-MCNC: 156 MG/DL (ref 100–199)
CO2 SERPL-SCNC: 23 MMOL/L (ref 20–33)
CREAT SERPL-MCNC: 1.23 MG/DL (ref 0.5–1.4)
EOSINOPHIL # BLD AUTO: 0.18 K/UL (ref 0–0.51)
EOSINOPHIL NFR BLD: 4.1 % (ref 0–6.9)
ERYTHROCYTE [DISTWIDTH] IN BLOOD BY AUTOMATED COUNT: 48.2 FL (ref 35.9–50)
EST. AVERAGE GLUCOSE BLD GHB EST-MCNC: 120 MG/DL
FASTING STATUS PATIENT QL REPORTED: NORMAL
GFR SERPLBLD CREATININE-BSD FMLA CKD-EPI: 58 ML/MIN/1.73 M 2
GLUCOSE SERPL-MCNC: 101 MG/DL (ref 65–99)
HBA1C MFR BLD: 5.8 % (ref 4–5.6)
HCT VFR BLD AUTO: 43.7 % (ref 42–52)
HDLC SERPL-MCNC: 61 MG/DL
HGB BLD-MCNC: 14.1 G/DL (ref 14–18)
IMM GRANULOCYTES # BLD AUTO: 0.02 K/UL (ref 0–0.11)
IMM GRANULOCYTES NFR BLD AUTO: 0.5 % (ref 0–0.9)
LDLC SERPL CALC-MCNC: 81 MG/DL
LYMPHOCYTES # BLD AUTO: 1.12 K/UL (ref 1–4.8)
LYMPHOCYTES NFR BLD: 25.5 % (ref 22–41)
MCH RBC QN AUTO: 29.6 PG (ref 27–33)
MCHC RBC AUTO-ENTMCNC: 32.3 G/DL (ref 33.7–35.3)
MCV RBC AUTO: 91.6 FL (ref 81.4–97.8)
MONOCYTES # BLD AUTO: 0.5 K/UL (ref 0–0.85)
MONOCYTES NFR BLD AUTO: 11.4 % (ref 0–13.4)
NEUTROPHILS # BLD AUTO: 2.52 K/UL (ref 1.82–7.42)
NEUTROPHILS NFR BLD: 57.4 % (ref 44–72)
NRBC # BLD AUTO: 0 K/UL
NRBC BLD-RTO: 0 /100 WBC
PLATELET # BLD AUTO: 115 K/UL (ref 164–446)
PMV BLD AUTO: 12.1 FL (ref 9–12.9)
POTASSIUM SERPL-SCNC: 3.9 MMOL/L (ref 3.6–5.5)
RBC # BLD AUTO: 4.77 M/UL (ref 4.7–6.1)
SODIUM SERPL-SCNC: 140 MMOL/L (ref 135–145)
TRIGL SERPL-MCNC: 70 MG/DL (ref 0–149)
TSH SERPL DL<=0.005 MIU/L-ACNC: 2.32 UIU/ML (ref 0.38–5.33)
WBC # BLD AUTO: 4.4 K/UL (ref 4.8–10.8)

## 2022-06-22 PROCEDURE — 99214 OFFICE O/P EST MOD 30 MIN: CPT | Performed by: INTERNAL MEDICINE

## 2022-06-22 PROCEDURE — 36415 COLL VENOUS BLD VENIPUNCTURE: CPT

## 2022-06-22 PROCEDURE — 83036 HEMOGLOBIN GLYCOSYLATED A1C: CPT | Mod: GA

## 2022-06-22 PROCEDURE — 80061 LIPID PANEL: CPT

## 2022-06-22 PROCEDURE — 85025 COMPLETE CBC W/AUTO DIFF WBC: CPT

## 2022-06-22 PROCEDURE — 84443 ASSAY THYROID STIM HORMONE: CPT

## 2022-06-22 PROCEDURE — 80048 BASIC METABOLIC PNL TOTAL CA: CPT

## 2022-06-22 NOTE — PROGRESS NOTES
PRIMARY CARE CLINIC VISIT  Chief Complaint   Patient presents with   • Follow-Up     Will check    History of Present Illness     Open wound of finger  Patient presents today for a follow-up evaluation.  He has completed the antibiotic treatment.  No significant side effects reported.  The patient denies fever or chills.  Patient denies redness discharge.  The swelling has resolved per patient report.  Patient denies any pain.  The patient however reported persistent numbness sensation at the tip of the finger    Thrombocytopenia (HCC)  Chronic condition.  Previous lab test show platelet in the low 100s.  Patient denies any history of bleeding.  He is currently taking aspirin.  Lab tests ordered for follow-up    Prediabetes  Patient on diet therapy.  Lab tests ordered for follow-up.    Essential hypertension  Patient currently taking losartan and metoprolol.  No significant side effects reported.    S/P AVR (aortic valve replacement)  Patient is status post aortic valve replacement.  The patient followed by cardiology service.  Patient denies chest pain shortness of breath palpitation or near syncope.    Biventricular cardiac pacemaker in situ  Pacemaker is being monitored by cardiology service.  No new problems reported.      Current Outpatient Medications on File Prior to Visit   Medication Sig Dispense Refill   • metoprolol SR (TOPROL XL) 25 MG TABLET SR 24 HR TAKE 1 TABLET BY MOUTH  DAILY 90 Tablet 3   • losartan (COZAAR) 50 MG Tab TAKE 1 TABLET BY MOUTH  DAILY 90 Tablet 3   • omeprazole (PRILOSEC) 20 MG delayed-release capsule Take 1 Capsule by mouth every day. 90 Capsule 3   • fluticasone (FLONASE) 50 MCG/ACT nasal spray Spray 2 Sprays in nose 1 time daily as needed.     • Cholecalciferol (VITAMIN D) 400 UNIT TABS Take 400 Units by mouth every day.     • DiphenhydrAMINE HCl (BENADRYL ALLERGY PO) Take 1 Each by mouth at bedtime as needed. As needed for sleep      • aspirin 81 MG tablet Take 81 mg by mouth every  "evening.       No current facility-administered medications on file prior to visit.        Allergies: Patient has no known allergies.    ROS  As per HPI above. All other systems reviewed and negative.      Past Medical, Social, and Family history reviewed and updated in EPIC     Objective     /78 (BP Location: Left arm, Patient Position: Sitting, BP Cuff Size: Adult)   Pulse 65   Temp 36.3 °C (97.3 °F) (Temporal)   Resp 16   Ht 1.88 m (6' 2\")   Wt 89.8 kg (198 lb)   SpO2 97%    Body mass index is 25.42 kg/m².    General: alert and oriented  Cardiovascular: regular rate and rhythm  Pulmonary: lungs : no wheezing   Gastrointestinal: BS present. No obvious mass noted    Left fourth finger.  There is no redness, swelling, fluctuance or discharge.  Scab formation noted at the tip of the finger.  Range of motion of the finger patient has flexion and extension noted at the PIP and DIP joint.  Sensory testing the patient unable to feel the monofilament testing.  Capillary refills immediate    Assessment and Plan     1. Open wound of finger, subsequent encounter  2. Finger numbness  The wound is healing clinically..  There is no active signs of infection or inflammation or swelling.  Due to persistent numbness of the finger I have referred the patient to hand specialist for further evaluation.  - Referral to Hand Surgery    3. Stage 3a chronic kidney disease (HCC)  Lab tests ordered.  Advised the patient to avoid NSAIDs.  Continue to monitor.  4. Prediabetes  - HEMOGLOBIN A1C; Future  - Basic Metabolic Panel; Future    5. Essential hypertension  - Lipid Profile; Future  - TSH; Future  Stable continue with losartan and metoprolol.  6. Thrombocytopenia (HCC)  - CBC WITH DIFFERENTIAL; Future  Continue to monitor.      7. S/P AVR (aortic valve replacement)  8. Biventricular cardiac pacemaker in situ  Chronic condition.  Patient is medically stable.  Continue current management and follow-up with cardiology service as " directed.      Recommend follow-up approximately 4 months                     Please note that this dictation was created using voice recognition software. I have made every reasonable attempt to correct obvious errors but there may be errors of grammar and content that I may have overlooked prior to finalization of this note.      Rao Quezada MD  Internal Medicine  [facility]

## 2022-06-22 NOTE — ASSESSMENT & PLAN NOTE
Chronic condition.  Previous lab test show platelet in the low 100s.  Patient denies any history of bleeding.  He is currently taking aspirin.  Lab tests ordered for follow-up

## 2022-06-22 NOTE — ASSESSMENT & PLAN NOTE
Patient presents today for a follow-up evaluation.  He has completed the antibiotic treatment.  No significant side effects reported.  The patient denies fever or chills.  Patient denies redness discharge.  The swelling has resolved per patient report.  Patient denies any pain.  The patient however reported persistent numbness sensation at the tip of the finger

## 2022-06-22 NOTE — ASSESSMENT & PLAN NOTE
Patient is status post aortic valve replacement.  The patient followed by cardiology service.  Patient denies chest pain shortness of breath palpitation or near syncope.

## 2022-07-23 DIAGNOSIS — K21.9 GASTROESOPHAGEAL REFLUX DISEASE WITHOUT ESOPHAGITIS: ICD-10-CM

## 2022-07-25 RX ORDER — OMEPRAZOLE 20 MG/1
CAPSULE, DELAYED RELEASE ORAL
Qty: 90 CAPSULE | Refills: 3 | Status: SHIPPED | OUTPATIENT
Start: 2022-07-25 | End: 2023-06-27

## 2022-07-29 ENCOUNTER — TELEPHONE (OUTPATIENT)
Dept: CARDIOLOGY | Facility: MEDICAL CENTER | Age: 82
End: 2022-07-29
Payer: MEDICARE

## 2022-07-29 ENCOUNTER — NON-PROVIDER VISIT (OUTPATIENT)
Dept: CARDIOLOGY | Facility: MEDICAL CENTER | Age: 82
End: 2022-07-29
Payer: MEDICARE

## 2022-07-29 DIAGNOSIS — Z95.0 CARDIAC PACEMAKER IN SITU: ICD-10-CM

## 2022-07-29 DIAGNOSIS — I44.2 ATRIOVENTRICULAR BLOCK, COMPLETE (HCC): ICD-10-CM

## 2022-07-29 PROCEDURE — 93294 REM INTERROG EVL PM/LDLS PM: CPT | Performed by: INTERNAL MEDICINE

## 2022-07-29 NOTE — CARDIAC REMOTE MONITOR - SCAN
Device transmission reviewed. Device demonstrated appropriate function.       Electronically Signed by: Paul Duggan M.D.    8/3/2022  8:08 AM

## 2022-07-29 NOTE — TELEPHONE ENCOUNTER
Jos,    I'm going to schedule this gen change with Dr. Hernandes. Can you please enter the order for this BiV PM gen change?    Thank You,  Paige

## 2022-07-29 NOTE — TELEPHONE ENCOUNTER
Patients device is @ [facility]--please contact patient to scheduled for gen change.  Can be done in 2-4 weeks.  Patient has a BIV Pacemaker [employer].

## 2022-08-01 NOTE — TELEPHONE ENCOUNTER
Patient scheduled for BiV PM gen change on 8-16-22 with Dr. Hernandes. Patient has been instructed to check in at 9:00 for 11:00 case time. Message sent to christine Dangelo with [employer] notified.

## 2022-08-12 ENCOUNTER — PRE-ADMISSION TESTING (OUTPATIENT)
Dept: ADMISSIONS | Facility: MEDICAL CENTER | Age: 82
End: 2022-08-12
Attending: INTERNAL MEDICINE
Payer: MEDICARE

## 2022-08-12 DIAGNOSIS — Z01.812 PRE-OPERATIVE LABORATORY EXAMINATION: ICD-10-CM

## 2022-08-12 DIAGNOSIS — Z01.810 PRE-OPERATIVE CARDIOVASCULAR EXAMINATION: ICD-10-CM

## 2022-08-12 LAB
ALBUMIN SERPL BCP-MCNC: 4 G/DL (ref 3.2–4.9)
ALBUMIN/GLOB SERPL: 1.3 G/DL
ALP SERPL-CCNC: 88 U/L (ref 30–99)
ALT SERPL-CCNC: 10 U/L (ref 2–50)
ANION GAP SERPL CALC-SCNC: 10 MMOL/L (ref 7–16)
AST SERPL-CCNC: 19 U/L (ref 12–45)
BILIRUB SERPL-MCNC: 0.6 MG/DL (ref 0.1–1.5)
BUN SERPL-MCNC: 20 MG/DL (ref 8–22)
CALCIUM SERPL-MCNC: 9.3 MG/DL (ref 8.5–10.5)
CHLORIDE SERPL-SCNC: 109 MMOL/L (ref 96–112)
CO2 SERPL-SCNC: 21 MMOL/L (ref 20–33)
CREAT SERPL-MCNC: 1.28 MG/DL (ref 0.5–1.4)
EKG IMPRESSION: NORMAL
ERYTHROCYTE [DISTWIDTH] IN BLOOD BY AUTOMATED COUNT: 46.4 FL (ref 35.9–50)
GFR SERPLBLD CREATININE-BSD FMLA CKD-EPI: 55 ML/MIN/1.73 M 2
GLOBULIN SER CALC-MCNC: 3.2 G/DL (ref 1.9–3.5)
GLUCOSE SERPL-MCNC: 111 MG/DL (ref 65–99)
HCT VFR BLD AUTO: 39.9 % (ref 42–52)
HGB BLD-MCNC: 13.4 G/DL (ref 14–18)
INR PPP: 1.04 (ref 0.87–1.13)
MCH RBC QN AUTO: 30.2 PG (ref 27–33)
MCHC RBC AUTO-ENTMCNC: 33.6 G/DL (ref 33.7–35.3)
MCV RBC AUTO: 89.9 FL (ref 81.4–97.8)
PLATELET # BLD AUTO: 105 K/UL (ref 164–446)
PMV BLD AUTO: 12.7 FL (ref 9–12.9)
POTASSIUM SERPL-SCNC: 4.2 MMOL/L (ref 3.6–5.5)
PROT SERPL-MCNC: 7.2 G/DL (ref 6–8.2)
PROTHROMBIN TIME: 13.5 SEC (ref 12–14.6)
RBC # BLD AUTO: 4.44 M/UL (ref 4.7–6.1)
SODIUM SERPL-SCNC: 140 MMOL/L (ref 135–145)
WBC # BLD AUTO: 5 K/UL (ref 4.8–10.8)

## 2022-08-12 PROCEDURE — 85027 COMPLETE CBC AUTOMATED: CPT

## 2022-08-12 PROCEDURE — 36415 COLL VENOUS BLD VENIPUNCTURE: CPT

## 2022-08-12 PROCEDURE — 93010 ELECTROCARDIOGRAM REPORT: CPT | Performed by: INTERNAL MEDICINE

## 2022-08-12 PROCEDURE — 85610 PROTHROMBIN TIME: CPT

## 2022-08-12 PROCEDURE — 80053 COMPREHEN METABOLIC PANEL: CPT

## 2022-08-12 PROCEDURE — 93005 ELECTROCARDIOGRAM TRACING: CPT

## 2022-08-15 NOTE — TELEPHONE ENCOUNTER
Due to a cancellation - this patient's arrival time is now 7:00 for 9:00 case time. [place] with [employer] notified of time change

## 2022-08-16 ENCOUNTER — HOSPITAL ENCOUNTER (OUTPATIENT)
Facility: MEDICAL CENTER | Age: 82
End: 2022-08-16
Attending: INTERNAL MEDICINE | Admitting: INTERNAL MEDICINE
Payer: MEDICARE

## 2022-08-16 ENCOUNTER — APPOINTMENT (OUTPATIENT)
Dept: CARDIOLOGY | Facility: MEDICAL CENTER | Age: 82
End: 2022-08-16
Attending: INTERNAL MEDICINE
Payer: MEDICARE

## 2022-08-16 VITALS
SYSTOLIC BLOOD PRESSURE: 154 MMHG | TEMPERATURE: 97.5 F | DIASTOLIC BLOOD PRESSURE: 75 MMHG | RESPIRATION RATE: 16 BRPM | OXYGEN SATURATION: 94 % | HEART RATE: 64 BPM | HEIGHT: 74 IN | WEIGHT: 195.33 LBS | BODY MASS INDEX: 25.07 KG/M2

## 2022-08-16 DIAGNOSIS — I44.2 ATRIOVENTRICULAR BLOCK, COMPLETE (HCC): ICD-10-CM

## 2022-08-16 DIAGNOSIS — Z95.0 CARDIAC PACEMAKER IN SITU: ICD-10-CM

## 2022-08-16 PROCEDURE — 160002 HCHG RECOVERY MINUTES (STAT)

## 2022-08-16 PROCEDURE — 700111 HCHG RX REV CODE 636 W/ 250 OVERRIDE (IP)

## 2022-08-16 PROCEDURE — 99152 MOD SED SAME PHYS/QHP 5/>YRS: CPT | Performed by: INTERNAL MEDICINE

## 2022-08-16 PROCEDURE — 33229 REMV&REPLC PM GEN MULT LEADS: CPT | Performed by: INTERNAL MEDICINE

## 2022-08-16 PROCEDURE — 305387 CL-BIV IMPLANTABLE CARDIOVERTER DEFIBRILLATOR GENERATOR CHANGE

## 2022-08-16 PROCEDURE — 160046 HCHG PACU - 1ST 60 MINS PHASE II

## 2022-08-16 PROCEDURE — 700105 HCHG RX REV CODE 258: Performed by: INTERNAL MEDICINE

## 2022-08-16 PROCEDURE — 160035 HCHG PACU - 1ST 60 MINS PHASE I

## 2022-08-16 PROCEDURE — 700101 HCHG RX REV CODE 250

## 2022-08-16 RX ORDER — BUPIVACAINE HYDROCHLORIDE 2.5 MG/ML
INJECTION, SOLUTION EPIDURAL; INFILTRATION; INTRACAUDAL
Status: COMPLETED
Start: 2022-08-16 | End: 2022-08-16

## 2022-08-16 RX ORDER — CEPHALEXIN 500 MG/1
500 CAPSULE ORAL 3 TIMES DAILY
Qty: 15 CAPSULE | Refills: 0 | Status: SHIPPED | OUTPATIENT
Start: 2022-08-16 | End: 2022-11-08

## 2022-08-16 RX ORDER — CEFAZOLIN SODIUM 1 G/3ML
INJECTION, POWDER, FOR SOLUTION INTRAMUSCULAR; INTRAVENOUS
Status: COMPLETED
Start: 2022-08-16 | End: 2022-08-16

## 2022-08-16 RX ORDER — SODIUM CHLORIDE, SODIUM LACTATE, POTASSIUM CHLORIDE, CALCIUM CHLORIDE 600; 310; 30; 20 MG/100ML; MG/100ML; MG/100ML; MG/100ML
INJECTION, SOLUTION INTRAVENOUS CONTINUOUS
Status: DISCONTINUED | OUTPATIENT
Start: 2022-08-16 | End: 2022-08-16 | Stop reason: HOSPADM

## 2022-08-16 RX ORDER — LIDOCAINE HYDROCHLORIDE 20 MG/ML
INJECTION, SOLUTION EPIDURAL; INFILTRATION; INTRACAUDAL; PERINEURAL
Status: COMPLETED
Start: 2022-08-16 | End: 2022-08-16

## 2022-08-16 RX ORDER — MIDAZOLAM HYDROCHLORIDE 1 MG/ML
INJECTION INTRAMUSCULAR; INTRAVENOUS
Status: COMPLETED
Start: 2022-08-16 | End: 2022-08-16

## 2022-08-16 RX ADMIN — SODIUM CHLORIDE, POTASSIUM CHLORIDE, SODIUM LACTATE AND CALCIUM CHLORIDE: 600; 310; 30; 20 INJECTION, SOLUTION INTRAVENOUS at 08:02

## 2022-08-16 RX ADMIN — CEFAZOLIN 3000 MG: 330 INJECTION, POWDER, FOR SOLUTION INTRAMUSCULAR; INTRAVENOUS at 09:56

## 2022-08-16 RX ADMIN — BUPIVACAINE HYDROCHLORIDE: 2.5 INJECTION, SOLUTION EPIDURAL; INFILTRATION; INTRACAUDAL; PERINEURAL at 09:56

## 2022-08-16 RX ADMIN — MIDAZOLAM 2 MG: 1 INJECTION INTRAMUSCULAR; INTRAVENOUS at 10:12

## 2022-08-16 RX ADMIN — LIDOCAINE HYDROCHLORIDE 10 ML: 20 INJECTION, SOLUTION EPIDURAL; INFILTRATION; INTRACAUDAL; PERINEURAL at 09:56

## 2022-08-16 RX ADMIN — FENTANYL CITRATE 100 MCG: 50 INJECTION, SOLUTION INTRAMUSCULAR; INTRAVENOUS at 10:12

## 2022-08-16 ASSESSMENT — FIBROSIS 4 INDEX: FIB4 SCORE: 4.75

## 2022-08-16 NOTE — DISCHARGE INSTRUCTIONS
HOME CARE INSTRUCTIONS    ACTIVITY: Rest and take it easy for the first 24 hours.  A responsible adult is recommended to remain with you during that time.  It is normal to feel sleepy.  We encourage you to not do anything that requires balance, judgment or coordination.    FOR 24 HOURS DO NOT:  Drive, operate machinery or run household appliances.  Drink beer or alcoholic beverages.  Make important decisions or sign legal documents.    SPECIAL INSTRUCTIONS: Pacemaker Battery Change, Care After  This sheet gives you information about how to care for yourself after your procedure. Your health care provider may also give you more specific instructions. If you have problems or questions, contact your health care provider.  What can I expect after the procedure?  After your procedure, it is common to have:  Pain or soreness at the site where the pacemaker was inserted.  Swelling at the site where the pacemaker was inserted.  Follow these instructions at home:  Incision care  Keep the incision clean and dry.  Do not take baths, swim, or use a hot tub until your health care provider approves.  You may shower the day after your procedure, or as directed by your health care provider.  Pat the area dry with a clean towel. Do not rub the area. This may cause bleeding.  Follow instructions from your health care provider about how to take care of your incision. Make sure you:  Wash your hands with soap and water before you change your bandage (dressing). If soap and water are not available, use hand .  Change your dressing as told by your health care provider.  Leave stitches (sutures), skin glue, or adhesive strips in place. These skin closures may need to stay in place for 2 weeks or longer. If adhesive strip edges start to loosen and curl up, you may trim the loose edges. Do not remove adhesive strips completely unless your health care provider tells you to do that.  Check your incision area every day for signs of  infection. Check for:  More redness, swelling, or pain.  More fluid or blood.  Warmth.  Pus or a bad smell.  Activity  Do not lift anything that is heavier than 10 lb (4.5 kg) until your health care provider says it is okay to do so.  For the first 2 weeks, or as long as told by your health care provider:  Avoid lifting your left arm higher than your shoulder.  Be gentle when you move your arms over your head. It is okay to raise your arm to comb your hair.  Avoid strenuous exercise.  Ask your health care provider when it is okay to:  Resume your normal activities.  Return to work or school.  Resume sexual activity.  Eating and drinking  Eat a heart-healthy diet. This should include plenty of fresh fruits and vegetables, whole grains, low-fat dairy products, and lean protein like chicken and fish.  Limit alcohol intake to no more than 1 drink a day for non-pregnant women and 2 drinks a day for men. One drink equals 12 oz of beer, 5 oz of wine, or 1½ oz of hard liquor.  Check ingredients and nutrition facts on packaged foods and beverages. Avoid the following types of food:  Food that is high in salt (sodium).  Food that is high in saturated fat, like full-fat dairy or red meat.  Food that is high in trans fat, like fried food.  Food and drinks that are high in sugar.  Lifestyle  Do not use any products that contain nicotine or tobacco, such as cigarettes and e-cigarettes. If you need help quitting, ask your health care provider.  Take steps to manage and control your weight.  Get regular exercise. Aim for 150 minutes of moderate-intensity exercise (such as walking or yoga) or 75 minutes of vigorous exercise (such as running or swimming) each week.  Manage other health problems, such as diabetes or high blood pressure. Ask your health care provider how you can manage these conditions.  General instructions  Do not drive for 24 hours after your procedure if you were given a medicine to help you relax (sedative).  Take  over-the-counter and prescription medicines only as told by your health care provider.  Avoid putting pressure on the area where the pacemaker was placed.  If you need an MRI after your pacemaker has been placed, be sure to tell the health care provider who orders the MRI that you have a pacemaker.  Avoid close and prolonged exposure to electrical devices that have strong magnetic fields. These include:  Cell phones. Avoid keeping them in a pocket near the pacemaker, and try using the ear opposite the pacemaker.  MP3 players.  Household appliances, like microwaves.  Metal detectors.  Electric generators.  High-tension wires.  Keep all follow-up visits as directed by your health care provider. This is important.  Contact a health care provider if:  You have pain at the incision site that is not relieved by over-the-counter or prescription medicines.  You have any of these around your incision site or coming from it:  More redness, swelling, or pain.  Fluid or blood.  Warmth to the touch.  Pus or a bad smell.  You have a fever.  You feel brief, occasional palpitations, light-headedness, or any symptoms that you think might be related to your heart.  Get help right away if:  You experience chest pain that is different from the pain at the pacemaker site.  You develop a red streak that extends above or below the incision site.  You experience shortness of breath.  You have palpitations or an irregular heartbeat.  You have light-headedness that does not go away quickly.  You faint or have dizzy spells.  Your pulse suddenly drops or increases rapidly and does not return to normal.  You begin to gain weight and your legs and ankles swell.  Summary  After your procedure, it is common to have pain, soreness, and some swelling where the pacemaker was inserted.  Make sure to keep your incision clean and dry. Follow instructions from your health care provider about how to take care of your incision.  Check your incision every  day for signs of infection, such as more pain or swelling, pus or a bad smell, warmth, or leaking fluid and blood.  Avoid strenuous exercise and lifting your left arm higher than your shoulder for 2 weeks, or as long as told by your health care provider.  This information is not intended to replace advice given to you by your health care provider. Make sure you discuss any questions you have with your health care provider.  Document Released: 10/08/2014 Document Revised: 11/30/2018 Document Reviewed: 11/09/2017  [employer] Patient Education © 2020 [employer]      DIET: To avoid nausea, slowly advance diet as tolerated, avoiding spicy or greasy foods for the first day.  Add more substantial food to your diet according to your physician's instructions.  Babies can be fed formula or breast milk as soon as they are hungry.  INCREASE FLUIDS AND FIBER TO AVOID CONSTIPATION.    SURGICAL DRESSING/BATHING: keep dressing clean and dry for 7 days; sponge baths only    MEDICATIONS: Resume taking daily medication.  Take prescribed pain medication with food.  If no medication is prescribed, you may take non-aspirin pain medication if needed.  PAIN MEDICATION CAN BE VERY CONSTIPATING.  Take a stool softener or laxative such as senokot, pericolace, or milk of magnesia if needed.    Prescription given for _Keflex (antibiotic)_.      A follow-up appointment should be arranged with your doctor in 1 week; call to schedule.    You should CALL YOUR PHYSICIAN if you develop:  Fever greater than 101 degrees F.  Pain not relieved by medication, or persistent nausea or vomiting.  Excessive bleeding (blood soaking through dressing) or unexpected drainage from the wound.  Extreme redness or swelling around the incision site, drainage of pus or foul smelling drainage.  Inability to urinate or empty your bladder within 8 hours.  Problems with breathing or chest pain.    You should call 911 if you develop problems with breathing or chest  pain.  If you are unable to contact your doctor or surgical center, you should go to the nearest emergency room or urgent care center.  Physician's telephone #: [phone]    MILD FLU-LIKE SYMPTOMS ARE NORMAL.  YOU MAY EXPERIENCE GENERALIZED MUSCLE ACHES, THROAT IRRITATION, HEADACHE AND/OR SOME NAUSEA.    If any questions arise, call your doctor.  If your doctor is not available, please feel free to call the Surgical Center at [phone].  The Center is open Monday through Friday from 7AM to 7PM.      A registered nurse may call you a few days after your surgery to see how you are doing after your procedure.    You may also receive a survey in the mail within the next two weeks and we ask that you take a few moments to complete the survey and return it to us.  Our goal is to provide you with very good care and we value your comments.     Depression / Suicide Risk    As you are discharged from this [facility] facility, it is important to learn how to keep safe from harming yourself.    Recognize the warning signs:  Abrupt changes in personality, positive or negative- including increase in energy   Giving away possessions  Change in eating patterns- significant weight changes-  positive or negative  Change in sleeping patterns- unable to sleep or sleeping all the time   Unwillingness or inability to communicate  Depression  Unusual sadness, discouragement and loneliness  Talk of wanting to die  Neglect of personal appearance   Rebelliousness- reckless behavior  Withdrawal from people/activities they love  Confusion- inability to concentrate     If you or a loved one observes any of these behaviors or has concerns about self-harm, here's what you can do:  Talk about it- your feelings and reasons for harming yourself  Remove any means that you might use to hurt yourself (examples: pills, rope, extension cords, firearm)  Get professional help from the community (Mental Health, Substance Abuse, psychological  counseling)  Do not be alone:Call your Safe Contact- someone whom you trust who will be there for you.  Call your local CRISIS HOTLINE 681-9281 or 906-566-2074  Call your local Children's Mobile Crisis Response Team [place] (652) 802-0357 or www.TMS  Call the toll free National Suicide Prevention Hotlines   National Suicide Prevention Lifeline 863-487-OBJD (1610)  [facility] [place] Line Network 800-SUICIDE (698-0058)    I acknowledge receipt and understanding of these Home Care instructions.

## 2022-08-16 NOTE — OP REPORT
Electrophysiology Procedure Note  [facility]    PROCEDURE:  CRT-P pacemaker generator change,   moderate sedation administered by RN and supervised by physician    : Zan Hernandes M.D.    ANESTHESIA: Moderate sedation,  start time 0824, stop time 0857  the moderate sedation document has been reviewed, signed and scanned into media     MEDICATIONS:  2 mg Versed, 100 mcg Fentanyl  2 g Ancef    ESTIMATED BLOOD LOSS: 20 cc.    SPECIMENS: None.    COMPLICATIONS: None    INDICATION: PPM at RIO    PRE-PROCEDURE ECG: BiV paced    POST-PROCEDURE ECG: Same as prior    DESCRIPTION OF PROCEDURE: After informed written consent, the patient was brought to the electrophysiology lab in the fasting, unsedated state. The patient was prepped and draped in the usual sterile fashion. The procedure was performed under moderate sedation with local anesthetic. An incision was made with a scalpel along the old scar. Access to the device pocket was made using a combination of blunt dissection and electrocautery. The old generator and leads were freed from adhesions and the generator disconnected from the leads. Leads tested fine.  The pocket was irrigated with antibiotic solution, and the new generator was connected to the leads and inserted back in the pocket. The wound was closed with three layers of absorbable sutures and covered with Steri-Strips.   I personally supervised the administration of moderate sedation by the RN and observed the level of consciousness and physiologic status throughout the procedure.  Following recovery from sedation, the patient was transferred to a monitored bed in good condition.    IMPLANTED DEVICE INFORMATION:    Pulse generator is a [employer] model UD4635  Serial number [id number]      LEAD INFORMATION:  1. Right atrial lead is a [employer]bbott model 1688TC, serial number [id number], P wave 4.3 millivolts, threshold 0.75 Volts, pacing impedance 400 Ohms, implant date  12/29/2014  2. Right ventricular lead is a [employer]bbott model 1688TC, serial number [id number], R wave N/A millivolts, threshold 1.75 Volts, pacing impedance 380 Ohms, implant date 12/29/2014  3. Coronary sinus lead is a [employer]/Willoughby  Model 1458Q, serial number [id number], R wave N/A millivolts, threshold 0.5Volts, pacing impedance 1075Ohms, implant date 12/29/2014    DEVICE PROGRAMMING:    As previous programmed     IMPRESSIONS:  1. Successful CRT-P generator change.    RECOMMENDATIONS:  1. Transfer to PPU.  2. Five days PO antibiotics.  3. Follow-up in device clinic for wound check and device interrogation.

## 2022-08-16 NOTE — OR NURSING
Pt verbalizes readiness for discharge. Dressing CDI. Discharge instructions reviewed with pt and pt's wife. Pt wanted to walk to car. Gait steady, CNA at side. No further needs.

## 2022-08-16 NOTE — H&P
[facility]  Electrophysiology Pre-procedure H&P    DOS:8/16/2022    Planned Procedure: CRT-P changeout    Chief complaint/Reason for Procedure: PPM at [facility]    HPI: 83 yo M with CRTP at RIO for changeout      Past Medical History:   Diagnosis Date    Arthritis     hands    Basal cell carcinoma of lip 11/27/2013    Biventricular cardiac pacemaker in situ 3/4/2015    Breath shortness     with exertion    Cancer (HCC)     prostate    GERD (gastroesophageal reflux disease) 10/17/2013    Heart burn     Hypertension      Indigestion     Insomnia 10/17/2013    LBBB (left bundle branch block)      MEDICAL HOME     Pacemaker     Prostate cancer (HCC) 2003    PVCs (premature ventricular contractions) 3/4/2015    S/P AVR (aortic valve replacement) 2/21/2013    [facility]; [place], [place]    S/P prostatectomy 2003    S/P thoracic aortic aneurysm repair 2/21/2013    Seasonal allergies 10/17/2013    Valvular heart disease        Past Surgical History:   Procedure Laterality Date    TRANSCATHETER MITRAL VALVE REPAIR Right 4/22/2019    Procedure: REPAIR, MITRAL VALVE, TRANSCATHETER- AND ANY ASSOCIATED PROCEDURES INCLUDING ARTAIL SEPTAL DEFECT CLOSURE AND PERMANENT PACE MAKER PLACEMENT;  Surgeon: Neto Ramirez M.D.;  Location: SURGERY [facility];  Service: Cardiac    TRAVIS  4/22/2019    Procedure: ECHOCARDIOGRAM, TRANSESOPHAGEAL;  Surgeon: Neto Ramirez M.D.;  Location: SURGERY [facility];  Service: Cardiac    RECOVERY  12/29/2014    Performed by Cath-Recovery Surgery at SURGERY SAME DAY [facility]    AORTIC VALVE REPLACEMENT  2/21/13    Bovine tissur valve with ascending aortic repair..  Done in [place].    SIERRA CARDIAC CATH  2/2013    normal coronaries prior to AVR.    OTHER  2013    carcinoma removed from lip    PROSTATECTOMY, RADIAL  2003.    prostate cancer.    APPENDECTOMY      MITRAL VALVE REPLACE      S/P lisa Clip X1    OTHER CARDIAC SURGERY  2000 and 2013    aVR.  Thoracic aortic aneurysm repair.       Social History     Socioeconomic History    Marital status:      Spouse name: Not on file    Number of children: Not on file    Years of education: Not on file    Highest education level: Not on file   Occupational History    Not on file   Tobacco Use    Smoking status: Never    Smokeless tobacco: Never   Vaping Use    Vaping Use: Never used   Substance and Sexual Activity    Alcohol use: Yes     Alcohol/week: 0.6 oz     Types: 1 Glasses of wine per week     Comment: 1/day    Drug use: No    Sexual activity: Yes     Partners: Female   Other Topics Concern    Not on file   Social History Narrative    Retired . . Moved to [place] in .     Social Determinants of Health     Financial Resource Strain: Not on file   Food Insecurity: Not on file   Transportation Needs: Not on file   Physical Activity: Not on file   Stress: Not on file   Social Connections: Not on file   Intimate Partner Violence: Not on file   Housing Stability: Not on file       Family History   Problem Relation Age of Onset    Other Mother          at age 82. Parkinson's disease.    Cancer Father          at age 68. Prostate cancer.    Cancer Sister         Lung    Heart Disease Neg Hx        No Known Allergies    Current Facility-Administered Medications   Medication Dose Route Frequency Provider Last Rate Last Admin    lidocaine (XYLOCAINE) 1 % injection 0.5 mL  0.5 mL Intradermal Once PRN Zan Hernandes M.D.        lactated ringers infusion   Intravenous Continuous Zan Hernandes M.D. 10 mL/hr at 22 0802 New Bag at 22 0802    FENTANYL CITRATE (PF) 0.05 MG/ML INJ SOLN (WRAPPED)             MIDAZOLAM HCL 2 MG/2ML INJ SOLN (WRAPPED)             BUPIVACAINE HCL (PF) 0.25 % INJ SOLN             LIDOCAINE HCL (PF) 2 % INJ SOLN             CEFAZOLIN SODIUM 1 G INJ SOLR                Physical Exam:  Vitals:    22 1348 22 0718   BP:  (!) 143/80   Pulse:   "66   Resp:  18   Temp:  36.5 °C (97.7 °F)   TempSrc:  Temporal   SpO2:  96%   Weight: 90.9 kg (200 lb 6.4 oz) 88.6 kg (195 lb 5.2 oz)   Height: 1.88 m (6' 2\") 1.88 m (6' 2\")     General appearance: NAD, conversant   Neck: Trachea midline; FROM, supple, no thyromegaly or lymphadenopathy  CV: RRR, no MRGs, no JVD   Extremities: No peripheral edema or extremity lymphadenopathy  Skin: Normal temperature, turgor and texture; no rash, ulcers or subcutaneous nodules  Psych: Appropriate affect, alert and oriented to person, place and time    Data:  Lab Results   Component Value Date/Time    CHOLSTRLTOT 156 06/22/2022 09:23 AM    LDL 81 06/22/2022 09:23 AM    HDL 61 06/22/2022 09:23 AM    TRIGLYCERIDE 70 06/22/2022 09:23 AM       Lab Results   Component Value Date/Time    SODIUM 140 08/12/2022 02:04 PM    POTASSIUM 4.2 08/12/2022 02:04 PM    CHLORIDE 109 08/12/2022 02:04 PM    CO2 21 08/12/2022 02:04 PM    GLUCOSE 111 (H) 08/12/2022 02:04 PM    BUN 20 08/12/2022 02:04 PM    CREATININE 1.28 08/12/2022 02:04 PM    BUNCREATRAT 13 02/01/2019 07:31 AM     Lab Results   Component Value Date/Time    ALKPHOSPHAT 88 08/12/2022 02:04 PM    ASTSGOT 19 08/12/2022 02:04 PM    ALTSGPT 10 08/12/2022 02:04 PM    TBILIRUBIN 0.6 08/12/2022 02:04 PM      Lab Results   Component Value Date/Time    BNPBTYPENAT 63 04/22/2019 02:00 PM               EKG interpreted by me: Biv paced    Impression/Plan:  1) PPM at RIO    Plan CRTP changeout, risks and benefits discussed      Zan Hernandes MD  Cardiac Electrophysiology    "

## 2022-08-16 NOTE — OR NURSING
Patient A+Ox4. Denies pain or nausea.  VSS.  Patient plan to discharge home today.  Wife updated with patient plan

## 2022-08-24 ENCOUNTER — NON-PROVIDER VISIT (OUTPATIENT)
Dept: CARDIOLOGY | Facility: MEDICAL CENTER | Age: 82
End: 2022-08-24
Payer: MEDICARE

## 2022-08-24 DIAGNOSIS — Z95.0 BIVENTRICULAR CARDIAC PACEMAKER IN SITU: Chronic | ICD-10-CM

## 2022-08-24 DIAGNOSIS — I44.2 AV BLOCK, 3RD DEGREE (HCC): ICD-10-CM

## 2022-08-24 PROCEDURE — 93281 PM DEVICE PROGR EVAL MULTI: CPT | Performed by: INTERNAL MEDICINE

## 2022-10-06 ENCOUNTER — NON-PROVIDER VISIT (OUTPATIENT)
Dept: CARDIOLOGY | Facility: MEDICAL CENTER | Age: 82
End: 2022-10-06
Payer: MEDICARE

## 2022-10-06 DIAGNOSIS — Z95.0 BIVENTRICULAR CARDIAC PACEMAKER IN SITU: Chronic | ICD-10-CM

## 2022-10-06 DIAGNOSIS — I44.0 FIRST DEGREE ATRIOVENTRICULAR BLOCK: ICD-10-CM

## 2022-10-06 PROCEDURE — 93281 PM DEVICE PROGR EVAL MULTI: CPT | Performed by: INTERNAL MEDICINE

## 2022-11-02 ENCOUNTER — PATIENT MESSAGE (OUTPATIENT)
Dept: HEALTH INFORMATION MANAGEMENT | Facility: OTHER | Age: 82
End: 2022-11-02

## 2022-11-04 RX ORDER — LOSARTAN POTASSIUM 50 MG/1
TABLET ORAL
Qty: 90 TABLET | Refills: 3 | Status: SHIPPED | OUTPATIENT
Start: 2022-11-04 | End: 2023-10-09

## 2022-11-08 ENCOUNTER — OFFICE VISIT (OUTPATIENT)
Dept: CARDIOLOGY | Facility: MEDICAL CENTER | Age: 82
End: 2022-11-08
Payer: MEDICARE

## 2022-11-08 VITALS — WEIGHT: 197 LBS | BODY MASS INDEX: 25.28 KG/M2 | HEIGHT: 74 IN

## 2022-11-08 DIAGNOSIS — Z98.890 S/P MITRAL VALVE CLIP IMPLANTATION: Chronic | ICD-10-CM

## 2022-11-08 DIAGNOSIS — Z98.890 S/P THORACIC AORTIC ANEURYSM REPAIR: Chronic | ICD-10-CM

## 2022-11-08 DIAGNOSIS — Z86.79 S/P THORACIC AORTIC ANEURYSM REPAIR: Chronic | ICD-10-CM

## 2022-11-08 DIAGNOSIS — Z95.818 S/P MITRAL VALVE CLIP IMPLANTATION: Chronic | ICD-10-CM

## 2022-11-08 DIAGNOSIS — I44.7 LBBB (LEFT BUNDLE BRANCH BLOCK): ICD-10-CM

## 2022-11-08 DIAGNOSIS — Z95.0 BIVENTRICULAR CARDIAC PACEMAKER IN SITU: Chronic | ICD-10-CM

## 2022-11-08 DIAGNOSIS — Z95.2 S/P AVR (AORTIC VALVE REPLACEMENT): Chronic | ICD-10-CM

## 2022-11-08 PROCEDURE — 99214 OFFICE O/P EST MOD 30 MIN: CPT | Performed by: INTERNAL MEDICINE

## 2022-11-08 ASSESSMENT — FIBROSIS 4 INDEX: FIB4 SCORE: 4.75

## 2022-11-08 ASSESSMENT — ENCOUNTER SYMPTOMS
PND: 0
SHORTNESS OF BREATH: 0
ORTHOPNEA: 0
DIZZINESS: 0
COUGH: 0
LOSS OF CONSCIOUSNESS: 0
PALPITATIONS: 0
MYALGIAS: 0

## 2022-11-08 NOTE — PROGRESS NOTES
Chief Complaint   Patient presents with    Aortic Stenosis     F/v Dx: S/P AVR (aortic valve replacement)    Hypertension    Premature Ventricular Contractions (PVCs)       Subjective     Ross Mcneal is a 82 y.o. male who presents today for followup via care.    The patient has medical problems including nonischemic CM (recovered EF), redo AVR (2000 homograft for AS, 2013 pericardial with TAA repair), LBBB, CRT-P 2014, MitraClip 2019, hypertension, chronic PVCs, normal coronary arteries.    Since 11/1/2022 appointment the patient has had no cardiac symptoms including chest pain, palpitations, shortness of breath.  Recently completed a 2000 mile road trip with no cardiac problems    Past Medical History:   Diagnosis Date    Arthritis     hands    Basal cell carcinoma of lip 11/27/2013    Biventricular cardiac pacemaker in situ 3/4/2015    Breath shortness     with exertion    Cancer (HCC)     prostate    GERD (gastroesophageal reflux disease) 10/17/2013    Heart burn     Hypertension      Indigestion     Insomnia 10/17/2013    LBBB (left bundle branch block)      MEDICAL HOME     Pacemaker     Prostate cancer (HCC) 2003    PVCs (premature ventricular contractions) 3/4/2015    S/P AVR (aortic valve replacement) 2/21/2013    redo; [place], [place]    S/P prostatectomy 2003    S/P thoracic aortic aneurysm repair 2/21/2013    Seasonal allergies 10/17/2013    Valvular heart disease      Past Surgical History:   Procedure Laterality Date    TRANSCATHETER MITRAL VALVE REPAIR Right 4/22/2019    Procedure: REPAIR, MITRAL VALVE, TRANSCATHETER- AND ANY ASSOCIATED PROCEDURES INCLUDING ARTAIL SEPTAL DEFECT CLOSURE AND PERMANENT PACE MAKER PLACEMENT;  Surgeon: Neto Ramirez M.D.;  Location: SURGERY [facility];  Service: Cardiac    TRAVIS  4/22/2019    Procedure: ECHOCARDIOGRAM, TRANSESOPHAGEAL;  Surgeon: Neto Ramirez M.D.;  Location: SURGERY [facility];  Service: Cardiac     RECOVERY  2014    Performed by Cath-Recovery Surgery at SURGERY SAME DAY [facility] ORS    AORTIC VALVE REPLACEMENT  13    Bovine tissur valve with ascending aortic repair..  Done in [place].    SIERRA CARDIAC CATH  2013    normal coronaries prior to AVR.    OTHER      carcinoma removed from lip    PROSTATECTOMY, RADIAL  .    prostate cancer.    APPENDECTOMY      MITRAL VALVE REPLACE      S/P lisa Clip X1    OTHER CARDIAC SURGERY   and     aVR. Thoracic aortic aneurysm repair.     Family History   Problem Relation Age of Onset    Other Mother          at age 82. Parkinson's disease.    Cancer Father          at age 68. Prostate cancer.    Cancer Sister         Lung    Heart Disease Neg Hx      Social History     Socioeconomic History    Marital status:      Spouse name: Not on file    Number of children: Not on file    Years of education: Not on file    Highest education level: Not on file   Occupational History    Not on file   Tobacco Use    Smoking status: Never    Smokeless tobacco: Never   Vaping Use    Vaping Use: Never used   Substance and Sexual Activity    Alcohol use: Yes     Alcohol/week: 0.6 oz     Types: 1 Glasses of wine per week     Comment: 1/day    Drug use: No    Sexual activity: Yes     Partners: Female   Other Topics Concern    Not on file   Social History Narrative    Retired . . Moved to [place] in .     Social Determinants of Health     Financial Resource Strain: Not on file   Food Insecurity: Not on file   Transportation Needs: Not on file   Physical Activity: Not on file   Stress: Not on file   Social Connections: Not on file   Intimate Partner Violence: Not on file   Housing Stability: Not on file     No Known Allergies  Outpatient Encounter Medications as of 2022   Medication Sig Dispense Refill    losartan (COZAAR) 50 MG Tab TAKE 1 TABLET BY MOUTH  DAILY 90 Tablet 3    omeprazole (PRILOSEC) 20 MG delayed-release  "capsule TAKE 1 CAPSULE BY MOUTH  DAILY (Patient taking differently: Take 1 Capsule by mouth every day.) 90 Capsule 3    metoprolol SR (TOPROL XL) 25 MG TABLET SR 24 HR TAKE 1 TABLET BY MOUTH  DAILY (Patient taking differently: Take 1 Tablet by mouth every evening.) 90 Tablet 3    Cholecalciferol (VITAMIN D) 400 UNIT TABS Take 1 Tablet by mouth every day.      DiphenhydrAMINE HCl (BENADRYL ALLERGY PO) Take 1 Each by mouth at bedtime as needed. As needed for sleep       aspirin 81 MG tablet Take 1 Tablet by mouth every evening.      [DISCONTINUED] cephALEXin (KEFLEX) 500 MG Cap Take 1 Capsule by mouth 3 times a day. (Patient not taking: Reported on 11/8/2022) 15 Capsule 0     No facility-administered encounter medications on file as of 11/8/2022.     Review of Systems   Respiratory:  Negative for cough and shortness of breath.    Cardiovascular:  Negative for chest pain, palpitations, orthopnea, leg swelling and PND.   Musculoskeletal:  Negative for myalgias.   Neurological:  Negative for dizziness and loss of consciousness.            Objective     BP (P) 124/70 (BP Location: Left arm, Patient Position: Sitting, BP Cuff Size: Adult)   Pulse (P) 70   Resp (P) 12   Ht 1.88 m (6' 2\")   Wt 89.4 kg (197 lb)   SpO2 (P) 96%   BMI 25.29 kg/m²     Physical Exam  Vitals reviewed.   Constitutional:       General: He is not in acute distress.     Appearance: He is well-developed.   Eyes:      Conjunctiva/sclera: Conjunctivae normal.      Pupils: Pupils are equal, round, and reactive to light.   Neck:      Vascular: No JVD.   Cardiovascular:      Rate and Rhythm: Normal rate and regular rhythm.      Pulses:           Carotid pulses are 2+ on the right side and 2+ on the left side.     Heart sounds: Murmur heard.     No friction rub. No gallop.   Pulmonary:      Effort: Pulmonary effort is normal. No accessory muscle usage or respiratory distress.      Breath sounds: Normal breath sounds. No wheezing or rales. "   Musculoskeletal:      Cervical back: Normal range of motion and neck supple.      Right lower leg: No edema.      Left lower leg: No edema.   Skin:     General: Skin is warm and dry.      Findings: No rash.      Nails: There is no clubbing.   Neurological:      Mental Status: He is alert and oriented to person, place, and time.   Psychiatric:         Behavior: Behavior normal.            02/27/2013 ECHOCARDIOGRAM  EF 30-40%. Aortic valve prosthesis. 1-2+ MR. 2+ TR.     12/12/2014 ECHOCARDIOGRAM  Pt has a prosthetic aortic valve with normal motion.  Transvalvular gradients are - Peak: 20 mmHg Mean: 8 mmHg.  Myxomatous change with prolapse of the both mitral leaflet was present.  Eccentric, more than one jets of mild to moderate mitral regurgitation.  Mild to moderately dilated left atrium.  Normal left ventricular chamber size.  Mild concentric left ventricular hypertrophy.  Difficult to determine systolic function due to irregular heart rhythm   but appeared preserved.  Septal bounces probably from conduction abnormality (LBBB).  Normal right ventricular systolic function.  Unable to estimate pulmonary artery pressure due to an inadequate   tricuspid regurgitant jet.     11/11/2015 ECHOCARDIOGRAM  Bovine bioprosthetic aortic valve; normal function.  Normal left ventricular systolic function.  Left ventricular ejection fraction is visually estimated to be 70%.  Asymmetric septal hypertrophy.  Mitral valve prolapse of anterior and posterior leaflets with   myxomatous changes.  Moderate eccentric mitral regurgitation.  Right heart pressures are normal.     11/01/2017 ECHOCARDIOGRAM   Normal left   ventricular systolic function.  Left ventricular ejection fraction is visually estimated to be 70%.  Myxomatous mitral valve leaflet thickening with prolapse of the   anterior and posterior leaflets.  Severe eccentric mitral regurgitation.  Normal function of mechanical prosthetic valve.  Right heart pressures are  normal.  Pacer/ICD wire seen in right ventricle.     02/12/2019 TRAVIS  The left ventricle was normal in size and function.  Left ventricular ejection fraction is visually estimated to be 70%.  Myxomatous mitral valve with prolapse of both the anterior and   posterior leaflets.  Severe mitral regurgitation.  Known bioprosthetic aortic valve that is functioning normally.    ECHOCARDIOGRAM 05/27/2019  Prior study done 4/23/19 - compared to the report of the study done -   there has been no significant change.   Normal left ventricular systolic function.  Left ventricular ejection fraction is visually estimated to be 65%.  Pacer/ICD wire seen in right ventricle.  Known transcatheter mitral valve repair which is functioning normally   with appropriate transvalvular gradient.  Mean transvalvular gradient is 2 mmHg   Normal function of bioprosthetic valve which is working well.  Vmax is  2.40 m/s. Transvalvular gradients are - Peak: 23 mmHg, Mean:   11 mmHg.   Right ventricular systolic pressure is estimated to be 20 mmHg.    ECHOCARDIOGRAM 04/01/2020  Prior study done 5/27/19 - compared to the report of the study done - there has been no significant change.   Normal left ventricular systolic function.  Left ventricular ejection fraction is visually estimated to be 65%.  Known transcatheter mitral valve repair which is functioning normally with appropriate transvalvular gradient.  Mild mitral regurgitation.  Mean transvalvular gradient is 2 mmHg  Normal function of bioprosthetic aortic valve.  Vmax is 2.23  m/s. Transvalvular gradients are - Peak: 20 mmHg, Mean: 11 mmHg  Right ventricular systolic pressure is estimated to be 20 mmHg.    /08/2013 EKG: Normal sinus rhythm, rate 67. First degree AV block. Left bundle branch block. Unchanged since 2/22/2013.     12/08/2014 EKG: Normal sinus rhythm, rate 92. First degree AV block. Left bundle branch block. Intermittent left anterior hemiblock. Isolated PVCs.    Assessment & Plan      1. S/P AVR (aortic valve replacement)        2. S/P mitral valve clip implantation        3. S/P thoracic aortic aneurysm repair        4. LBBB (left bundle branch block)        5. Biventricular cardiac pacemaker in situ            Medical Decision Making: Today's Assessment/Status/Plan:   Assessment  1.  S/P redo AVR 2/21/2013 23 mm composite pericardial tissue valve TAA repair 28 mm Valsalva sleeve graft for degenerated homograft AVR with severe aortic insufficiency, [facility].     2.  AVR, homograft, 2000, critical aortic stenosis, [place], [place]  3.  CRT-P 12/29/2014.   4.  Nonischemic cardiomyopathy, resolved post CRT-P  5.  S/P MitraClip 4/2019.  Myxomatous mitral valve disease.  6.  Hypertension.     7.  Syncope, 2014  8.  Palpitations. Chronic. Related to PVCs now controlled.   9.  PVCs. Chronic.  10.  History of LBBB.    Recommendation/Discussion  1.   The patient's current cardiac status is stable regarding has valvular disease, hypertension and CRT function.  2.  Medications reviewed, continue current cardiac therapy.  3.  RTC 9 months with echocardiogram prior to next appointment.

## 2022-11-15 ENCOUNTER — NON-PROVIDER VISIT (OUTPATIENT)
Dept: CARDIOLOGY | Facility: MEDICAL CENTER | Age: 82
End: 2022-11-15
Payer: MEDICARE

## 2022-11-15 PROCEDURE — 93294 REM INTERROG EVL PM/LDLS PM: CPT | Performed by: INTERNAL MEDICINE

## 2022-11-15 NOTE — CARDIAC REMOTE MONITOR - SCAN
Device transmission reviewed. Device demonstrated appropriate function.       Electronically Signed by: Zan Hernandes M.D.    11/15/2022  12:38 PM

## 2023-01-11 RX ORDER — METOPROLOL SUCCINATE 25 MG/1
TABLET, EXTENDED RELEASE ORAL
Qty: 90 TABLET | Refills: 3 | Status: SHIPPED | OUTPATIENT
Start: 2023-01-11 | End: 2023-12-11

## 2023-02-22 ENCOUNTER — NON-PROVIDER VISIT (OUTPATIENT)
Dept: CARDIOLOGY | Facility: MEDICAL CENTER | Age: 83
End: 2023-02-22
Payer: MEDICARE

## 2023-02-22 PROCEDURE — 93294 REM INTERROG EVL PM/LDLS PM: CPT | Performed by: INTERNAL MEDICINE

## 2023-02-22 NOTE — CARDIAC REMOTE MONITOR - SCAN
Device transmission reviewed. Device demonstrated appropriate function.       Electronically Signed by: Jam Lacy M.D.    3/6/2023  2:05 PM

## 2023-03-28 NOTE — TELEPHONE ENCOUNTER
Tried to contact patient, left message for patient to call [phone]

## 2023-05-05 ENCOUNTER — TELEPHONE (OUTPATIENT)
Dept: HEALTH INFORMATION MANAGEMENT | Facility: OTHER | Age: 83
End: 2023-05-05
Payer: MEDICARE

## 2023-05-11 ENCOUNTER — APPOINTMENT (RX ONLY)
Dept: URBAN - METROPOLITAN AREA CLINIC 22 | Facility: CLINIC | Age: 83
Setting detail: DERMATOLOGY
End: 2023-05-11

## 2023-05-11 DIAGNOSIS — L57.0 ACTINIC KERATOSIS: ICD-10-CM

## 2023-05-11 DIAGNOSIS — D18.0 HEMANGIOMA: ICD-10-CM

## 2023-05-11 DIAGNOSIS — Z85.828 PERSONAL HISTORY OF OTHER MALIGNANT NEOPLASM OF SKIN: ICD-10-CM

## 2023-05-11 DIAGNOSIS — Z71.89 OTHER SPECIFIED COUNSELING: ICD-10-CM

## 2023-05-11 DIAGNOSIS — D22 MELANOCYTIC NEVI: ICD-10-CM

## 2023-05-11 DIAGNOSIS — L81.4 OTHER MELANIN HYPERPIGMENTATION: ICD-10-CM

## 2023-05-11 DIAGNOSIS — L82.1 OTHER SEBORRHEIC KERATOSIS: ICD-10-CM

## 2023-05-11 PROBLEM — D22.5 MELANOCYTIC NEVI OF TRUNK: Status: ACTIVE | Noted: 2023-05-11

## 2023-05-11 PROBLEM — D48.5 NEOPLASM OF UNCERTAIN BEHAVIOR OF SKIN: Status: ACTIVE | Noted: 2023-05-11

## 2023-05-11 PROBLEM — D18.01 HEMANGIOMA OF SKIN AND SUBCUTANEOUS TISSUE: Status: ACTIVE | Noted: 2023-05-11

## 2023-05-11 PROCEDURE — ? ADDITIONAL NOTES

## 2023-05-11 PROCEDURE — ? BIOPSY BY SHAVE METHOD

## 2023-05-11 PROCEDURE — 99203 OFFICE O/P NEW LOW 30 MIN: CPT | Mod: 25

## 2023-05-11 PROCEDURE — ? LIQUID NITROGEN

## 2023-05-11 PROCEDURE — 11102 TANGNTL BX SKIN SINGLE LES: CPT

## 2023-05-11 PROCEDURE — 11103 TANGNTL BX SKIN EA SEP/ADDL: CPT

## 2023-05-11 PROCEDURE — 17000 DESTRUCT PREMALG LESION: CPT | Mod: 59

## 2023-05-11 PROCEDURE — ? COUNSELING

## 2023-05-11 PROCEDURE — ? SUNSCREEN RECOMMENDATIONS

## 2023-05-11 ASSESSMENT — LOCATION ZONE DERM
LOCATION ZONE: TRUNK
LOCATION ZONE: EAR
LOCATION ZONE: ARM

## 2023-05-11 ASSESSMENT — LOCATION SIMPLE DESCRIPTION DERM
LOCATION SIMPLE: CHEST
LOCATION SIMPLE: LEFT EAR
LOCATION SIMPLE: LEFT UPPER BACK
LOCATION SIMPLE: LEFT FOREARM
LOCATION SIMPLE: RIGHT SHOULDER
LOCATION SIMPLE: RIGHT LOWER BACK

## 2023-05-11 ASSESSMENT — LOCATION DETAILED DESCRIPTION DERM
LOCATION DETAILED: LEFT PROXIMAL DORSAL FOREARM
LOCATION DETAILED: LEFT SUPERIOR CRUS OF ANTIHELIX
LOCATION DETAILED: LEFT INFERIOR UPPER BACK
LOCATION DETAILED: RIGHT SUPERIOR LATERAL MIDBACK
LOCATION DETAILED: RIGHT POSTERIOR SHOULDER
LOCATION DETAILED: RIGHT MEDIAL INFERIOR CHEST

## 2023-05-11 NOTE — PROCEDURE: LIQUID NITROGEN
Show Aperture Variable?: Yes
Detail Level: Detailed
Number Of Freeze-Thaw Cycles: 2 freeze-thaw cycles
Duration Of Freeze Thaw-Cycle (Seconds): 3
Post-Care Instructions: I reviewed with the patient in detail post-care instructions. Patient is to wear sunprotection, and avoid picking at any of the treated lesions. Pt may apply Vaseline to crusted or scabbing areas.
Render Post-Care Instructions In Note?: no
Consent: The patient's consent was obtained including but not limited to risks of crusting, scabbing, blistering, scarring, darker or lighter pigmentary change, recurrence, incomplete removal and infection.

## 2023-05-11 NOTE — PROCEDURE: BIOPSY BY SHAVE METHOD
Detail Level: Detailed
Depth Of Biopsy: dermis
Was A Bandage Applied: Yes
Size Of Lesion In Cm: 0.9
X Size Of Lesion In Cm: 0
Biopsy Type: H and E
Biopsy Method: Dermablade
Anesthesia Type: 1% lidocaine with epinephrine
Anesthesia Volume In Cc: 0.5
Hemostasis: Drysol
Wound Care: Petrolatum
Dressing: bandage
Destruction After The Procedure: No
Type Of Destruction Used: Curettage
Curettage Text: The wound bed was treated with curettage after the biopsy was performed.
Cryotherapy Text: The wound bed was treated with cryotherapy after the biopsy was performed.
Electrodesiccation Text: The wound bed was treated with electrodesiccation after the biopsy was performed.
Electrodesiccation And Curettage Text: The wound bed was treated with electrodesiccation and curettage after the biopsy was performed.
Silver Nitrate Text: The wound bed was treated with silver nitrate after the biopsy was performed.
Lab: [id number]
Lab Facility: 
Consent: Written consent was obtained and risks were reviewed including but not limited to scarring, infection, bleeding, scabbing, incomplete removal, nerve damage and allergy to anesthesia.
Post-Care Instructions: I reviewed with the patient in detail post-care instructions. Patient is to keep the biopsy site dry overnight, and then apply bacitracin twice daily until healed. Patient may apply hydrogen peroxide soaks to remove any crusting.
Notification Instructions: Patient will be notified of biopsy results. However, patient instructed to call the office if not contacted within 2 weeks.
Billing Type: Third-Party Bill
Information: Selecting Yes will display possible errors in your note based on the variables you have selected. This validation is only offered as a suggestion for you. PLEASE NOTE THAT THE VALIDATION TEXT WILL BE REMOVED WHEN YOU FINALIZE YOUR NOTE. IF YOU WANT TO FAX A PRELIMINARY NOTE YOU WILL NEED TO TOGGLE THIS TO 'NO' IF YOU DO NOT WANT IT IN YOUR FAXED NOTE.
Size Of Lesion In Cm: 1.1

## 2023-05-11 NOTE — PROCEDURE: ADDITIONAL NOTES
Render Risk Assessment In Note?: no
Additional Notes: 8-9 years ago, pt states it was above right upper lip
Detail Level: Simple

## 2023-05-16 ENCOUNTER — OFFICE VISIT (OUTPATIENT)
Dept: MEDICAL GROUP | Facility: PHYSICIAN GROUP | Age: 83
End: 2023-05-16
Payer: MEDICARE

## 2023-05-16 VITALS
OXYGEN SATURATION: 96 % | HEIGHT: 74 IN | TEMPERATURE: 98.3 F | SYSTOLIC BLOOD PRESSURE: 136 MMHG | DIASTOLIC BLOOD PRESSURE: 72 MMHG | WEIGHT: 199.13 LBS | BODY MASS INDEX: 25.55 KG/M2 | RESPIRATION RATE: 18 BRPM | HEART RATE: 76 BPM

## 2023-05-16 DIAGNOSIS — R73.03 PREDIABETES: Chronic | ICD-10-CM

## 2023-05-16 DIAGNOSIS — Z85.46 H/O PROSTATE CANCER: Chronic | ICD-10-CM

## 2023-05-16 DIAGNOSIS — N18.31 STAGE 3A CHRONIC KIDNEY DISEASE: ICD-10-CM

## 2023-05-16 DIAGNOSIS — K21.9 GASTROESOPHAGEAL REFLUX DISEASE WITHOUT ESOPHAGITIS: Chronic | ICD-10-CM

## 2023-05-16 DIAGNOSIS — D69.6 THROMBOCYTOPENIA (HCC): Chronic | ICD-10-CM

## 2023-05-16 DIAGNOSIS — Z95.2 S/P AVR (AORTIC VALVE REPLACEMENT): Chronic | ICD-10-CM

## 2023-05-16 DIAGNOSIS — I10 ESSENTIAL HYPERTENSION: Chronic | ICD-10-CM

## 2023-05-16 PROBLEM — R19.5 FECAL OCCULT BLOOD TEST POSITIVE: Status: RESOLVED | Noted: 2021-03-09 | Resolved: 2023-05-16

## 2023-05-16 PROCEDURE — G0439 PPPS, SUBSEQ VISIT: HCPCS | Performed by: INTERNAL MEDICINE

## 2023-05-16 PROCEDURE — 3075F SYST BP GE 130 - 139MM HG: CPT | Performed by: INTERNAL MEDICINE

## 2023-05-16 PROCEDURE — 3078F DIAST BP <80 MM HG: CPT | Performed by: INTERNAL MEDICINE

## 2023-05-16 ASSESSMENT — PATIENT HEALTH QUESTIONNAIRE - PHQ9: CLINICAL INTERPRETATION OF PHQ2 SCORE: 0

## 2023-05-16 ASSESSMENT — FIBROSIS 4 INDEX: FIB4 SCORE: 4.81

## 2023-05-16 ASSESSMENT — ENCOUNTER SYMPTOMS: GENERAL WELL-BEING: GOOD

## 2023-05-16 ASSESSMENT — ACTIVITIES OF DAILY LIVING (ADL): BATHING_REQUIRES_ASSISTANCE: 0

## 2023-05-16 NOTE — ASSESSMENT & PLAN NOTE
Chronic condition.  Previous platelet level in the 100s range.  Patient take aspirin 81 mg daily.  He denies any history of bleeding.  Lab test ordered for follow-up

## 2023-05-16 NOTE — ASSESSMENT & PLAN NOTE
Patient status post prostatectomy 2003.  Patient denies fever chills dysuria or hematuria.    The patient declined further PSA testing.  He also declined urology referral.

## 2023-05-16 NOTE — ASSESSMENT & PLAN NOTE
Chronic condition.  Previous GFR in the 50s.  Advised the patient to avoid NSAIDs.  Lab test ordered for follow-up.

## 2023-05-16 NOTE — ASSESSMENT & PLAN NOTE
Chronic ongoing condition.  The patient tolerating medication well including losartan 50 mg daily and metoprolol 25 mg daily.  No significant side effects reported.

## 2023-05-16 NOTE — PROGRESS NOTES
Chief Complaint   Patient presents with    Annual Wellness Visit       HPI:  Ross Mcneal is a 83 y.o. here for Medicare Annual Wellness Visit     Patient Active Problem List    Diagnosis Date Noted    Finger numbness 06/22/2022    Encounter for removal of sutures 06/09/2022    Open wound of finger 06/09/2022    Stage 3a chronic kidney disease (HCC) 09/02/2021    Fecal occult blood test positive 03/09/2021    Thrombocytopenia (HCC) 03/02/2021    S/P mitral valve clip implantation 03/16/2017    H/O prostate cancer 03/13/2017    Essential hypertension 09/15/2016    Bilateral hip pain 08/10/2015    Biventricular cardiac pacemaker in situ 03/04/2015    Prediabetes 02/12/2015    First degree atrioventricular block 12/29/2014    H/O nonmelanoma skin cancer 11/27/2013    Seasonal allergies 10/17/2013    GERD (gastroesophageal reflux disease) 10/17/2013    Insomnia 10/17/2013    LBBB (left bundle branch block) 05/08/2013    S/P AVR (aortic valve replacement) 05/08/2013    S/P thoracic aortic aneurysm repair 05/08/2013       Current Outpatient Medications   Medication Sig Dispense Refill    metoprolol SR (TOPROL XL) 25 MG TABLET SR 24 HR TAKE 1 TABLET BY MOUTH  DAILY 90 Tablet 3    losartan (COZAAR) 50 MG Tab TAKE 1 TABLET BY MOUTH  DAILY 90 Tablet 3    omeprazole (PRILOSEC) 20 MG delayed-release capsule TAKE 1 CAPSULE BY MOUTH  DAILY (Patient taking differently: Take 20 mg by mouth every day.) 90 Capsule 3    Cholecalciferol (VITAMIN D) 400 UNIT TABS Take 1 Tablet by mouth every day.      DiphenhydrAMINE HCl (BENADRYL ALLERGY PO) Take 1 Each by mouth at bedtime as needed. As needed for sleep       aspirin 81 MG tablet Take 1 Tablet by mouth every evening.       No current facility-administered medications for this visit.          Current supplements as per medication list.     Allergies: Patient has no known allergies.    Current social contact/activities: Yes     He  reports that he has never smoked. He has never  used smokeless tobacco. He reports current alcohol use of about 0.6 oz of alcohol per week. He reports that he does not use drugs.  Counseling given: Not Answered      ROS:    Gait: Uses no assistive device  Ostomy: No  Other tubes: No  Amputations: No  Chronic oxygen use: No  Last eye exam: 04/2023  Wears hearing aids: No   : Denies any urinary leakage during the last 6 months    Screening:    Depression Screening  Little interest or pleasure in doing things?  0 - not at all  Feeling down, depressed , or hopeless? 0 - not at all  Patient Health Questionnaire Score: 0     If depressive symptoms identified deferred to follow up visit unless specifically addressed in assessment and plan.    Interpretation of PHQ-9 Total Score   Score Severity   1-4 No Depression   5-9 Mild Depression   10-14 Moderate Depression   15-19 Moderately Severe Depression   20-27 Severe Depression    Screening for Cognitive Impairment  Three Minute Recall (daughter, heaven, mountain) 3/3    Alexis clock face with all 12 numbers and set the hands to show 10 past 11.  Yes    Cognitive concerns identified deferred for follow up unless specifically addressed in assessment and plan.    Fall Risk Assessment  Has the patient had two or more falls in the last year or any fall with injury in the last year?  No    Safety Assessment  Throw rugs on floor.  Yes  Handrails on all stairs.  Yes  Good lighting in all hallways.  Yes  Difficulty hearing.  Yes  Patient counseled about all safety risks that were identified.    Functional Assessment ADLs  Are there any barriers preventing you from cooking for yourself or meeting nutritional needs?  No.    Are there any barriers preventing you from driving safely or obtaining transportation?  No.    Are there any barriers preventing you from using a telephone or calling for help?  No.    Are there any barriers preventing you from shopping?  No.    Are there any barriers preventing you from taking care of your own  finances?  No.    Are there any barriers preventing you from managing your medications?  No.    Are there any barriers preventing you from showering, bathing or dressing yourself?  No.    Are you currently engaging in any exercise or physical activity?  Yes.     What is your perception of your health?  Good    Advance Care Planning  Do you have an Advance Directive, Living Will, Durable Power of , or POLST? Yes  Advance Directive       is on file      Health Maintenance Summary            Overdue - IMM ZOSTER VACCINES (2 of 3) Overdue since 6/19/2007 04/24/2007  Imm Admin: Zoster Vaccine Live (ZVL) (Zostavax) - HISTORICAL DATA              Overdue - Annual Wellness Visit (Every 366 Days) Overdue since 6/6/2019 06/05/2018  Visit Dx: Medicare annual wellness visit, subsequent    03/13/2017  Visit Dx: Medicare annual wellness visit, subsequent    08/07/2015  Visit Dx: Medicare annual wellness visit, subsequent              Postponed - IMM DTaP/Tdap/Td Vaccine (1 - Tdap) Postponed until 6/22/2023 05/31/2022  Imm Admin: TD Vaccine              IMM PNEUMOCOCCAL VACCINE: 65+ Years (Series Information) Completed      07/11/2016  Imm Admin: Pneumococcal Conjugate Vaccine (Prevnar/PCV-13)    12/29/2010  Imm Admin: Pneumococcal polysaccharide vaccine (PPSV-23)              IMM INFLUENZA (Series Information) Completed      10/06/2022  Imm Admin: Influenza Vaccine Adult HD    10/14/2021  Imm Admin: Influenza Vaccine Adult HD    09/14/2020  Imm Admin: Influenza (IM) Preservative Free - HISTORICAL DATA    09/19/2019  Imm Admin: Influenza Vaccine Adult HD    10/09/2018  Imm Admin: Influenza Vaccine Adult HD    Only the first 5 history entries have been loaded, but more history exists.              COVID-19 Vaccine (Series Information) Completed      10/06/2022  Imm Admin: PFIZER BIVALENT BOOSTER SARS-COV-2 VACCINE (12+)    04/05/2022  Imm Admin: PFIZER CAR CAP SARS-COV-2 VACCINATION (12+)    09/30/2021  Imm  Admin: PFIZER PURPLE CAP SARS-COV-2 VACCINATION (12+)    2021  Imm Admin: PFIZER PURPLE CAP SARS-COV-2 VACCINATION (12+)    2021  Imm Admin: PFIZER PURPLE CAP SARS-COV-2 VACCINATION (12+)              IMM HEP B VACCINE (Series Information) Aged Out      No completion history exists for this topic.              HPV Vaccines (Series Information) Aged Out      No completion history exists for this topic.              IMM MENINGOCOCCAL ACWY VACCINE (Series Information) Aged Out      No completion history exists for this topic.              Discontinued - COLORECTAL CANCER SCREENING  Discontinued        Frequency changed to Never automatically (Topic No Longer Applies)    2021  REFERRAL TO GI FOR COLONOSCOPY    2021  OCCULT BLOOD FECES IMMUNOASSAY    2018  REFERRAL TO GI FOR COLONOSCOPY    2012  AMB REFERRAL TO GI FOR COLONOSCOPY                    Patient Care Team:  Rao Quezada M.D. as PCP - General (Internal Medicine)  Rashard Conte M.D. as Consulting Physician (Cardiovascular Disease (Cardiology))  [facility] as Consulting Physician  Blayne Ruiz M.D. (Inactive) as Consulting Physician (Ophthalmology)  Yehuda Morgan M.D. as Consulting Physician (Thoracic Surgery (Cardiothoracic Vascular Surgery))        Social History     Tobacco Use    Smoking status: Never    Smokeless tobacco: Never   Vaping Use    Vaping Use: Never used   Substance Use Topics    Alcohol use: Yes     Alcohol/week: 0.6 oz     Types: 1 Glasses of wine per week     Comment: 1/day    Drug use: No     Family History   Problem Relation Age of Onset    Other Mother          at age 82. Parkinson's disease.    Cancer Father          at age 68. Prostate cancer.    Cancer Sister         Lung    Heart Disease Neg Hx      He  has a past medical history of Arthritis, Basal cell carcinoma of lip (2013), Biventricular cardiac pacemaker in situ (3/4/2015), Breath shortness, Cancer  "([facility]), GERD (gastroesophageal reflux disease) (10/17/2013), Heart burn, Hypertension ( ), Indigestion, Insomnia (10/17/2013), LBBB (left bundle branch block) ( ), MEDICAL HOME, Pacemaker, Prostate cancer (HCC) (2003), PVCs (premature ventricular contractions) (3/4/2015), S/P AVR (aortic valve replacement) (2/21/2013), S/P prostatectomy (2003), S/P thoracic aortic aneurysm repair (2/21/2013), Seasonal allergies (10/17/2013), and Valvular heart disease.   Past Surgical History:   Procedure Laterality Date    TRANSCATHETER MITRAL VALVE REPAIR Right 4/22/2019    Procedure: REPAIR, MITRAL VALVE, TRANSCATHETER- AND ANY ASSOCIATED PROCEDURES INCLUDING ARTAIL SEPTAL DEFECT CLOSURE AND PERMANENT PACE MAKER PLACEMENT;  Surgeon: Neto Ramirez M.D.;  Location: SURGERY [facility];  Service: Cardiac    TRAVIS  4/22/2019    Procedure: ECHOCARDIOGRAM, TRANSESOPHAGEAL;  Surgeon: Neto Ramirez M.D.;  Location: SURGERY [facility];  Service: Cardiac    RECOVERY  12/29/2014    Performed by Cath-Recovery Surgery at SURGERY SAME DAY [facility]    AORTIC VALVE REPLACEMENT  2/21/13    Bovine tissur valve with ascending aortic repair..  Done in [place].    [facility] CARDIAC CATH  2/2013    normal coronaries prior to AVR.    OTHER  2013    carcinoma removed from lip    PROSTATECTOMY, RADIAL  2003.    prostate cancer.    APPENDECTOMY      MITRAL VALVE REPLACE      S/P lisa Clip X1    OTHER CARDIAC SURGERY  2000 and 2013    aVR. Thoracic aortic aneurysm repair.       Exam:   BP (!) 142/72 (BP Location: Left arm, Patient Position: Sitting, BP Cuff Size: Adult)   Pulse 76   Temp 36.8 °C (98.3 °F) (Temporal)   Resp 18   Ht 1.88 m (6' 2\")   Wt 90.3 kg (199 lb 2 oz)   SpO2 96%  Body mass index is 25.57 kg/m².    Hearing fair.    Dentition fair  Alert, oriented in no acute distress.  Eye contact is good, speech goal directed, affect calm    Assessment and Plan. The following treatment and monitoring plan is recommended:      S/P AVR " (aortic valve replacement)  Chronic condition.  Patient followed by cardiology service.  The patient is scheduled to undergo echocardiogram in July 2023 and will follow-up with cardiologist after that.  Patient denies chest pain shortness of breath palpitation or near syncope.    H/O prostate cancer  Patient status post prostatectomy 2003.  Patient denies fever chills dysuria or hematuria.    The patient declined further PSA testing.  He also declined urology referral.    Stage 3a chronic kidney disease (HCC)  Chronic condition.  Previous GFR in the 50s.  Advised the patient to avoid NSAIDs.  Lab test ordered for follow-up.    GERD (gastroesophageal reflux disease)  Chronic ongoing condition.  Patient takes omeprazole 20 Mg daily.  The patient denies nausea vomiting dysphagia or unexplained weight loss.    Prediabetes  This is a chronic condition.  The patient on diet therapy.  Lab test ordered for follow-up.    Essential hypertension  Chronic ongoing condition.  The patient tolerating medication well including losartan 50 mg daily and metoprolol 25 mg daily.  No significant side effects reported.    Thrombocytopenia (HCC)  Chronic condition.  Previous platelet level in the 100s range.  Patient take aspirin 81 mg daily.  He denies any history of bleeding.  Lab test ordered for follow-up         Health Care Screening: Age-appropriate preventive services recommended by USPTF and ACIP covered by Medicare were discussed today. Services ordered if indicated and agreed upon by the patient.  Referrals offered: Community-based lifestyle interventions to reduce health risks and promote self-management and wellness, fall prevention, nutrition, physical activity, tobacco-use cessation, weight loss, and mental health services as per orders if indicated.    Discussion today about general wellness and lifestyle habits:    Prevent falls and reduce trip hazards; Cautioned about securing or removing rugs.  Have a working fire alarm  and carbon monoxide detector;   Engage in regular physical activity and social activities

## 2023-05-16 NOTE — ASSESSMENT & PLAN NOTE
Chronic condition.  Patient followed by cardiology service.  The patient is scheduled to undergo echocardiogram in July 2023 and will follow-up with cardiologist after that.  Patient denies chest pain shortness of breath palpitation or near syncope.

## 2023-05-16 NOTE — ASSESSMENT & PLAN NOTE
Chronic ongoing condition.  Patient takes omeprazole 20 Mg daily.  The patient denies nausea vomiting dysphagia or unexplained weight loss.

## 2023-05-24 ENCOUNTER — HOSPITAL ENCOUNTER (OUTPATIENT)
Dept: LAB | Facility: MEDICAL CENTER | Age: 83
End: 2023-05-24
Attending: INTERNAL MEDICINE
Payer: MEDICARE

## 2023-05-24 ENCOUNTER — NON-PROVIDER VISIT (OUTPATIENT)
Dept: CARDIOLOGY | Facility: MEDICAL CENTER | Age: 83
End: 2023-05-24
Payer: MEDICARE

## 2023-05-24 DIAGNOSIS — D69.6 THROMBOCYTOPENIA (HCC): Chronic | ICD-10-CM

## 2023-05-24 DIAGNOSIS — I10 ESSENTIAL HYPERTENSION: Chronic | ICD-10-CM

## 2023-05-24 DIAGNOSIS — R73.03 PREDIABETES: Chronic | ICD-10-CM

## 2023-05-24 LAB
ANION GAP SERPL CALC-SCNC: 8 MMOL/L (ref 7–16)
BUN SERPL-MCNC: 21 MG/DL (ref 8–22)
CALCIUM SERPL-MCNC: 9.4 MG/DL (ref 8.5–10.5)
CHLORIDE SERPL-SCNC: 110 MMOL/L (ref 96–112)
CHOLEST SERPL-MCNC: 162 MG/DL (ref 100–199)
CO2 SERPL-SCNC: 24 MMOL/L (ref 20–33)
CREAT SERPL-MCNC: 1.12 MG/DL (ref 0.5–1.4)
ERYTHROCYTE [DISTWIDTH] IN BLOOD BY AUTOMATED COUNT: 46 FL (ref 35.9–50)
EST. AVERAGE GLUCOSE BLD GHB EST-MCNC: 120 MG/DL
FASTING STATUS PATIENT QL REPORTED: NORMAL
GFR SERPLBLD CREATININE-BSD FMLA CKD-EPI: 65 ML/MIN/1.73 M 2
GLUCOSE SERPL-MCNC: 97 MG/DL (ref 65–99)
HBA1C MFR BLD: 5.8 % (ref 4–5.6)
HCT VFR BLD AUTO: 42.1 % (ref 42–52)
HDLC SERPL-MCNC: 48 MG/DL
HGB BLD-MCNC: 14.1 G/DL (ref 14–18)
LDLC SERPL CALC-MCNC: 87 MG/DL
MCH RBC QN AUTO: 30.1 PG (ref 27–33)
MCHC RBC AUTO-ENTMCNC: 33.5 G/DL (ref 32.3–36.5)
MCV RBC AUTO: 90 FL (ref 81.4–97.8)
PLATELET # BLD AUTO: 101 K/UL (ref 164–446)
PMV BLD AUTO: 13.1 FL (ref 9–12.9)
POTASSIUM SERPL-SCNC: 4 MMOL/L (ref 3.6–5.5)
RBC # BLD AUTO: 4.68 M/UL (ref 4.7–6.1)
SODIUM SERPL-SCNC: 142 MMOL/L (ref 135–145)
TRIGL SERPL-MCNC: 135 MG/DL (ref 0–149)
WBC # BLD AUTO: 4.5 K/UL (ref 4.8–10.8)

## 2023-05-24 PROCEDURE — 93294 REM INTERROG EVL PM/LDLS PM: CPT | Performed by: INTERNAL MEDICINE

## 2023-05-24 PROCEDURE — 80048 BASIC METABOLIC PNL TOTAL CA: CPT

## 2023-05-24 PROCEDURE — 85027 COMPLETE CBC AUTOMATED: CPT

## 2023-05-24 PROCEDURE — 80061 LIPID PANEL: CPT

## 2023-05-24 PROCEDURE — 83036 HEMOGLOBIN GLYCOSYLATED A1C: CPT | Mod: GA

## 2023-05-24 PROCEDURE — 36415 COLL VENOUS BLD VENIPUNCTURE: CPT

## 2023-05-24 NOTE — CARDIAC REMOTE MONITOR - SCAN
Device transmission reviewed. Device demonstrated appropriate function.       Electronically Signed by: Zan Hernandes M.D.    5/25/2023  7:38 AM

## 2023-06-26 DIAGNOSIS — K21.9 GASTROESOPHAGEAL REFLUX DISEASE WITHOUT ESOPHAGITIS: ICD-10-CM

## 2023-06-27 RX ORDER — OMEPRAZOLE 20 MG/1
CAPSULE, DELAYED RELEASE ORAL
Qty: 90 CAPSULE | Refills: 3 | Status: SHIPPED | OUTPATIENT
Start: 2023-06-27

## 2023-07-03 ENCOUNTER — HOSPITAL ENCOUNTER (OUTPATIENT)
Dept: CARDIOLOGY | Facility: MEDICAL CENTER | Age: 83
End: 2023-07-03
Attending: INTERNAL MEDICINE
Payer: MEDICARE

## 2023-07-03 DIAGNOSIS — Z95.2 S/P AVR (AORTIC VALVE REPLACEMENT): Chronic | ICD-10-CM

## 2023-07-03 DIAGNOSIS — Z98.890 S/P MITRAL VALVE CLIP IMPLANTATION: Chronic | ICD-10-CM

## 2023-07-03 DIAGNOSIS — Z95.818 S/P MITRAL VALVE CLIP IMPLANTATION: Chronic | ICD-10-CM

## 2023-07-03 PROCEDURE — 93306 TTE W/DOPPLER COMPLETE: CPT

## 2023-07-05 LAB
LV EJECT FRACT  99904: 70
LV EJECT FRACT MOD 2C 99903: 58.66
LV EJECT FRACT MOD 4C 99902: 61.9
LV EJECT FRACT MOD BP 99901: 59.83

## 2023-07-05 PROCEDURE — 93306 TTE W/DOPPLER COMPLETE: CPT | Mod: 26 | Performed by: INTERNAL MEDICINE

## 2023-07-06 NOTE — RESULT ENCOUNTER NOTE
Please notify Ross that I have reviewed your echocardiogram and it looks very good with the aortic valve replacement and the mitral valve clip repair are holding up well

## 2023-08-10 ENCOUNTER — APPOINTMENT (OUTPATIENT)
Dept: CARDIOLOGY | Facility: MEDICAL CENTER | Age: 83
End: 2023-08-10
Attending: INTERNAL MEDICINE
Payer: MEDICARE

## 2023-08-11 ENCOUNTER — OFFICE VISIT (OUTPATIENT)
Dept: CARDIOLOGY | Facility: MEDICAL CENTER | Age: 83
End: 2023-08-11
Attending: INTERNAL MEDICINE
Payer: MEDICARE

## 2023-08-11 VITALS
BODY MASS INDEX: 25.67 KG/M2 | SYSTOLIC BLOOD PRESSURE: 122 MMHG | DIASTOLIC BLOOD PRESSURE: 68 MMHG | WEIGHT: 200 LBS | HEIGHT: 74 IN | OXYGEN SATURATION: 98 % | HEART RATE: 72 BPM | RESPIRATION RATE: 16 BRPM

## 2023-08-11 DIAGNOSIS — Z98.890 S/P THORACIC AORTIC ANEURYSM REPAIR: Chronic | ICD-10-CM

## 2023-08-11 DIAGNOSIS — Z86.79 S/P THORACIC AORTIC ANEURYSM REPAIR: Chronic | ICD-10-CM

## 2023-08-11 DIAGNOSIS — I49.3 PVCS (PREMATURE VENTRICULAR CONTRACTIONS): ICD-10-CM

## 2023-08-11 DIAGNOSIS — Z95.2 S/P AVR (AORTIC VALVE REPLACEMENT): Chronic | ICD-10-CM

## 2023-08-11 DIAGNOSIS — Z95.818 S/P MITRAL VALVE CLIP IMPLANTATION: Chronic | ICD-10-CM

## 2023-08-11 DIAGNOSIS — I10 ESSENTIAL HYPERTENSION: Chronic | ICD-10-CM

## 2023-08-11 DIAGNOSIS — Z98.890 S/P MITRAL VALVE CLIP IMPLANTATION: Chronic | ICD-10-CM

## 2023-08-11 DIAGNOSIS — Z95.0 BIVENTRICULAR CARDIAC PACEMAKER IN SITU: Chronic | ICD-10-CM

## 2023-08-11 PROCEDURE — 3078F DIAST BP <80 MM HG: CPT | Performed by: INTERNAL MEDICINE

## 2023-08-11 PROCEDURE — 3074F SYST BP LT 130 MM HG: CPT | Performed by: INTERNAL MEDICINE

## 2023-08-11 PROCEDURE — 99212 OFFICE O/P EST SF 10 MIN: CPT | Performed by: INTERNAL MEDICINE

## 2023-08-11 PROCEDURE — 99214 OFFICE O/P EST MOD 30 MIN: CPT | Performed by: INTERNAL MEDICINE

## 2023-08-11 ASSESSMENT — ENCOUNTER SYMPTOMS
DIZZINESS: 0
MYALGIAS: 0
PND: 0
LOSS OF CONSCIOUSNESS: 0
COUGH: 0
SHORTNESS OF BREATH: 0
ORTHOPNEA: 0
PALPITATIONS: 0

## 2023-08-11 ASSESSMENT — FIBROSIS 4 INDEX: FIB4 SCORE: 5

## 2023-08-11 NOTE — PROGRESS NOTES
Chief Complaint   Patient presents with    Other     F/V Dx: S/P AVR (aortic valve replacement)       Subjective     Ross Mcneal is a 82 y.o. male who presents today for followup via care.    The patient has medical problems including nonischemic CM (recovered EF), redo AVR (2000 homograft for AS, 2013 pericardial with TAA repair), LBBB, CRT-P 2014, MitraClip 2019, hypertension, chronic PVCs, normal coronary arteries.    Since 11/8/2022 appointment the patient has had no cardiac symptoms including chest pain, palpitations, shortness of breath.  He is under tremendous amount of stress due to his wife having progressive worsening Alzheimer's disease.  Fortunately his wife has 2 sons that live locally that are helpful.    Since 11/1/2022 appointment the patient has had no cardiac symptoms including chest pain, palpitations, shortness of breath.  Recently completed a 2000 mile road trip with no cardiac problems    Past Medical History:   Diagnosis Date    Arthritis     hands    Basal cell carcinoma of lip 11/27/2013    Biventricular cardiac pacemaker in situ 3/4/2015    Breath shortness     with exertion    Cancer (HCC)     prostate    GERD (gastroesophageal reflux disease) 10/17/2013    Heart burn     Hypertension      Indigestion     Insomnia 10/17/2013    LBBB (left bundle branch block)      MEDICAL HOME     Pacemaker     Prostate cancer (HCC) 2003    PVCs (premature ventricular contractions) 3/4/2015    S/P AVR (aortic valve replacement) 2/21/2013    redo; [place], [place]    S/P prostatectomy 2003    S/P thoracic aortic aneurysm repair 2/21/2013    Seasonal allergies 10/17/2013    Valvular heart disease      Past Surgical History:   Procedure Laterality Date    TRANSCATHETER MITRAL VALVE REPAIR Right 4/22/2019    Procedure: REPAIR, MITRAL VALVE, TRANSCATHETER- AND ANY ASSOCIATED PROCEDURES INCLUDING ARTAIL SEPTAL DEFECT CLOSURE AND PERMANENT PACE MAKER PLACEMENT;  Surgeon:  Neto Ramirez M.D.;  Location: SURGERY [facility];  Service: Cardiac    TRAVIS  2019    Procedure: ECHOCARDIOGRAM, TRANSESOPHAGEAL;  Surgeon: Neto Ramirez M.D.;  Location: SURGERY [facility];  Service: Cardiac    RECOVERY  2014    Performed by Cath-Recovery Surgery at SURGERY SAME DAY [facility]    AORTIC VALVE REPLACEMENT  13    Bovine tissur valve with ascending aortic repair..  Done in [place].    [facility] CARDIAC CATH  2013    normal coronaries prior to AVR.    OTHER      carcinoma removed from lip    PROSTATECTOMY, RADIAL  .    prostate cancer.    APPENDECTOMY      MITRAL VALVE REPLACE      S/P lisa Clip X1    OTHER CARDIAC SURGERY   and     aVR. Thoracic aortic aneurysm repair.     Family History   Problem Relation Age of Onset    Other Mother          at age 82. Parkinson's disease.    Cancer Father          at age 68. Prostate cancer.    Cancer Sister         Lung    Heart Disease Neg Hx      Social History     Socioeconomic History    Marital status:      Spouse name: Not on file    Number of children: Not on file    Years of education: Not on file    Highest education level: Not on file   Occupational History    Not on file   Tobacco Use    Smoking status: Never    Smokeless tobacco: Never   Vaping Use    Vaping Use: Never used   Substance and Sexual Activity    Alcohol use: Yes     Alcohol/week: 0.6 oz     Types: 1 Glasses of wine per week     Comment: 1/day    Drug use: No    Sexual activity: Not Currently   Other Topics Concern    Not on file   Social History Narrative    Retired . . Moved to [place] in .     Social Determinants of Health     Financial Resource Strain: Not on file   Food Insecurity: Not on file   Transportation Needs: Not on file   Physical Activity: Not on file   Stress: Not on file   Social Connections: Not on file   Intimate Partner Violence: Not on file   Housing Stability: Not on file     No Known  "Allergies  Outpatient Encounter Medications as of 8/11/2023   Medication Sig Dispense Refill    omeprazole (PRILOSEC) 20 MG delayed-release capsule TAKE 1 CAPSULE BY MOUTH  DAILY 90 Capsule 3    metoprolol SR (TOPROL XL) 25 MG TABLET SR 24 HR TAKE 1 TABLET BY MOUTH  DAILY 90 Tablet 3    losartan (COZAAR) 50 MG Tab TAKE 1 TABLET BY MOUTH  DAILY 90 Tablet 3    Cholecalciferol (VITAMIN D) 400 UNIT TABS Take 1 Tablet by mouth every day.      DiphenhydrAMINE HCl (BENADRYL ALLERGY PO) Take 1 Each by mouth at bedtime as needed. As needed for sleep       aspirin 81 MG tablet Take 1 Tablet by mouth every evening.       No facility-administered encounter medications on file as of 8/11/2023.     Review of Systems   Respiratory:  Negative for cough and shortness of breath.    Cardiovascular:  Negative for chest pain, palpitations, orthopnea, leg swelling and PND.   Musculoskeletal:  Negative for myalgias.   Neurological:  Negative for dizziness and loss of consciousness.              Objective     /68 (BP Location: Left arm, Patient Position: Sitting, BP Cuff Size: Adult)   Pulse 72   Resp 16   Ht 1.88 m (6' 2\")   Wt 90.7 kg (200 lb)   SpO2 98%   BMI 25.68 kg/m²     Physical Exam  Vitals reviewed.   Constitutional:       General: He is not in acute distress.     Appearance: He is well-developed.   Eyes:      Conjunctiva/sclera: Conjunctivae normal.      Pupils: Pupils are equal, round, and reactive to light.   Neck:      Vascular: No JVD.   Cardiovascular:      Rate and Rhythm: Normal rate and regular rhythm.      Pulses:           Carotid pulses are 2+ on the right side and 2+ on the left side.       Radial pulses are 1+ on the right side and 1+ on the left side.      Heart sounds: Murmur heard.      No friction rub. No gallop.      Comments: Faint apical systolic murmur  Pulmonary:      Effort: Pulmonary effort is normal. No accessory muscle usage or respiratory distress.      Breath sounds: Normal breath sounds. " No wheezing or rales.   Abdominal:      General: There is no distension.      Palpations: Abdomen is soft. There is no mass.      Tenderness: There is no abdominal tenderness.   Musculoskeletal:      Cervical back: Normal range of motion and neck supple.      Right lower leg: No edema.      Left lower leg: No edema.   Skin:     General: Skin is warm and dry.      Findings: No rash.      Nails: There is no clubbing.   Neurological:      Mental Status: He is alert and oriented to person, place, and time.   Psychiatric:         Behavior: Behavior normal.              ECHOCARDIOGRAM 2/27/2013  EF 30-40%. Aortic valve prosthesis. 1-2+ MR. 2+ TR.     ECHOCARDIOGRAM 12/12/2014  Pt has a prosthetic aortic valve with normal motion.  Transvalvular gradients are - Peak: 20 mmHg Mean: 8 mmHg.  Myxomatous change with prolapse of the both mitral leaflet was present.  Eccentric, more than one jets of mild to moderate mitral regurgitation.  Mild to moderately dilated left atrium.  Normal left ventricular chamber size.  Mild concentric left ventricular hypertrophy.  Difficult to determine systolic function due to irregular heart rhythm   but appeared preserved.  Septal bounces probably from conduction abnormality (LBBB).  Normal right ventricular systolic function.  Unable to estimate pulmonary artery pressure due to an inadequate   tricuspid regurgitant jet.     ECHOCARDIOGRAM 11/11/2015  Bovine bioprosthetic aortic valve; normal function.  Normal left ventricular systolic function.  Left ventricular ejection fraction is visually estimated to be 70%.  Asymmetric septal hypertrophy.  Mitral valve prolapse of anterior and posterior leaflets with   myxomatous changes.  Moderate eccentric mitral regurgitation.  Right heart pressures are normal.     ECHOCARDIOGRAM 11/1/2017   Normal left   ventricular systolic function.  Left ventricular ejection fraction is visually estimated to be 70%.  Myxomatous mitral valve leaflet thickening with  prolapse of the   anterior and posterior leaflets.  Severe eccentric mitral regurgitation.  Normal function of mechanical prosthetic valve.  Right heart pressures are normal.  Pacer/ICD wire seen in right ventricle.     TRAVIS 2/12/2019  The left ventricle was normal in size and function.  Left ventricular ejection fraction is visually estimated to be 70%.  Myxomatous mitral valve with prolapse of both the anterior and   posterior leaflets.  Severe mitral regurgitation.  Known bioprosthetic aortic valve that is functioning normally.    ECHOCARDIOGRAM 05/27/2019  Prior study done 4/23/19 - compared to the report of the study done -   there has been no significant change.   Normal left ventricular systolic function.  Left ventricular ejection fraction is visually estimated to be 65%.  Pacer/ICD wire seen in right ventricle.  Known transcatheter mitral valve repair which is functioning normally   with appropriate transvalvular gradient.  Mean transvalvular gradient is 2 mmHg   Normal function of bioprosthetic valve which is working well.  Vmax is  2.40 m/s. Transvalvular gradients are - Peak: 23 mmHg, Mean:   11 mmHg.   Right ventricular systolic pressure is estimated to be 20 mmHg.    ECHOCARDIOGRAM 04/01/2020  Prior study done 5/27/19 - compared to the report of the study done - there has been no significant change.   Normal left ventricular systolic function.  Left ventricular ejection fraction is visually estimated to be 65%.  Known transcatheter mitral valve repair which is functioning normally with appropriate transvalvular gradient.  Mild mitral regurgitation.  Mean transvalvular gradient is 2 mmHg  Normal function of bioprosthetic aortic valve.  Vmax is 2.23  m/s. Transvalvular gradients are - Peak: 20 mmHg, Mean: 11 mmHg  Right ventricular systolic pressure is estimated to be 20 mmHg.    ECHOCARDIOGRAM 7/3/2023  Normal left ventricular systolic function.  The left ventricular ejection fraction is visually  estimated to be 70%.  Mild concentric left ventricular hypertrophy.  Known bioprosthetic aortic valve that is functioning normally with   normal transvalvular gradients.  Known transcatheter mitral valve repair which is functioning normally   with appropriate transvalvular gradient.  Trace to mild eccentric mitral regurgitation.  The right ventricle is normal in size and systolic function.  Compared to the prior study on 4/1/2020, there is now significant   change.      Assessment & Plan     1. S/P AVR (aortic valve replacement)        2. S/P thoracic aortic aneurysm repair        3. S/P mitral valve clip implantation        4. Biventricular cardiac pacemaker in situ        5. PVCs (premature ventricular contractions)        6. Essential hypertension            Medical Decision Making: Today's Assessment/Status/Plan:   Assessment  S/P redo AVR 2/21/2013 23 mm composite pericardial tissue valve TAA repair 28 mm Valsalva sleeve graft for degenerated homograft AVR with severe aortic insufficiency, [facility].     AVR, homograft, 2000, critical aortic stenosis, [place], [place]  CRT-P 12/29/2014.   Nonischemic cardiomyopathy, recovered post CRT-P  S/P MitraClip 4/2019.  Myxomatous mitral valve disease.  Hypertension.     Syncope, 2014  Palpitations, chronic, due to PVCs now controlled.   PVCs, chronic  History of LBBB.    Recommendation/Discussion  The patient's current cardiac status is stable regarding has valvular disease, hypertension and CRT function  I reviewed the most recent echocardiogram images and results with the patient.  Medications reviewed, continue current cardiac therapy.  RTC 9 months.

## 2023-08-22 ENCOUNTER — APPOINTMENT (RX ONLY)
Dept: URBAN - METROPOLITAN AREA CLINIC 22 | Facility: CLINIC | Age: 83
Setting detail: DERMATOLOGY
End: 2023-08-22

## 2023-08-22 DIAGNOSIS — L82.1 OTHER SEBORRHEIC KERATOSIS: ICD-10-CM

## 2023-08-22 DIAGNOSIS — Z85.828 PERSONAL HISTORY OF OTHER MALIGNANT NEOPLASM OF SKIN: ICD-10-CM

## 2023-08-22 DIAGNOSIS — L81.4 OTHER MELANIN HYPERPIGMENTATION: ICD-10-CM

## 2023-08-22 PROCEDURE — ? SUNSCREEN RECOMMENDATIONS

## 2023-08-22 PROCEDURE — ? COUNSELING

## 2023-08-22 PROCEDURE — 99213 OFFICE O/P EST LOW 20 MIN: CPT

## 2023-08-22 PROCEDURE — ? DIAGNOSIS COMMENT

## 2023-08-22 PROCEDURE — ? ADDITIONAL NOTES

## 2023-08-22 ASSESSMENT — LOCATION DETAILED DESCRIPTION DERM
LOCATION DETAILED: RIGHT CENTRAL MALAR CHEEK
LOCATION DETAILED: RIGHT POSTERIOR SHOULDER
LOCATION DETAILED: RIGHT UPPER CUTANEOUS LIP

## 2023-08-22 ASSESSMENT — LOCATION SIMPLE DESCRIPTION DERM
LOCATION SIMPLE: RIGHT CHEEK
LOCATION SIMPLE: RIGHT SHOULDER
LOCATION SIMPLE: RIGHT LIP

## 2023-08-22 ASSESSMENT — LOCATION ZONE DERM
LOCATION ZONE: LIP
LOCATION ZONE: FACE
LOCATION ZONE: ARM

## 2023-08-22 NOTE — PROCEDURE: ADDITIONAL NOTES
Render Risk Assessment In Note?: no
Detail Level: Simple
Additional Notes: Pt declined MOHS, no sign of reoccurrence today. Will continue to monitor.
Additional Notes: Biopsy proven.
Additional Notes: Patient agrees to routine skin exams to continue to monitor
Cimzia Pregnancy And Lactation Text: This medication crosses the placenta but can be considered safe in certain situations. Cimzia may be excreted in breast milk.

## 2023-08-23 ENCOUNTER — NON-PROVIDER VISIT (OUTPATIENT)
Dept: CARDIOLOGY | Facility: MEDICAL CENTER | Age: 83
End: 2023-08-23
Payer: MEDICARE

## 2023-08-23 PROCEDURE — 93294 REM INTERROG EVL PM/LDLS PM: CPT | Performed by: INTERNAL MEDICINE

## 2023-08-23 NOTE — CARDIAC REMOTE MONITOR - SCAN
Device transmission reviewed. Device demonstrated appropriate function.       Electronically Signed by: Zan Hernandes M.D.    8/23/2023  8:52 AM

## 2023-10-03 ENCOUNTER — OFFICE VISIT (OUTPATIENT)
Dept: MEDICAL GROUP | Facility: PHYSICIAN GROUP | Age: 83
End: 2023-10-03
Payer: MEDICARE

## 2023-10-03 VITALS
RESPIRATION RATE: 20 BRPM | OXYGEN SATURATION: 97 % | SYSTOLIC BLOOD PRESSURE: 124 MMHG | HEIGHT: 74 IN | WEIGHT: 198.5 LBS | BODY MASS INDEX: 25.48 KG/M2 | TEMPERATURE: 97.1 F | HEART RATE: 72 BPM | DIASTOLIC BLOOD PRESSURE: 70 MMHG

## 2023-10-03 DIAGNOSIS — E55.9 VITAMIN D DEFICIENCY: ICD-10-CM

## 2023-10-03 DIAGNOSIS — Z85.46 H/O PROSTATE CANCER: Chronic | ICD-10-CM

## 2023-10-03 DIAGNOSIS — I10 ESSENTIAL HYPERTENSION: Chronic | ICD-10-CM

## 2023-10-03 DIAGNOSIS — Z95.0 BIVENTRICULAR CARDIAC PACEMAKER IN SITU: Chronic | ICD-10-CM

## 2023-10-03 DIAGNOSIS — D69.6 THROMBOCYTOPENIA (HCC): Chronic | ICD-10-CM

## 2023-10-03 DIAGNOSIS — K40.20 BILATERAL INGUINAL HERNIA WITHOUT OBSTRUCTION OR GANGRENE, RECURRENCE NOT SPECIFIED: ICD-10-CM

## 2023-10-03 DIAGNOSIS — R00.2 PALPITATIONS: ICD-10-CM

## 2023-10-03 DIAGNOSIS — R19.09 GROIN LUMP: ICD-10-CM

## 2023-10-03 DIAGNOSIS — N18.31 STAGE 3A CHRONIC KIDNEY DISEASE: Chronic | ICD-10-CM

## 2023-10-03 DIAGNOSIS — K21.9 GASTROESOPHAGEAL REFLUX DISEASE WITHOUT ESOPHAGITIS: Chronic | ICD-10-CM

## 2023-10-03 DIAGNOSIS — R73.03 PREDIABETES: Chronic | ICD-10-CM

## 2023-10-03 PROCEDURE — 99215 OFFICE O/P EST HI 40 MIN: CPT | Performed by: INTERNAL MEDICINE

## 2023-10-03 PROCEDURE — 3078F DIAST BP <80 MM HG: CPT | Performed by: INTERNAL MEDICINE

## 2023-10-03 PROCEDURE — 93000 ELECTROCARDIOGRAM COMPLETE: CPT | Performed by: INTERNAL MEDICINE

## 2023-10-03 PROCEDURE — 3074F SYST BP LT 130 MM HG: CPT | Performed by: INTERNAL MEDICINE

## 2023-10-03 ASSESSMENT — FIBROSIS 4 INDEX: FIB4 SCORE: 5

## 2023-10-03 NOTE — PROGRESS NOTES
PRIMARY CARE CLINIC VISIT        Chief Complaint   Patient presents with    Follow-Up      Palpitations  Groin lumps  Vitamin D deficiency  CKD 3  Pacemaker  Acid reflux  Follow-up hypertension  Thrombocytopenia  History of prostate cancer  Prediabetes        History of Present Illness     Palpitations  This is a new condition.  Patient reported recurrent palpitation noted since last couple weeks.  Patient denies chest pain shortness of breath or near syncope.    Currently the patient asymptomatic    Biventricular cardiac pacemaker in situ  Chronic condition.  The pacemaker is being followed by cardiology service on regular basis.  Patient denies chest pain shortness of breath lightheadedness or near syncope.    H/O prostate cancer  Chronic condition.  The patient is status post prostatectomy 2003.  The patient declined further PSA testing or urology referral.    Stage 3a chronic kidney disease (HCC)  This is a chronic condition.  Previous GFR in the 50s.  Patient asymptomatic.  Lab test ordered for follow-up.  Advised the patient to avoid NSAIDs    GERD (gastroesophageal reflux disease)  This is a chronic condition.  Patient is    Prediabetes  Chronic condition for the patient currently on diet therapy.    Essential hypertension  This is a chronic condition.  Patient is taking losartan 50 mg daily and metoprolol 25 mg daily.  Patient tolerating medication well.    Thrombocytopenia (HCC)  Chronic condition.  Previous platelet level in the low 100s.  No history of bleeding reported      Groin lump  This is a new condition noted since last several months.  Patient denies significant pain or discomfort.  No recent trauma or injury.    Vitamin D deficiency  Chronic condition.  The patient is taking vitamin D supplementation.  Lab test requested for follow-up    Current Outpatient Medications on File Prior to Visit   Medication Sig Dispense Refill    omeprazole (PRILOSEC) 20 MG delayed-release capsule TAKE 1 CAPSULE BY  MOUTH  DAILY 90 Capsule 3    metoprolol SR (TOPROL XL) 25 MG TABLET SR 24 HR TAKE 1 TABLET BY MOUTH  DAILY 90 Tablet 3    losartan (COZAAR) 50 MG Tab TAKE 1 TABLET BY MOUTH  DAILY 90 Tablet 3    Cholecalciferol (VITAMIN D) 400 UNIT TABS Take 1 Tablet by mouth every day.      DiphenhydrAMINE HCl (BENADRYL ALLERGY PO) Take 1 Each by mouth at bedtime as needed. As needed for sleep       aspirin 81 MG tablet Take 1 Tablet by mouth every evening.       No current facility-administered medications on file prior to visit.        Allergies: Patient has no known allergies.    Current Outpatient Medications Ordered in Epic   Medication Sig Dispense Refill    omeprazole (PRILOSEC) 20 MG delayed-release capsule TAKE 1 CAPSULE BY MOUTH  DAILY 90 Capsule 3    metoprolol SR (TOPROL XL) 25 MG TABLET SR 24 HR TAKE 1 TABLET BY MOUTH  DAILY 90 Tablet 3    losartan (COZAAR) 50 MG Tab TAKE 1 TABLET BY MOUTH  DAILY 90 Tablet 3    Cholecalciferol (VITAMIN D) 400 UNIT TABS Take 1 Tablet by mouth every day.      DiphenhydrAMINE HCl (BENADRYL ALLERGY PO) Take 1 Each by mouth at bedtime as needed. As needed for sleep       aspirin 81 MG tablet Take 1 Tablet by mouth every evening.       No current Epic-ordered facility-administered medications on file.       Past Medical History:   Diagnosis Date    Arthritis     hands    Basal cell carcinoma of lip 11/27/2013    Biventricular cardiac pacemaker in situ 3/4/2015    Breath shortness     with exertion    Cancer (HCC)     prostate    GERD (gastroesophageal reflux disease) 10/17/2013    Heart burn     Hypertension      Indigestion     Insomnia 10/17/2013    LBBB (left bundle branch block)      MEDICAL HOME     Pacemaker     Prostate cancer (HCC) 2003    PVCs (premature ventricular contractions) 3/4/2015    S/P AVR (aortic valve replacement) 2/21/2013    redo; [place], [place]    S/P prostatectomy 2003    S/P thoracic aortic aneurysm repair 2/21/2013    Seasonal  "allergies 10/17/2013    Valvular heart disease        Past Surgical History:   Procedure Laterality Date    TRANSCATHETER MITRAL VALVE REPAIR Right 2019    Procedure: REPAIR, MITRAL VALVE, TRANSCATHETER- AND ANY ASSOCIATED PROCEDURES INCLUDING ARTAIL SEPTAL DEFECT CLOSURE AND PERMANENT PACE MAKER PLACEMENT;  Surgeon: Neto Ramirez M.D.;  Location: SURGERY [facility];  Service: Cardiac    TRAVIS  2019    Procedure: ECHOCARDIOGRAM, TRANSESOPHAGEAL;  Surgeon: Neto Ramirez M.D.;  Location: SURGERY [facility];  Service: Cardiac    RECOVERY  2014    Performed by Cath-Recovery Surgery at SURGERY SAME DAY [facility]    AORTIC VALVE REPLACEMENT  13    Bovine tissur valve with ascending aortic repair..  Done in [place].    [facility] CARDIAC CATH  2013    normal coronaries prior to AVR.    OTHER      carcinoma removed from lip    PROSTATECTOMY, RADIAL  .    prostate cancer.    APPENDECTOMY      MITRAL VALVE REPLACE      S/P lisa Clip X1    OTHER CARDIAC SURGERY   and     aVR. Thoracic aortic aneurysm repair.       Family History   Problem Relation Age of Onset    Other Mother          at age 82. Parkinson's disease.    Cancer Father          at age 68. Prostate cancer.    Cancer Sister         Lung    Heart Disease Neg Hx        Social History     Tobacco Use   Smoking Status Never   Smokeless Tobacco Never       Social History     Substance and Sexual Activity   Alcohol Use Yes    Alcohol/week: 0.6 oz    Types: 1 Glasses of wine per week    Comment: 1/day       Review of systems.  As per HPI above. All other systems reviewed and negative.      Past Medical, Social, and Family history reviewed and updated in EPIC     Objective     /70   Pulse 72   Temp 36.2 °C (97.1 °F) (Temporal)   Resp 20   Ht 1.88 m (6' 2\")   Wt 90 kg (198 lb 8 oz)   SpO2 97%    Body mass index is 25.49 kg/m².    General: alert in no apparent distress.  Cardiovascular: regular rate and " rhythm  Pulmonary: lungs : no wheezing   Gastrointestinal: BS present. No obvious mass noted   no urethral discharge.  The patient was noted with bilateral inguinal hernia on exam.  Reducible      Lab Results   Component Value Date/Time    HBA1C 5.8 (H) 05/24/2023 08:17 AM    HBA1C 5.8 (H) 06/22/2022 09:23 AM    HBA1C 5.8 (H) 09/02/2021 10:00 AM       Lab Results   Component Value Date/Time    WBC 4.5 (L) 05/24/2023 08:17 AM    HEMOGLOBIN 14.1 05/24/2023 08:17 AM    HEMATOCRIT 42.1 05/24/2023 08:17 AM    MCV 90.0 05/24/2023 08:17 AM    PLATELETCT 101 (L) 05/24/2023 08:17 AM         Lab Results   Component Value Date/Time    SODIUM 142 05/24/2023 08:17 AM    POTASSIUM 4.0 05/24/2023 08:17 AM    GLUCOSE 97 05/24/2023 08:17 AM    BUN 21 05/24/2023 08:17 AM    CREATININE 1.12 05/24/2023 08:17 AM       Lab Results   Component Value Date/Time    CHOLSTRLTOT 162 05/24/2023 08:17 AM    TRIGLYCERIDE 135 05/24/2023 08:17 AM    HDL 48 05/24/2023 08:17 AM    LDL 87 05/24/2023 08:17 AM       Lab Results   Component Value Date/Time    ALTSGPT 10 08/12/2022 02:04 PM       I independently interpreted the patient's ekg:    Atrial sensed ventricular paced rhythm.  No acute injury pattern noted.  Result discussed with the patient.  Advised the patient to follow-up with cardiology service.    Assessment and Plan     1. Palpitations  This is a new condition.  Cause unclear needing further investigation.  Symptoms noted since the last few weeks.  Patient reported that he has been under significant stress/anxiety taking care of his wife with multiple medical issues.  Today I discussed with the patient about starting an antianxiety medication.  The patient however declined.  Currently pt asympt  - EKG  - Basic Metabolic Panel; Future  - CBC WITHOUT DIFFERENTIAL; Future  - TSH; Future  I also recommended patient to schedule an appointment to follow-up with his cardiologist for follow-up.  Advised pt to go to ER if sx recur    2. Groin  lump  3. Bilateral inguinal hernia without obstruction or gangrene, recurrence not specified  This is a new condition.  Examination today consistent with bilateral inguinal hernia.    - Referral to General Surgery  Recommend the patient to avoid heavy lifting.  Advised the patient to go to ER if pain or discomfort noted.    4. Vitamin D deficiency  - VITAMIN D,25 HYDROXY (DEFICIENCY); Future  Chronic condition.  Current status unclear.  Lab test ordered for follow-up.    5. Stage 3a chronic kidney disease (HCC)  Chronic condition.  Current status unclear.  Lab test ordered for follow-up.  Advised the patient to avoid NSAID.    6. Biventricular cardiac pacemaker in situ  This is a chronic condition.  Stable.  Patient to follow-up with cardiology service.    7. Gastroesophageal reflux disease without esophagitis  Chronic stable condition  Continue with omeprazole 20 Mg daily    8. Essential hypertension  Chronic stable condition.  Continue losartan 50 mg daily and metoprolol 25 mg daily  Continue to monitor blood pressure regular basis.    9. Thrombocytopenia (HCC)  Chronic condition.  Current status unclear.  Lab test ordered for follow-up.  Patient denies any history of bleeding.  Continue to monitor    10. H/O prostate cancer  Chronic condition.  As above the patient declined further PSA testing or urology referral.    11. Prediabetes  Chronic condition.  Current status unclear.  Lab test requested for follow-up including A1c.  Recommended patient to continue with low sweet low-carb diet.  Continue to monitor    Attestation: I spent:   48  min -  That includes time for chart review before the visit, the actual patient visit, and time spent on documentation in EMR after the visit.  Chart review/prep, review of other providers' records, imaging/lab review, face-to-face time for history/examination, pt's counseling/education, ordering, prescribing,  review of results/meds/ treatment plan with patient, and care  coordination.    The patient is of extensive complexity. This pt is at high risk for complications and morbidity.                     Please note that this dictation was created using voice recognition software. I have made every reasonable attempt to correct obvious errors, but I expect that there are errors of grammar and possibly content that I did not discover before finalizing the note.    Rao Quezada MD  Internal Medicine  [facility]

## 2023-10-03 NOTE — ASSESSMENT & PLAN NOTE
This is a new condition noted since last several months.  Patient denies significant pain or discomfort.  No recent trauma or injury.

## 2023-10-03 NOTE — ASSESSMENT & PLAN NOTE
This is a chronic condition.  Patient is taking losartan 50 mg daily and metoprolol 25 mg daily.  Patient tolerating medication well.

## 2023-10-03 NOTE — ASSESSMENT & PLAN NOTE
Chronic condition.  The patient is taking vitamin D supplementation.  Lab test requested for follow-up

## 2023-10-03 NOTE — ASSESSMENT & PLAN NOTE
Chronic condition.  The pacemaker is being followed by cardiology service on regular basis.  Patient denies chest pain shortness of breath lightheadedness or near syncope.

## 2023-10-03 NOTE — ASSESSMENT & PLAN NOTE
Chronic condition.  The patient is status post prostatectomy 2003.  The patient declined further PSA testing or urology referral.

## 2023-10-03 NOTE — ASSESSMENT & PLAN NOTE
This is a new condition.  Patient reported recurrent palpitation noted since last couple weeks.  Patient denies chest pain shortness of breath or near syncope.    Currently the patient asymptomatic

## 2023-10-03 NOTE — ASSESSMENT & PLAN NOTE
This is a chronic condition.  Previous GFR in the 50s.  Patient asymptomatic.  Lab test ordered for follow-up.  Advised the patient to avoid NSAIDs

## 2023-10-09 ENCOUNTER — HOSPITAL ENCOUNTER (OUTPATIENT)
Dept: LAB | Facility: MEDICAL CENTER | Age: 83
End: 2023-10-09
Attending: INTERNAL MEDICINE
Payer: MEDICARE

## 2023-10-09 DIAGNOSIS — D69.6 THROMBOCYTOPENIA (HCC): Chronic | ICD-10-CM

## 2023-10-09 DIAGNOSIS — E55.9 VITAMIN D DEFICIENCY: ICD-10-CM

## 2023-10-09 DIAGNOSIS — R00.2 PALPITATIONS: ICD-10-CM

## 2023-10-09 LAB
25(OH)D3 SERPL-MCNC: 65 NG/ML (ref 30–100)
ANION GAP SERPL CALC-SCNC: 9 MMOL/L (ref 7–16)
BUN SERPL-MCNC: 22 MG/DL (ref 8–22)
CALCIUM SERPL-MCNC: 9.3 MG/DL (ref 8.5–10.5)
CHLORIDE SERPL-SCNC: 109 MMOL/L (ref 96–112)
CO2 SERPL-SCNC: 23 MMOL/L (ref 20–33)
CREAT SERPL-MCNC: 1.17 MG/DL (ref 0.5–1.4)
ERYTHROCYTE [DISTWIDTH] IN BLOOD BY AUTOMATED COUNT: 47.8 FL (ref 35.9–50)
FASTING STATUS PATIENT QL REPORTED: NORMAL
GFR SERPLBLD CREATININE-BSD FMLA CKD-EPI: 61 ML/MIN/1.73 M 2
GLUCOSE SERPL-MCNC: 103 MG/DL (ref 65–99)
HCT VFR BLD AUTO: 41.5 % (ref 42–52)
HGB BLD-MCNC: 13.6 G/DL (ref 14–18)
MCH RBC QN AUTO: 29.9 PG (ref 27–33)
MCHC RBC AUTO-ENTMCNC: 32.8 G/DL (ref 32.3–36.5)
MCV RBC AUTO: 91.2 FL (ref 81.4–97.8)
PLATELET # BLD AUTO: 97 K/UL (ref 164–446)
PLATELETS.RETICULATED NFR BLD AUTO: 14.4 % (ref 0.6–13.1)
PMV BLD AUTO: 12.9 FL (ref 9–12.9)
POTASSIUM SERPL-SCNC: 4.5 MMOL/L (ref 3.6–5.5)
RBC # BLD AUTO: 4.55 M/UL (ref 4.7–6.1)
SODIUM SERPL-SCNC: 141 MMOL/L (ref 135–145)
TSH SERPL DL<=0.005 MIU/L-ACNC: 2.7 UIU/ML (ref 0.38–5.33)
WBC # BLD AUTO: 4.7 K/UL (ref 4.8–10.8)

## 2023-10-09 PROCEDURE — 84443 ASSAY THYROID STIM HORMONE: CPT

## 2023-10-09 PROCEDURE — 85055 RETICULATED PLATELET ASSAY: CPT

## 2023-10-09 PROCEDURE — 85027 COMPLETE CBC AUTOMATED: CPT

## 2023-10-09 PROCEDURE — 36415 COLL VENOUS BLD VENIPUNCTURE: CPT

## 2023-10-09 PROCEDURE — 82306 VITAMIN D 25 HYDROXY: CPT

## 2023-10-09 PROCEDURE — 80048 BASIC METABOLIC PNL TOTAL CA: CPT

## 2023-10-09 RX ORDER — LOSARTAN POTASSIUM 50 MG/1
TABLET ORAL
Qty: 90 TABLET | Refills: 3 | Status: SHIPPED | OUTPATIENT
Start: 2023-10-09

## 2023-10-18 ENCOUNTER — PRE-ADMISSION TESTING (OUTPATIENT)
Dept: ADMISSIONS | Facility: MEDICAL CENTER | Age: 83
End: 2023-10-18
Attending: COLON & RECTAL SURGERY
Payer: MEDICARE

## 2023-10-19 ENCOUNTER — ANESTHESIA EVENT (OUTPATIENT)
Dept: SURGERY | Facility: MEDICAL CENTER | Age: 83
End: 2023-10-19
Payer: MEDICARE

## 2023-10-20 ENCOUNTER — ANESTHESIA (OUTPATIENT)
Dept: SURGERY | Facility: MEDICAL CENTER | Age: 83
End: 2023-10-20
Payer: MEDICARE

## 2023-10-20 ENCOUNTER — HOSPITAL ENCOUNTER (OUTPATIENT)
Facility: MEDICAL CENTER | Age: 83
End: 2023-10-20
Attending: COLON & RECTAL SURGERY | Admitting: COLON & RECTAL SURGERY
Payer: MEDICARE

## 2023-10-20 VITALS
RESPIRATION RATE: 16 BRPM | DIASTOLIC BLOOD PRESSURE: 78 MMHG | BODY MASS INDEX: 25.44 KG/M2 | WEIGHT: 198.19 LBS | HEIGHT: 74 IN | HEART RATE: 75 BPM | OXYGEN SATURATION: 91 % | TEMPERATURE: 97.9 F | SYSTOLIC BLOOD PRESSURE: 147 MMHG

## 2023-10-20 DIAGNOSIS — Z95.2 S/P AVR (AORTIC VALVE REPLACEMENT): Chronic | ICD-10-CM

## 2023-10-20 DIAGNOSIS — K40.20 BILATERAL INGUINAL HERNIA WITHOUT OBSTRUCTION OR GANGRENE, RECURRENCE NOT SPECIFIED: ICD-10-CM

## 2023-10-20 DIAGNOSIS — G89.18 POST-OP PAIN: ICD-10-CM

## 2023-10-20 PROCEDURE — C1781 MESH (IMPLANTABLE): HCPCS | Performed by: COLON & RECTAL SURGERY

## 2023-10-20 PROCEDURE — 160048 HCHG OR STATISTICAL LEVEL 1-5: Performed by: COLON & RECTAL SURGERY

## 2023-10-20 PROCEDURE — 700111 HCHG RX REV CODE 636 W/ 250 OVERRIDE (IP): Mod: JZ | Performed by: COLON & RECTAL SURGERY

## 2023-10-20 PROCEDURE — 160002 HCHG RECOVERY MINUTES (STAT): Performed by: COLON & RECTAL SURGERY

## 2023-10-20 PROCEDURE — 700101 HCHG RX REV CODE 250: Performed by: COLON & RECTAL SURGERY

## 2023-10-20 PROCEDURE — 160036 HCHG PACU - EA ADDL 30 MINS PHASE I: Performed by: COLON & RECTAL SURGERY

## 2023-10-20 PROCEDURE — 700102 HCHG RX REV CODE 250 W/ 637 OVERRIDE(OP): Performed by: ANESTHESIOLOGY

## 2023-10-20 PROCEDURE — 700111 HCHG RX REV CODE 636 W/ 250 OVERRIDE (IP): Mod: JZ | Performed by: ANESTHESIOLOGY

## 2023-10-20 PROCEDURE — 160029 HCHG SURGERY MINUTES - 1ST 30 MINS LEVEL 4: Performed by: COLON & RECTAL SURGERY

## 2023-10-20 PROCEDURE — A9270 NON-COVERED ITEM OR SERVICE: HCPCS | Performed by: ANESTHESIOLOGY

## 2023-10-20 PROCEDURE — 160035 HCHG PACU - 1ST 60 MINS PHASE I: Performed by: COLON & RECTAL SURGERY

## 2023-10-20 PROCEDURE — 160041 HCHG SURGERY MINUTES - EA ADDL 1 MIN LEVEL 4: Performed by: COLON & RECTAL SURGERY

## 2023-10-20 PROCEDURE — 700105 HCHG RX REV CODE 258: Performed by: COLON & RECTAL SURGERY

## 2023-10-20 PROCEDURE — 700111 HCHG RX REV CODE 636 W/ 250 OVERRIDE (IP): Performed by: ANESTHESIOLOGY

## 2023-10-20 PROCEDURE — 700101 HCHG RX REV CODE 250: Performed by: ANESTHESIOLOGY

## 2023-10-20 PROCEDURE — 160025 RECOVERY II MINUTES (STATS): Performed by: COLON & RECTAL SURGERY

## 2023-10-20 PROCEDURE — C1713 ANCHOR/SCREW BN/BN,TIS/BN: HCPCS | Performed by: COLON & RECTAL SURGERY

## 2023-10-20 PROCEDURE — 160046 HCHG PACU - 1ST 60 MINS PHASE II: Performed by: COLON & RECTAL SURGERY

## 2023-10-20 PROCEDURE — 160009 HCHG ANES TIME/MIN: Performed by: COLON & RECTAL SURGERY

## 2023-10-20 DEVICE — MESH 3D MAX RIGHT 10.8 X 16CM - MED(1/CA): Type: IMPLANTABLE DEVICE | Status: FUNCTIONAL

## 2023-10-20 DEVICE — MESH 3D MAX LEFT 8.5 X 13.7CM - MEDIUM (1EA/CA): Type: IMPLANTABLE DEVICE | Status: FUNCTIONAL

## 2023-10-20 RX ORDER — SODIUM CHLORIDE, SODIUM LACTATE, POTASSIUM CHLORIDE, CALCIUM CHLORIDE 600; 310; 30; 20 MG/100ML; MG/100ML; MG/100ML; MG/100ML
INJECTION, SOLUTION INTRAVENOUS CONTINUOUS
Status: DISCONTINUED | OUTPATIENT
Start: 2023-10-20 | End: 2023-10-20 | Stop reason: HOSPADM

## 2023-10-20 RX ORDER — ONDANSETRON 2 MG/ML
4 INJECTION INTRAMUSCULAR; INTRAVENOUS
Status: DISCONTINUED | OUTPATIENT
Start: 2023-10-20 | End: 2023-10-20 | Stop reason: HOSPADM

## 2023-10-20 RX ORDER — POLYETHYLENE GLYCOL 3350 17 G/17G
17 POWDER, FOR SOLUTION ORAL DAILY
Qty: 5 EACH | Refills: 0 | Status: SHIPPED | OUTPATIENT
Start: 2023-10-20 | End: 2023-10-25

## 2023-10-20 RX ORDER — CEFAZOLIN SODIUM 1 G/3ML
INJECTION, POWDER, FOR SOLUTION INTRAMUSCULAR; INTRAVENOUS PRN
Status: DISCONTINUED | OUTPATIENT
Start: 2023-10-20 | End: 2023-10-20 | Stop reason: SURG

## 2023-10-20 RX ORDER — BUPIVACAINE HYDROCHLORIDE AND EPINEPHRINE 5; 5 MG/ML; UG/ML
INJECTION, SOLUTION EPIDURAL; INTRACAUDAL; PERINEURAL
Status: DISCONTINUED | OUTPATIENT
Start: 2023-10-20 | End: 2023-10-20 | Stop reason: HOSPADM

## 2023-10-20 RX ORDER — DIPHENHYDRAMINE HYDROCHLORIDE 50 MG/ML
12.5 INJECTION INTRAMUSCULAR; INTRAVENOUS
Status: DISCONTINUED | OUTPATIENT
Start: 2023-10-20 | End: 2023-10-20 | Stop reason: HOSPADM

## 2023-10-20 RX ORDER — ROCURONIUM BROMIDE 10 MG/ML
INJECTION, SOLUTION INTRAVENOUS PRN
Status: DISCONTINUED | OUTPATIENT
Start: 2023-10-20 | End: 2023-10-20 | Stop reason: SURG

## 2023-10-20 RX ORDER — OXYCODONE HYDROCHLORIDE 5 MG/1
5 TABLET ORAL EVERY 6 HOURS PRN
Qty: 20 TABLET | Refills: 0 | Status: SHIPPED | OUTPATIENT
Start: 2023-10-20 | End: 2023-10-25

## 2023-10-20 RX ORDER — HYDROMORPHONE HYDROCHLORIDE 1 MG/ML
0.2 INJECTION, SOLUTION INTRAMUSCULAR; INTRAVENOUS; SUBCUTANEOUS
Status: DISCONTINUED | OUTPATIENT
Start: 2023-10-20 | End: 2023-10-20 | Stop reason: HOSPADM

## 2023-10-20 RX ORDER — CETIRIZINE HYDROCHLORIDE 10 MG/1
10 TABLET ORAL
COMMUNITY

## 2023-10-20 RX ORDER — CEFAZOLIN SODIUM 1 G/3ML
INJECTION, POWDER, FOR SOLUTION INTRAMUSCULAR; INTRAVENOUS
Status: DISCONTINUED | OUTPATIENT
Start: 2023-10-20 | End: 2023-10-20 | Stop reason: HOSPADM

## 2023-10-20 RX ORDER — AMOXICILLIN 500 MG/1
500 CAPSULE ORAL 2 TIMES DAILY
Qty: 10 CAPSULE | Refills: 0 | Status: SHIPPED | OUTPATIENT
Start: 2023-10-20 | End: 2023-10-25

## 2023-10-20 RX ORDER — ONDANSETRON 2 MG/ML
INJECTION INTRAMUSCULAR; INTRAVENOUS PRN
Status: DISCONTINUED | OUTPATIENT
Start: 2023-10-20 | End: 2023-10-20 | Stop reason: SURG

## 2023-10-20 RX ORDER — HYDROMORPHONE HYDROCHLORIDE 1 MG/ML
0.4 INJECTION, SOLUTION INTRAMUSCULAR; INTRAVENOUS; SUBCUTANEOUS
Status: DISCONTINUED | OUTPATIENT
Start: 2023-10-20 | End: 2023-10-20 | Stop reason: HOSPADM

## 2023-10-20 RX ORDER — OXYCODONE HCL 5 MG/5 ML
10 SOLUTION, ORAL ORAL
Status: COMPLETED | OUTPATIENT
Start: 2023-10-20 | End: 2023-10-20

## 2023-10-20 RX ORDER — HYDROMORPHONE HYDROCHLORIDE 1 MG/ML
0.1 INJECTION, SOLUTION INTRAMUSCULAR; INTRAVENOUS; SUBCUTANEOUS
Status: DISCONTINUED | OUTPATIENT
Start: 2023-10-20 | End: 2023-10-20 | Stop reason: HOSPADM

## 2023-10-20 RX ORDER — OXYCODONE HCL 5 MG/5 ML
5 SOLUTION, ORAL ORAL
Status: COMPLETED | OUTPATIENT
Start: 2023-10-20 | End: 2023-10-20

## 2023-10-20 RX ADMIN — PROPOFOL 150 MG: 10 INJECTION, EMULSION INTRAVENOUS at 10:34

## 2023-10-20 RX ADMIN — OXYCODONE HYDROCHLORIDE 5 MG: 5 SOLUTION ORAL at 11:49

## 2023-10-20 RX ADMIN — FENTANYL CITRATE 25 MCG: 50 INJECTION, SOLUTION INTRAMUSCULAR; INTRAVENOUS at 11:49

## 2023-10-20 RX ADMIN — FENTANYL CITRATE 100 MCG: 50 INJECTION, SOLUTION INTRAMUSCULAR; INTRAVENOUS at 10:29

## 2023-10-20 RX ADMIN — ROCURONIUM BROMIDE 10 MG: 50 INJECTION, SOLUTION INTRAVENOUS at 11:02

## 2023-10-20 RX ADMIN — FENTANYL CITRATE 25 MCG: 50 INJECTION, SOLUTION INTRAMUSCULAR; INTRAVENOUS at 12:15

## 2023-10-20 RX ADMIN — CEFAZOLIN 2 G: 1 INJECTION, POWDER, FOR SOLUTION INTRAMUSCULAR; INTRAVENOUS at 10:37

## 2023-10-20 RX ADMIN — ONDANSETRON 8 MG: 2 INJECTION INTRAMUSCULAR; INTRAVENOUS at 11:11

## 2023-10-20 RX ADMIN — ROCURONIUM BROMIDE 50 MG: 50 INJECTION, SOLUTION INTRAVENOUS at 10:35

## 2023-10-20 RX ADMIN — SUGAMMADEX 200 MG: 100 INJECTION, SOLUTION INTRAVENOUS at 11:12

## 2023-10-20 RX ADMIN — FENTANYL CITRATE 25 MCG: 50 INJECTION, SOLUTION INTRAMUSCULAR; INTRAVENOUS at 11:58

## 2023-10-20 RX ADMIN — SODIUM CHLORIDE, POTASSIUM CHLORIDE, SODIUM LACTATE AND CALCIUM CHLORIDE: 600; 310; 30; 20 INJECTION, SOLUTION INTRAVENOUS at 10:29

## 2023-10-20 ASSESSMENT — PAIN SCALES - GENERAL: PAIN_LEVEL: 0

## 2023-10-20 ASSESSMENT — FIBROSIS 4 INDEX: FIB4 SCORE: 5.2

## 2023-10-20 ASSESSMENT — PAIN DESCRIPTION - PAIN TYPE
TYPE: SURGICAL PAIN

## 2023-10-20 NOTE — ANESTHESIA POSTPROCEDURE EVALUATION
Patient: Ross Mcneal    Procedure Summary     Date: 10/20/23 Room / Location: [place] [address] / SURGERY [facility]    Anesthesia Start: 1029 Anesthesia Stop: 1129    Procedure: LAPAROSCOPIC BILALTERAL INGUINAL HERNIA REPAIR WITH MESH (Abdomen) Diagnosis: (BILATERAL INGUINAL HERNIA)    Surgeons: Kilo Aiken M.D. Responsible Provider: Marcello Man M.D.    Anesthesia Type: general ASA Status: 3          Final Anesthesia Type: general  Last vitals  BP   Blood Pressure : (!) 159/83    Temp   36.1 °C (97 °F)    Pulse   66   Resp   16    SpO2   97 %      Anesthesia Post Evaluation    Patient location during evaluation: PACU  Patient participation: complete - patient participated  Level of consciousness: awake and alert  Pain score: 0    Airway patency: patent  Anesthetic complications: no  Cardiovascular status: hemodynamically stable  Respiratory status: acceptable  Hydration status: euvolemic    PONV: none          There were no known notable events for this encounter.     Nurse Pain Score: 0 (NPRS)

## 2023-10-20 NOTE — OR NURSING
1125: Pt arrived via gurney with anesthesia and RN. VSS. Dressings CDI x4. Orders reviewed and initiated.   1142: Family updated.   1200: Incentive spirometer provided.   1237: Family updated.   1305: Report called to [facility]. All questions answered. VSS. Dressings CDI x4. No patient distress. Alert and oriented x4. Pain medicated per MAR. Pt transported to phase II in stable condition on 1L NC with no personal belongings. Family updated.

## 2023-10-20 NOTE — ANESTHESIA PROCEDURE NOTES
Airway    Date/Time: 10/20/2023 10:36 AM    Performed by: Marcello Man M.D.  Authorized by: Marcello Man M.D.    Location:  OR  Urgency:  Elective  Indications for Airway Management:  Anesthesia      Spontaneous Ventilation: absent    Sedation Level:  Deep  Preoxygenated: Yes    Patient Position:  Sniffing  Mask Difficulty Assessment:  1 - vent by mask  Final Airway Type:  Endotracheal airway  Final Endotracheal Airway:  ETT  Cuffed: Yes    Technique Used for Successful ETT Placement:  Video laryngoscopy    Insertion Site:  Oral  Blade Type:  Glide  Laryngoscope Blade/Videolaryngoscope Blade Size:  4  ETT Size (mm):  7.5  Measured from:  Teeth  ETT to Teeth (cm):  24  Placement Verified by: auscultation and capnometry    Cormack-Lehane Classification:  Grade I - full view of glottis  Number of Attempts at Approach:  1   Easy intubation, soft bite block

## 2023-10-20 NOTE — DISCHARGE INSTRUCTIONS
HOME CARE INSTRUCTIONS    ACTIVITY: Rest and take it easy for the first 24 hours.  A responsible adult is recommended to remain with you during that time.  It is normal to feel sleepy.  We encourage you to not do anything that requires balance, judgment or coordination.    FOR 24 HOURS DO NOT:  Drive, operate machinery or run household appliances.  Drink beer or alcoholic beverages.  Make important decisions or sign legal documents.    SPECIAL INSTRUCTIONS:   Hernia Repair Discharge Instructions:    1. ACTIVITIES: Upon discharge from the hospital, the day of surgery it is requested that you do no significant physical activity and limit mental activities, as you have had sedation. The day after surgery, you may resume activities of daily living, but for 6 weeks, it is recommended that you do no strenuous activities or heavy lifting (greater than 20 pounds).     2. DRIVING: You may drive whenever you are off pain medications and are able to perform the activities needed to drive, i.e. turning, bending, twisting, etc.     3. WOUND: It is not unusual for patients to experience swelling and even bruising at the hernia repair site.     4. ICE: please use ice on the wound to decrease the swelling for the first 24 hours and then discontinue.     5. BATHING: The dressing can be removed two days after surgery. You should leave the steri-strips in place, they will fall off over 5-7 days. You may then allow the wound to get wet in a shower 2 days after surgery, but avoid submersion in water (tub bath) for at least a week.    6. PAIN MEDICATION: You will be given a prescription for pain medication at discharge. Please take these as directed. It is important to remember not to take medications on an empty stomach as this may cause nausea.     7. BOWEL FUNCTION: After hernia repair, it is not uncommon for patients to experience constipation. This is due to decreasing activity levels as well as pain medications. You may wish to use  a stool softener beginning immediately after surgery, and you may or may not need to use a laxative (Miralax, Milk of Magnesia, Senokot, etc.) as well.     8. CALL IF YOU HAVE: (1) Fevers to more than 101.00 F, (2) Unusual chest or leg pain, (3) Drainage or fluid from incision that may be foul smelling, increased tenderness or soreness at the wound or the wound edges are no longer together, redness or swelling at the incision site. Please do not hesitate to call with any other questions.     9. APPOINTMENT: Contact our office at [phone] for a follow-up appointment in 1 to 2 weeks following your procedure.     If you have any additional questions, please do not hesitate to call the office and speak to either a medical assistant or the provider on call.     Office address:  [address] [place] [address], [address], [place] [address]    Kilo Aiken M.D.  [facility]  [phone]    DIET: To avoid nausea, slowly advance diet as tolerated, avoiding spicy or greasy foods for the first day.  Add more substantial food to your diet according to your physician's instructions.  Babies can be fed formula or breast milk as soon as they are hungry.  INCREASE FLUIDS AND FIBER TO AVOID CONSTIPATION.    SURGICAL DRESSING/BATHING: Bandaids can be removed in 24 hours    MEDICATIONS: Resume taking daily medication.  Take prescribed pain medication with food.  If no medication is prescribed, you may take non-aspirin pain medication if needed.  PAIN MEDICATION CAN BE VERY CONSTIPATING.  Take a stool softener or laxative such as senokot, pericolace, or milk of magnesia if needed.    Prescription given for aspirin, amoxil, miralax, and roxicodone.  Last pain medication given - 5mg of Roxicodone at 11:49am.    A follow-up appointment should be arranged with your doctor in 1-2 weeks; call to schedule.    You should CALL YOUR PHYSICIAN if you develop:  Fever greater than 101 degrees F.  Pain not relieved by medication, or persistent nausea  or vomiting.  Excessive bleeding (blood soaking through dressing) or unexpected drainage from the wound.  Extreme redness or swelling around the incision site, drainage of pus or foul smelling drainage.  Inability to urinate or empty your bladder within 8 hours.  Problems with breathing or chest pain.    You should call 911 if you develop problems with breathing or chest pain.  If you are unable to contact your doctor or surgical center, you should go to the nearest emergency room or urgent care center.  Physician's telephone #: [phone]    MILD FLU-LIKE SYMPTOMS ARE NORMAL.  YOU MAY EXPERIENCE GENERALIZED MUSCLE ACHES, THROAT IRRITATION, HEADACHE AND/OR SOME NAUSEA.    If any questions arise, call your doctor.  If your doctor is not available, please feel free to call the Surgical Center at [phone].  The Center is open Monday through Friday from 7AM to 7PM.      A registered nurse may call you a few days after your surgery to see how you are doing after your procedure.    You may also receive a survey in the mail within the next two weeks and we ask that you take a few moments to complete the survey and return it to us.  Our goal is to provide you with very good care and we value your comments.     Depression / Suicide Risk    As you are discharged from this [facility] facility, it is important to learn how to keep safe from harming yourself.    Recognize the warning signs:  Abrupt changes in personality, positive or negative- including increase in energy   Giving away possessions  Change in eating patterns- significant weight changes-  positive or negative  Change in sleeping patterns- unable to sleep or sleeping all the time   Unwillingness or inability to communicate  Depression  Unusual sadness, discouragement and loneliness  Talk of wanting to die  Neglect of personal appearance   Rebelliousness- reckless behavior  Withdrawal from people/activities they love  Confusion- inability to  concentrate     If you or a loved one observes any of these behaviors or has concerns about self-harm, here's what you can do:  Talk about it- your feelings and reasons for harming yourself  Remove any means that you might use to hurt yourself (examples: pills, rope, extension cords, firearm)  Get professional help from the community (Mental Health, Substance Abuse, psychological counseling)  Do not be alone:Call your Safe Contact- someone whom you trust who will be there for you.  Call your local CRISIS HOTLINE 217-4785 or 516-550-4631  Call your local Children's Mobile Crisis Response Team [place] (327) 632-9430 or www.[employer]  Call the toll free National Suicide Prevention Hotlines   National Suicide Prevention Lifeline 408-685-TRHX (3103)  National Hope Line Network 800-SUICIDE (869-5174)    I acknowledge receipt and understanding of these Home Care instructions.

## 2023-10-20 NOTE — OP REPORT
NAME:  Ross Mcneal  MRN:  [id number]  :  1940      DATE OF OPERATION: 10/20/2023    PREOPERATIVE DIAGNOSIS: Bilateral Inguinal Hernia    POSTOPERATIVE DIAGNOSIS: Bilateral  Inguinal Hernia    OPERATION PERFORMED: Laparoscopic repair of Bilateral  Inguinal Hernia with Mesh    SURGEON: Kilo Aiken MD    ASSISTANT:  CHELLY Guajardo PA-C    ANESTHESIOLOGIST:  Anesthesiologist: Marcello Man M.D.    ANESTHESIA: General endotracheal anesthesia.      SPECIMEN: None    ESTIMATED BLOOD LOSS: < 5cc.     INDICATIONS: The patient is a 83 y.o. male with a diagnosis of Bilateral  groin bulge with Bilateral  inguinal hernia. He is taken to the operating room today for laparoscopic repair of inguinal hernia with mesh.     PROCEDURE: Following informed consent, the patient was properly identified, taken to the operating room, and placed in the supine position where general endotracheal anesthesia was administered. Intravenous antibiotics were administered by the anesthesiologist in the correct time interval. Sequential compression devices were employed. The abdomen was prepped and draped into a sterile field.     A small infraumbilical skin incision was made and dissection was carried to the left of midline.  With his midline scar, it was expected this may require an intraperitoneal approach, but the retrorectus plane was developed gently. The anterior rectus abdominus fascia sheath was exposed and incised.  The preperitoneal plane was developed bluntly.  The CovLoginRadiusen dissecting trocar balloon instrument was then introduced into the preperitoneal space.  The hand pump was then used to create the preperitoneal dissection.    The balloon trocar was then removed and the CO2 insufflation and optical trocar was then advanced.  The CO2 insufflation was started and the laparoscope was introduced. This demonstrated the dissection had created a limited left preperitoneal space but the fused layers meant we were operating in  the intraperitoneal space.    Three additional 5mm trocars were then placed to the left and right in the lower quadrants.  The peritoneum was scored and reflected downward to expose the inguinal floor bilaterally. Blunt dissectors were used to then dissect the left inguinal floor.  A moderately large sized indirect inguinal hernia was present with the hernia sac extending up through the inguinal canal.  The peritoneum was slowly reduced and gradually .  The cord lipoma was also reduced.  The vessels and structures were carefully protected and a nice inguinal floor dissection was accomplished.    The left contour mesh was soaked in Kantrex solution and was trimmed so that there was a fenestrated keyhole.  The mesh was then rolled and introduced into the preperitoneal space.  It was then maneuvered into position.  The mesh was then secured to the periosteum along Shahbaz's ligament and the pubic ramus with the absorbable tacking instrument.  It had very nice position obliterating the hernia defect without constriction of any structures.            Blunt dissectors were used to then dissect the right inguinal floor.  A moderately large sized indirect inguinal hernia was present with the hernia sac extending up through the inguinal canal.  The peritoneum was slowly reduced and gradually .  The cord lipoma was also reduced.  The vessels and structures were carefully protected and a nice inguinal floor dissection was accomplished.    The right contour mesh was soaked in Kantrex solution and was trimmed so that there was a fenestrated keyhole.  The mesh was then rolled and introduced into the preperitoneal space.  It was then maneuvered into position.  The mesh was then secured to the periosteum along Shahbaz's ligament and the pubic ramus with the absorbable tacking instrument.  It had very nice position obliterating the hernia defect without constriction of any structures.       The mesh was then held  into place with the pneumoperitoneum allowed to escape.  The port were then removed under direct vision.  The port sites were then irrigated well.  The fascia was closed with O Vicryl suture.  The port site skin incisions were closed with interrupted 4-0 Vicryl subcuticular sutures.  Steri-Strips and Benzoin were applied beneath sterile Band-Aids.     The patient tolerated the procedure well and there were no apparent complications. All sponge, needle, and instrument counts were correct on 2 separate occasions. He was awakened, extubated, and transferred to the recovery room in satisfactory condition.       ____________________________________   Kilo Aiken MD  DD: 10/20/2023  12:57 PM    CC:  [facility];

## 2023-10-20 NOTE — PROGRESS NOTES
Med rec completed per patient.  Allergies reviewed with patient.  NO Noted home antibiotics in past 30 days   Noted anticoagulants/antiplatelets in past 14 days:  - ASA 81mg QD, last dose ~ 4 days ago per patient.    Patient preferred pharmacy: [facility] for local prescriptions. [facility] for maintenance.

## 2023-10-20 NOTE — OR NURSING
1307  Pt arrived from PACU, report received from RN. VSS, Dressing Clean, dry intact to abdomen.Closed with Steristrips and bandaids                      Pt ambulated to chair with minimal SBA. Tolerating Solids, liquids. Denies pain, nausea at present. Will monitor.    1327  Discharge Instructions reviewed with patient and family. Understanding verbalized .   Adequate pain control no active nausea in post op period.     1340  IV DC with cath intact      Left via WC with belongings.

## 2023-10-20 NOTE — ANESTHESIA PREPROCEDURE EVALUATION
Case: [id number] Date/Time: 10/20/23 1015    Procedure: LAPAROSCOPIC BILALTERAL INGUINAL HERNIA REPAIR WITH MESH (Abdomen)    Anesthesia type: General    Pre-op diagnosis: BILATERAL INGUINAL HERNIA    Location: [place] OR 10 / SURGERY [facility]    Surgeons: Kilo Aiken M.D.          Relevant Problems   CARDIAC   (positive) Biventricular cardiac pacemaker in situ   (positive) Essential hypertension   (positive) LBBB (left bundle branch block)   (positive) PVCs (premature ventricular contractions)      GI   (positive) GERD (gastroesophageal reflux disease)         (positive) Stage 3a chronic kidney disease (HCC)       Physical Exam    Airway   Mallampati: II  TM distance: >3 FB  Neck ROM: full       Cardiovascular - normal exam  Rhythm: regular  Rate: normal  (-) murmur     Dental - normal exam      Very poor dentition   Pulmonary - normal exam  Breath sounds clear to auscultation     Abdominal    Neurological - normal exam         Other findings: No loose teeth per pt            Anesthesia Plan    ASA 3   ASA physical status 3 criteria: CAD/stents (> 3 months)    Plan - general       Airway plan will be ETT          Induction: intravenous    Postoperative Plan: Postoperative administration of opioids is intended.    Pertinent diagnostic labs and testing reviewed    Informed Consent:    Anesthetic plan and risks discussed with patient.    Use of blood products discussed with: patient whom consented to blood products.

## 2023-11-22 ENCOUNTER — NON-PROVIDER VISIT (OUTPATIENT)
Dept: CARDIOLOGY | Facility: MEDICAL CENTER | Age: 83
End: 2023-11-22
Payer: MEDICARE

## 2023-11-22 PROCEDURE — 93294 REM INTERROG EVL PM/LDLS PM: CPT | Performed by: INTERNAL MEDICINE

## 2023-11-22 NOTE — CARDIAC REMOTE MONITOR - SCAN
Device transmission reviewed. Device demonstrated appropriate function.       Electronically Signed by: Jam Lacy M.D.    11/28/2023  2:50 PM

## 2023-11-29 ENCOUNTER — PATIENT MESSAGE (OUTPATIENT)
Dept: HEALTH INFORMATION MANAGEMENT | Facility: OTHER | Age: 83
End: 2023-11-29

## 2023-12-11 RX ORDER — METOPROLOL SUCCINATE 25 MG/1
25 TABLET, EXTENDED RELEASE ORAL DAILY
Qty: 90 TABLET | Refills: 3 | Status: SHIPPED | OUTPATIENT
Start: 2023-12-11

## 2024-02-21 ENCOUNTER — NON-PROVIDER VISIT (OUTPATIENT)
Dept: CARDIOLOGY | Facility: MEDICAL CENTER | Age: 84
End: 2024-02-21
Payer: MEDICARE

## 2024-02-21 PROCEDURE — 93294 REM INTERROG EVL PM/LDLS PM: CPT | Performed by: INTERNAL MEDICINE

## 2024-02-21 NOTE — CARDIAC REMOTE MONITOR - SCAN
Device transmission reviewed. Device demonstrated appropriate function.       Electronically Signed by: Paul Duggan M.D.    3/16/2024  11:30 AM

## 2024-02-29 NOTE — ASSESSMENT & PLAN NOTE
Previous platelet level noted in the 110s range.  Patient denies any history of bleeding.  Patient currently taking aspirin.  Recommend repeat CBC for follow-up.   FAMILY HISTORY:  No pertinent family history in first degree relatives

## 2024-05-14 ENCOUNTER — APPOINTMENT (RX ONLY)
Dept: URBAN - METROPOLITAN AREA CLINIC 22 | Facility: CLINIC | Age: 84
Setting detail: DERMATOLOGY
End: 2024-05-14

## 2024-05-14 DIAGNOSIS — Z85.828 PERSONAL HISTORY OF OTHER MALIGNANT NEOPLASM OF SKIN: ICD-10-CM

## 2024-05-14 DIAGNOSIS — D18.0 HEMANGIOMA: ICD-10-CM

## 2024-05-14 DIAGNOSIS — L57.0 ACTINIC KERATOSIS: ICD-10-CM

## 2024-05-14 DIAGNOSIS — L82.1 OTHER SEBORRHEIC KERATOSIS: ICD-10-CM

## 2024-05-14 DIAGNOSIS — D22 MELANOCYTIC NEVI: ICD-10-CM

## 2024-05-14 DIAGNOSIS — Z71.89 OTHER SPECIFIED COUNSELING: ICD-10-CM

## 2024-05-14 DIAGNOSIS — L81.4 OTHER MELANIN HYPERPIGMENTATION: ICD-10-CM

## 2024-05-14 PROBLEM — D18.01 HEMANGIOMA OF SKIN AND SUBCUTANEOUS TISSUE: Status: ACTIVE | Noted: 2024-05-14

## 2024-05-14 PROBLEM — D22.5 MELANOCYTIC NEVI OF TRUNK: Status: ACTIVE | Noted: 2024-05-14

## 2024-05-14 PROCEDURE — 17000 DESTRUCT PREMALG LESION: CPT

## 2024-05-14 PROCEDURE — ? ADDITIONAL NOTES

## 2024-05-14 PROCEDURE — ? COUNSELING

## 2024-05-14 PROCEDURE — ? DIAGNOSIS COMMENT

## 2024-05-14 PROCEDURE — ? LIQUID NITROGEN

## 2024-05-14 PROCEDURE — 17003 DESTRUCT PREMALG LES 2-14: CPT

## 2024-05-14 PROCEDURE — 99213 OFFICE O/P EST LOW 20 MIN: CPT | Mod: 25

## 2024-05-14 PROCEDURE — ? SUNSCREEN RECOMMENDATIONS

## 2024-05-14 ASSESSMENT — LOCATION DETAILED DESCRIPTION DERM
LOCATION DETAILED: RIGHT SUPERIOR LATERAL MIDBACK
LOCATION DETAILED: LEFT INFERIOR UPPER BACK
LOCATION DETAILED: STERNUM
LOCATION DETAILED: RIGHT UPPER CUTANEOUS LIP
LOCATION DETAILED: LEFT SUPERIOR POSTERIOR HELIX
LOCATION DETAILED: RIGHT SUPERIOR POSTERIOR HELIX
LOCATION DETAILED: RIGHT MEDIAL INFERIOR CHEST
LOCATION DETAILED: LEFT PROXIMAL DORSAL FOREARM

## 2024-05-14 ASSESSMENT — LOCATION SIMPLE DESCRIPTION DERM
LOCATION SIMPLE: CHEST
LOCATION SIMPLE: RIGHT LOWER BACK
LOCATION SIMPLE: RIGHT LIP
LOCATION SIMPLE: LEFT UPPER BACK
LOCATION SIMPLE: RIGHT EAR
LOCATION SIMPLE: LEFT EAR
LOCATION SIMPLE: LEFT FOREARM

## 2024-05-14 ASSESSMENT — LOCATION ZONE DERM
LOCATION ZONE: ARM
LOCATION ZONE: LIP
LOCATION ZONE: TRUNK
LOCATION ZONE: EAR

## 2024-05-14 NOTE — PROCEDURE: ADDITIONAL NOTES
Render Risk Assessment In Note?: no
Detail Level: Simple
Additional Notes: 1 year post bx and no recurrence of BCC.

## 2024-05-14 NOTE — PROCEDURE: LIQUID NITROGEN
Show Aperture Variable?: Yes
Number Of Freeze-Thaw Cycles: 2 freeze-thaw cycles
Consent: The patient's consent was obtained including but not limited to risks of crusting, scabbing, blistering, scarring, darker or lighter pigmentary change, recurrence, incomplete removal and infection.
Render Post-Care Instructions In Note?: no
Duration Of Freeze Thaw-Cycle (Seconds): 3
Post-Care Instructions: I reviewed with the patient in detail post-care instructions. Patient is to wear sunprotection, and avoid picking at any of the treated lesions. Pt may apply Vaseline to crusted or scabbing areas.
Detail Level: Detailed

## 2024-05-22 ENCOUNTER — NON-PROVIDER VISIT (OUTPATIENT)
Dept: CARDIOLOGY | Facility: MEDICAL CENTER | Age: 84
End: 2024-05-22
Payer: MEDICARE

## 2024-05-24 DIAGNOSIS — K21.9 GASTROESOPHAGEAL REFLUX DISEASE WITHOUT ESOPHAGITIS: ICD-10-CM

## 2024-05-28 RX ORDER — OMEPRAZOLE 20 MG/1
CAPSULE, DELAYED RELEASE ORAL
Qty: 90 CAPSULE | Refills: 3 | Status: SHIPPED | OUTPATIENT
Start: 2024-05-28

## 2024-05-28 NOTE — TELEPHONE ENCOUNTER
Received request via: Pharmacy    Was the patient seen in the last year in this department? Yes    Does the patient have an active prescription (recently filled or refills available) for medication(s) requested? No        Does the patient have MCC Plus and need 100 day supply (blood pressure, diabetes and cholesterol meds only)? Patient does not have SCP

## 2024-08-12 ENCOUNTER — OFFICE VISIT (OUTPATIENT)
Dept: CARDIOLOGY | Facility: MEDICAL CENTER | Age: 84
End: 2024-08-12
Attending: INTERNAL MEDICINE
Payer: MEDICARE

## 2024-08-12 VITALS
SYSTOLIC BLOOD PRESSURE: 118 MMHG | OXYGEN SATURATION: 98 % | BODY MASS INDEX: 24.51 KG/M2 | DIASTOLIC BLOOD PRESSURE: 62 MMHG | WEIGHT: 191 LBS | HEIGHT: 74 IN | HEART RATE: 65 BPM

## 2024-08-12 DIAGNOSIS — I49.3 PVCS (PREMATURE VENTRICULAR CONTRACTIONS): ICD-10-CM

## 2024-08-12 DIAGNOSIS — Z98.890 S/P THORACIC AORTIC ANEURYSM REPAIR: Chronic | ICD-10-CM

## 2024-08-12 DIAGNOSIS — Z95.818 S/P MITRAL VALVE CLIP IMPLANTATION: Chronic | ICD-10-CM

## 2024-08-12 DIAGNOSIS — I44.7 LBBB (LEFT BUNDLE BRANCH BLOCK): ICD-10-CM

## 2024-08-12 DIAGNOSIS — Z98.890 S/P MITRAL VALVE CLIP IMPLANTATION: Chronic | ICD-10-CM

## 2024-08-12 DIAGNOSIS — Z86.79 S/P THORACIC AORTIC ANEURYSM REPAIR: Chronic | ICD-10-CM

## 2024-08-12 DIAGNOSIS — Z95.0 BIVENTRICULAR CARDIAC PACEMAKER IN SITU: Chronic | ICD-10-CM

## 2024-08-12 DIAGNOSIS — I42.8 NONISCHEMIC CARDIOMYOPATHY (HCC): ICD-10-CM

## 2024-08-12 DIAGNOSIS — Z95.2 S/P AVR (AORTIC VALVE REPLACEMENT): Chronic | ICD-10-CM

## 2024-08-12 DIAGNOSIS — I10 ESSENTIAL HYPERTENSION: Chronic | ICD-10-CM

## 2024-08-12 PROCEDURE — 3078F DIAST BP <80 MM HG: CPT | Performed by: INTERNAL MEDICINE

## 2024-08-12 PROCEDURE — 3074F SYST BP LT 130 MM HG: CPT | Performed by: INTERNAL MEDICINE

## 2024-08-12 PROCEDURE — 99214 OFFICE O/P EST MOD 30 MIN: CPT | Performed by: INTERNAL MEDICINE

## 2024-08-12 PROCEDURE — 99212 OFFICE O/P EST SF 10 MIN: CPT | Performed by: INTERNAL MEDICINE

## 2024-08-12 ASSESSMENT — ENCOUNTER SYMPTOMS
PALPITATIONS: 0
LOSS OF CONSCIOUSNESS: 0
PND: 0
ORTHOPNEA: 0
DIZZINESS: 0
COUGH: 0
MYALGIAS: 0
SHORTNESS OF BREATH: 0

## 2024-08-12 NOTE — PROGRESS NOTES
Chief Complaint   Patient presents with    Hypertension    Other     F/V Dx: S/P AVR (aortic valve replacement)       Subjective     Ross Mcneal is a 84 y.o. male who presents today for followup via care.    The patient has medical problems including nonischemic CM (recovered EF), redo AVR (2000 homograft for AS), 2013 23 mm pericardial valve with TAA repair for degenerative homograft and severe AI, LBBB, CRT-P 2014, MitraClip 2019, hypertension, chronic PVCs, normal coronary arteries.    Since 8/11/2023 appointment the patient has had no cardiac symptoms including chest pain, palpitations, shortness of breath.  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 Fortunately his wife has 2 sons that live locally that are helpful.      Past Medical History:   Diagnosis Date    Arthritis     hands    Basal cell carcinoma of lip 11/27/2013    Biventricular cardiac pacemaker in situ 03/04/2015    Breath shortness     with exertion    Cancer (HCC) 2003    prostate    GERD (gastroesophageal reflux disease) 10/17/2013    Heart burn     Hypertension      Indigestion     Insomnia 10/17/2013    LBBB (left bundle branch block)      MEDICAL HOME     Pacemaker     Prostate cancer (HCC) 2003    PVCs (premature ventricular contractions) 03/04/2015    S/P AVR (aortic valve replacement) 02/21/2013    redo; [place], [place]    S/P prostatectomy 2003    S/P thoracic aortic aneurysm repair 02/21/2013    Seasonal allergies 10/17/2013    Valvular heart disease      Past Surgical History:   Procedure Laterality Date    INGUINAL HERNIA LAPAROSCOPIC BILATERAL N/A 10/20/2023    Procedure: LAPAROSCOPIC BILALTERAL INGUINAL HERNIA REPAIR WITH MESH;  Surgeon: Kilo Aiken M.D.;  Location: SURGERY [facility];  Service: General    TRANSCATHETER MITRAL VALVE REPAIR Right 4/22/2019    Procedure: REPAIR, MITRAL VALVE, TRANSCATHETER- AND ANY ASSOCIATED PROCEDURES INCLUDING  ARTAIL SEPTAL DEFECT CLOSURE AND PERMANENT PACE MAKER PLACEMENT;  Surgeon: Neto Ramirez M.D.;  Location: SURGERY [facility];  Service: Cardiac    TRAVIS  2019    Procedure: ECHOCARDIOGRAM, TRANSESOPHAGEAL;  Surgeon: Neto Ramirez M.D.;  Location: SURGERY [facility];  Service: Cardiac    RECOVERY  2014    Performed by Cath-Recovery Surgery at SURGERY SAME DAY [facility]    AORTIC VALVE REPLACEMENT  13    Bovine tissur valve with ascending aortic repair..  Done in [place].    SIERRA CARDIAC CATH  2013    normal coronaries prior to AVR.    OTHER      carcinoma removed from lip    PROSTATECTOMY, RADIAL  .    prostate cancer.    APPENDECTOMY      MITRAL VALVE REPLACE      S/P lisa Clip X1    OTHER CARDIAC SURGERY   and     aVR. Thoracic aortic aneurysm repair.     Family History   Problem Relation Age of Onset    Other Mother          at age 82. Parkinson's disease.    Cancer Father          at age 68. Prostate cancer.    Cancer Sister         Lung    Heart Disease Neg Hx      Social History     Socioeconomic History    Marital status:      Spouse name: Not on file    Number of children: Not on file    Years of education: Not on file    Highest education level: Not on file   Occupational History    Not on file   Tobacco Use    Smoking status: Never    Smokeless tobacco: Never   Vaping Use    Vaping status: Never Used   Substance and Sexual Activity    Alcohol use: Yes     Alcohol/week: 0.6 oz     Types: 1 Glasses of wine per week     Comment: 1/day    Drug use: No    Sexual activity: Not Currently   Other Topics Concern    Not on file   Social History Narrative    Retired . . Moved to [place] in .     Social Determinants of Health     Financial Resource Strain: Not on file   Food Insecurity: Not on file   Transportation Needs: Not on file   Physical Activity: Not on file   Stress: Not on file   Social Connections: Not on file  "  Intimate Partner Violence: Not on file   Housing Stability: Not on file     No Known Allergies  Outpatient Encounter Medications as of 8/12/2024   Medication Sig Dispense Refill    omeprazole (PRILOSEC) 20 MG delayed-release capsule TAKE 1 CAPSULE BY MOUTH DAILY 90 Capsule 3    metoprolol SR (TOPROL XL) 25 MG TABLET SR 24 HR TAKE 1 TABLET BY MOUTH ONCE  DAILY 90 Tablet 3    cetirizine (ZYRTEC) 10 MG Tab Take 10 mg by mouth 1 time a day as needed for Allergies.      aspirin 81 MG tablet Take 1 Tablet by mouth every evening. 100 Tablet 0    losartan (COZAAR) 50 MG Tab TAKE 1 TABLET BY MOUTH DAILY (Patient taking differently: Take 50 mg by mouth every morning.) 90 Tablet 3    Cholecalciferol (VITAMIN D) 400 UNIT TABS Take 1 Tablet by mouth every day.      diphenhydrAMINE (BENADRYL) 25 MG capsule Take 1 Each by mouth at bedtime. As needed for sleep (Patient not taking: Reported on 8/12/2024)       No facility-administered encounter medications on file as of 8/12/2024.     Review of Systems   Respiratory:  Negative for cough and shortness of breath.    Cardiovascular:  Negative for chest pain, palpitations, orthopnea, leg swelling and PND.   Musculoskeletal:  Negative for myalgias.   Neurological:  Negative for dizziness and loss of consciousness.              Objective     /62 (BP Location: Left arm, Patient Position: Sitting, BP Cuff Size: Adult)   Pulse 65   Ht 1.88 m (6' 2\")   Wt 86.6 kg (191 lb)   SpO2 98%   BMI 24.52 kg/m²     Physical Exam  Vitals reviewed.   Constitutional:       General: He is not in acute distress.     Appearance: He is well-developed.   Eyes:      Conjunctiva/sclera: Conjunctivae normal.      Pupils: Pupils are equal, round, and reactive to light.   Neck:      Vascular: No JVD.   Cardiovascular:      Rate and Rhythm: Normal rate and regular rhythm.      Pulses:           Carotid pulses are 2+ on the right side and 2+ on the left side.       Radial pulses are 1+ on the right side " and 1+ on the left side.      Heart sounds: Murmur heard.      No friction rub. No gallop.   Pulmonary:      Effort: Pulmonary effort is normal. No accessory muscle usage or respiratory distress.      Breath sounds: Normal breath sounds. No wheezing or rales.   Abdominal:      General: There is no distension.      Palpations: Abdomen is soft. There is no mass.      Tenderness: There is no abdominal tenderness.   Musculoskeletal:      Cervical back: Normal range of motion and neck supple.      Right lower leg: No edema.      Left lower leg: No edema.   Skin:     General: Skin is warm and dry.      Findings: No rash.      Nails: There is no clubbing.   Neurological:      Mental Status: He is alert and oriented to person, place, and time.   Psychiatric:         Behavior: Behavior normal.              ECHOCARDIOGRAM 2/27/2013  EF 30-40%. Aortic valve prosthesis. 1-2+ MR. 2+ TR.     ECHOCARDIOGRAM 12/12/2014  Pt has a prosthetic aortic valve with normal motion.  Transvalvular gradients are - Peak: 20 mmHg Mean: 8 mmHg.  Myxomatous change with prolapse of the both mitral leaflet was present.  Eccentric, more than one jets of mild to moderate mitral regurgitation.  Mild to moderately dilated left atrium.  Normal left ventricular chamber size.  Mild concentric left ventricular hypertrophy.  Difficult to determine systolic function due to irregular heart rhythm   but appeared preserved.  Septal bounces probably from conduction abnormality (LBBB).  Normal right ventricular systolic function.  Unable to estimate pulmonary artery pressure due to an inadequate   tricuspid regurgitant jet.     ECHOCARDIOGRAM 11/11/2015  Bovine bioprosthetic aortic valve; normal function.  Normal left ventricular systolic function.  Left ventricular ejection fraction is visually estimated to be 70%.  Asymmetric septal hypertrophy.  Mitral valve prolapse of anterior and posterior leaflets with   myxomatous changes.  Moderate eccentric mitral  regurgitation.  Right heart pressures are normal.     ECHOCARDIOGRAM 11/1/2017   Normal left   ventricular systolic function.  Left ventricular ejection fraction is visually estimated to be 70%.  Myxomatous mitral valve leaflet thickening with prolapse of the   anterior and posterior leaflets.  Severe eccentric mitral regurgitation.  Normal function of mechanical prosthetic valve.  Right heart pressures are normal.  Pacer/ICD wire seen in right ventricle.     TRAVIS 2/12/2019  The left ventricle was normal in size and function.  Left ventricular ejection fraction is visually estimated to be 70%.  Myxomatous mitral valve with prolapse of both the anterior and   posterior leaflets.  Severe mitral regurgitation.  Known bioprosthetic aortic valve that is functioning normally.    ECHOCARDIOGRAM 05/27/2019  Prior study done 4/23/19 - compared to the report of the study done -   there has been no significant change.   Normal left ventricular systolic function.  Left ventricular ejection fraction is visually estimated to be 65%.  Pacer/ICD wire seen in right ventricle.  Known transcatheter mitral valve repair which is functioning normally   with appropriate transvalvular gradient.  Mean transvalvular gradient is 2 mmHg   Normal function of bioprosthetic valve which is working well.  Vmax is  2.40 m/s. Transvalvular gradients are - Peak: 23 mmHg, Mean:   11 mmHg.   Right ventricular systolic pressure is estimated to be 20 mmHg.    ECHOCARDIOGRAM 04/01/2020  Prior study done 5/27/19 - compared to the report of the study done - there has been no significant change.   Normal left ventricular systolic function.  Left ventricular ejection fraction is visually estimated to be 65%.  Known transcatheter mitral valve repair which is functioning normally with appropriate transvalvular gradient.  Mild mitral regurgitation.  Mean transvalvular gradient is 2 mmHg  Normal function of bioprosthetic aortic valve.  Vmax is 2.23  m/s.  Transvalvular gradients are - Peak: 20 mmHg, Mean: 11 mmHg  Right ventricular systolic pressure is estimated to be 20 mmHg.    ECHOCARDIOGRAM 7/3/2023  Normal left ventricular systolic function.  The left ventricular ejection fraction is visually estimated to be 70%.  Mild concentric left ventricular hypertrophy.  Known bioprosthetic aortic valve that is functioning normally with   normal transvalvular gradients.  Known transcatheter mitral valve repair which is functioning normally   with appropriate transvalvular gradient.  Trace to mild eccentric mitral regurgitation.  The right ventricle is normal in size and systolic function.  Compared to the prior study on 4/1/2020, there is now significant   change.      Assessment & Plan     1. S/P AVR (aortic valve replacement)        2. S/P mitral valve clip implantation        3. S/P thoracic aortic aneurysm repair        4. Biventricular cardiac pacemaker in situ        5. LBBB (left bundle branch block)        6. PVCs (premature ventricular contractions)        7. Essential hypertension        8. Nonischemic cardiomyopathy (HCC)              Medical Decision Making: Today's Assessment/Status/Plan:   Assessment  S/P redo AVR 2/21/2013 23 mm composite pericardial tissue valve TAA repair 28 mm Valsalva sleeve graft for degenerated homograft AVR with severe aortic insufficiency, [facility].     AVR, homograft, 2000, critical aortic stenosis, [place], [place]  CRT-P 12/29/2014.   Nonischemic cardiomyopathy, recovered post CRT-P  S/P MitraClip 4/2019.  Myxomatous mitral valve disease.  Hypertension.     Syncope, 2014  Palpitations, chronic, due to PVCs now controlled.   PVCs, chronic  History of LBBB.    Recommendation/Discussion  The patient's current cardiac status is stable regarding has valvular disease, hypertension and CRT function  Medications reviewed, continue current cardiac therapy with losartan,  metoprolol and aspirin.  RTC 1 year.

## 2024-08-19 RX ORDER — LOSARTAN POTASSIUM 50 MG/1
TABLET ORAL
Qty: 90 TABLET | Refills: 0 | Status: SHIPPED | OUTPATIENT
Start: 2024-08-19

## 2024-08-19 NOTE — TELEPHONE ENCOUNTER
Received request via: Pharmacy    Was the patient seen in the last year in this department? Yes    Does the patient have an active prescription (recently filled or refills available) for medication(s) requested? No    Pharmacy Name: [facility]    Does the patient have long-term Plus and need 100-day supply? (This applies to ALL medications) Patient does not have SCP

## 2024-08-21 ENCOUNTER — NON-PROVIDER VISIT (OUTPATIENT)
Dept: CARDIOLOGY | Facility: MEDICAL CENTER | Age: 84
End: 2024-08-21
Payer: MEDICARE

## 2024-09-03 NOTE — ANESTHESIA TIME REPORT
Anesthesia Start and Stop Event Times     Date Time Event    10/20/2023 1003 Ready for Procedure     1029 Anesthesia Start     1129 Anesthesia Stop        Responsible Staff  10/20/23    Name Role Begin End    Marcello Man M.D. Anesth 1012 1129        Overtime Reason:  no overtime (within assigned shift)    Comments:                                                       I was present during the key portion of the procedure.

## 2024-11-20 ENCOUNTER — NON-PROVIDER VISIT (OUTPATIENT)
Dept: CARDIOLOGY | Facility: MEDICAL CENTER | Age: 84
End: 2024-11-20
Payer: MEDICARE

## 2024-11-20 PROCEDURE — 93294 REM INTERROG EVL PM/LDLS PM: CPT | Performed by: INTERNAL MEDICINE

## 2024-11-20 NOTE — CARDIAC REMOTE MONITOR - SCAN
Device transmission reviewed. Device demonstrated appropriate function.       Electronically Signed by: Jam Lacy M.D.    11/20/2024  11:51 AM

## 2025-02-19 ENCOUNTER — NON-PROVIDER VISIT (OUTPATIENT)
Dept: CARDIOLOGY | Facility: MEDICAL CENTER | Age: 85
End: 2025-02-19
Payer: MEDICARE

## 2025-02-19 PROCEDURE — 93294 REM INTERROG EVL PM/LDLS PM: CPT | Performed by: STUDENT IN AN ORGANIZED HEALTH CARE EDUCATION/TRAINING PROGRAM

## 2025-02-19 NOTE — CARDIAC REMOTE MONITOR - SCAN
Device transmission reviewed. Device demonstrated appropriate function.       Electronically Signed by: Lynnette Elizabeth MD, PhD    2/22/2025  11:46 PM

## 2025-03-10 ENCOUNTER — OFFICE VISIT (OUTPATIENT)
Dept: MEDICAL GROUP | Facility: PHYSICIAN GROUP | Age: 85
End: 2025-03-10
Payer: MEDICARE

## 2025-03-10 VITALS
HEIGHT: 74 IN | HEART RATE: 68 BPM | TEMPERATURE: 98.3 F | SYSTOLIC BLOOD PRESSURE: 142 MMHG | DIASTOLIC BLOOD PRESSURE: 88 MMHG | RESPIRATION RATE: 16 BRPM | WEIGHT: 195.1 LBS | BODY MASS INDEX: 25.04 KG/M2 | OXYGEN SATURATION: 98 %

## 2025-03-10 DIAGNOSIS — Z98.890 S/P MITRAL VALVE CLIP IMPLANTATION: Chronic | ICD-10-CM

## 2025-03-10 DIAGNOSIS — N18.31 STAGE 3A CHRONIC KIDNEY DISEASE: Chronic | ICD-10-CM

## 2025-03-10 DIAGNOSIS — Z95.2 S/P AVR (AORTIC VALVE REPLACEMENT): Chronic | ICD-10-CM

## 2025-03-10 DIAGNOSIS — Z86.79 S/P THORACIC AORTIC ANEURYSM REPAIR: Chronic | ICD-10-CM

## 2025-03-10 DIAGNOSIS — Z98.890 S/P THORACIC AORTIC ANEURYSM REPAIR: Chronic | ICD-10-CM

## 2025-03-10 DIAGNOSIS — I42.8 NONISCHEMIC CARDIOMYOPATHY (HCC): ICD-10-CM

## 2025-03-10 DIAGNOSIS — Z95.818 S/P MITRAL VALVE CLIP IMPLANTATION: Chronic | ICD-10-CM

## 2025-03-10 DIAGNOSIS — E55.9 VITAMIN D DEFICIENCY: ICD-10-CM

## 2025-03-10 DIAGNOSIS — D69.6 THROMBOCYTOPENIA (HCC): Chronic | ICD-10-CM

## 2025-03-10 DIAGNOSIS — Z95.0 BIVENTRICULAR CARDIAC PACEMAKER IN SITU: Chronic | ICD-10-CM

## 2025-03-10 DIAGNOSIS — Z85.46 H/O PROSTATE CANCER: Chronic | ICD-10-CM

## 2025-03-10 DIAGNOSIS — I10 ESSENTIAL HYPERTENSION: Chronic | ICD-10-CM

## 2025-03-10 DIAGNOSIS — E78.5 DYSLIPIDEMIA: ICD-10-CM

## 2025-03-10 DIAGNOSIS — R73.03 PREDIABETES: Chronic | ICD-10-CM

## 2025-03-10 DIAGNOSIS — K21.9 GASTROESOPHAGEAL REFLUX DISEASE WITHOUT ESOPHAGITIS: Chronic | ICD-10-CM

## 2025-03-10 PROCEDURE — 3077F SYST BP >= 140 MM HG: CPT | Performed by: INTERNAL MEDICINE

## 2025-03-10 PROCEDURE — 99215 OFFICE O/P EST HI 40 MIN: CPT | Performed by: INTERNAL MEDICINE

## 2025-03-10 PROCEDURE — 3079F DIAST BP 80-89 MM HG: CPT | Performed by: INTERNAL MEDICINE

## 2025-03-10 RX ORDER — METOPROLOL SUCCINATE 25 MG/1
25 TABLET, EXTENDED RELEASE ORAL DAILY
Qty: 90 TABLET | Refills: 3 | Status: SHIPPED | OUTPATIENT
Start: 2025-03-10

## 2025-03-10 RX ORDER — LOSARTAN POTASSIUM 100 MG/1
100 TABLET ORAL DAILY
Qty: 100 TABLET | Refills: 3 | Status: SHIPPED | OUTPATIENT
Start: 2025-03-10 | End: 2026-04-14

## 2025-03-10 ASSESSMENT — PATIENT HEALTH QUESTIONNAIRE - PHQ9: CLINICAL INTERPRETATION OF PHQ2 SCORE: 0

## 2025-03-10 NOTE — ASSESSMENT & PLAN NOTE
This is a chronic condition.  Patient presently taking vitamin D supplementation.  Lab test ordered for follow-up

## 2025-03-10 NOTE — ASSESSMENT & PLAN NOTE
This is a chronic condition.  Patient followed by cardiology service.  Patient denies chest pain shortness of breath or palpitation.

## 2025-03-10 NOTE — ASSESSMENT & PLAN NOTE
Chronic condition.  Patient followed by cardiology service.  Patient denies chest pain shortness of breath or palpitation.

## 2025-03-10 NOTE — ASSESSMENT & PLAN NOTE
Chronic condition.  Status post prostatectomy 2003.  Patient declined further PSA testing he also declined urology referral.

## 2025-03-10 NOTE — PROGRESS NOTES
PRIMARY CARE CLINIC VISIT        Chief Complaint   Patient presents with    Follow-Up     BP    Medication Refill      Follow-up hypertension  Status post aortic valve replacement  Status post thoracic aortic aneurysm repair  Pacemaker  Mitral valve clip implantation  CKD 3  Prediabetes  Hypertension  Acid reflux  Thrombocytopenia  Nonischemic cardiomyopathy  Vitamin D deficiency  History of prostate cancer  Hyperlipidemia        History of Present Illness     S/P AVR (aortic valve replacement)  This is a chronic condition.  Patient followed by cardiology service.  Patient denies chest pain shortness of breath or palpitation.    S/P thoracic aortic aneurysm repair  Chronic condition.  Patient status post repair of thoracic aortic aneurysm.  Patient followed by cardiology service.  No new symptoms reported.    Biventricular cardiac pacemaker in situ  Chronic condition.  Patient followed by cardiology.  No new symptoms reported.    Nonischemic cardiomyopathy (HCC)  Chronic condition.  Followed by cardiology service.  Patient presently taking losartan and metoprolol.    H/O prostate cancer  Chronic condition.  Status post prostatectomy 2003.  Patient declined further PSA testing he also declined urology referral.    S/P mitral valve clip implantation  Chronic condition.  Patient followed by cardiology service.  Patient denies chest pain shortness of breath or palpitation.    Stage 3a chronic kidney disease (HCC)  Chronic condition.  Previous GFR was in the 50s.  Recommend lab test to be done for follow-up.    GERD (gastroesophageal reflux disease)  Chronic condition.  Patient currently taking omeprazole.  Patient denies nausea vomiting or dysphagia.    Prediabetes  Chronic condition.  Patient currently on diet therapy.  Patient is due for lab test.  Currently the patient asymptomatic    Essential hypertension  Chronic condition.  Patient currently taking losartan 50 mg daily and metoprolol 25 mg daily.  Blood pressure  has been running on the high side recently.  Patient asymptomatic    Thrombocytopenia (HCC)  Chronic condition.  Previous platelet level was noted to be low.  The patient denies history of bleeding.  Lab test ordered for follow-up.  Patient currently taking aspirin 81 Mg daily    Vitamin D deficiency  This is a chronic condition.  Patient presently taking vitamin D supplementation.  Lab test ordered for follow-up    Current Outpatient Medications on File Prior to Visit   Medication Sig Dispense Refill    omeprazole (PRILOSEC) 20 MG delayed-release capsule TAKE 1 CAPSULE BY MOUTH DAILY 90 Capsule 3    cetirizine (ZYRTEC) 10 MG Tab Take 10 mg by mouth 1 time a day as needed for Allergies.      aspirin 81 MG tablet Take 1 Tablet by mouth every evening. 100 Tablet 0    Cholecalciferol (VITAMIN D) 400 UNIT TABS Take 1 Tablet by mouth every day.      diphenhydrAMINE (BENADRYL) 25 MG capsule Take 1 Each by mouth at bedtime. As needed for sleep (Patient not taking: Reported on 8/12/2024)       No current facility-administered medications on file prior to visit.        Allergies: Patient has no known allergies.    Current Outpatient Medications Ordered in Epic   Medication Sig Dispense Refill    metoprolol SR (TOPROL XL) 25 MG TABLET SR 24 HR Take 1 Tablet by mouth every day. 90 Tablet 3    losartan (COZAAR) 100 MG Tab Take 1 Tablet by mouth every day. 100 Tablet 3    omeprazole (PRILOSEC) 20 MG delayed-release capsule TAKE 1 CAPSULE BY MOUTH DAILY 90 Capsule 3    cetirizine (ZYRTEC) 10 MG Tab Take 10 mg by mouth 1 time a day as needed for Allergies.      aspirin 81 MG tablet Take 1 Tablet by mouth every evening. 100 Tablet 0    Cholecalciferol (VITAMIN D) 400 UNIT TABS Take 1 Tablet by mouth every day.      diphenhydrAMINE (BENADRYL) 25 MG capsule Take 1 Each by mouth at bedtime. As needed for sleep (Patient not taking: Reported on 8/12/2024)       No current [facility]-ordered facility-administered medications on file.        Past Medical History:   Diagnosis Date    Arthritis     hands    Basal cell carcinoma of lip 11/27/2013    Biventricular cardiac pacemaker in situ 03/04/2015    Breath shortness     with exertion    Cancer (HCC) 2003    prostate    GERD (gastroesophageal reflux disease) 10/17/2013    Heart burn     Hypertension      Indigestion     Insomnia 10/17/2013    LBBB (left bundle branch block)      MEDICAL HOME     Pacemaker     Prostate cancer (HCC) 2003    PVCs (premature ventricular contractions) 03/04/2015    S/P AVR (aortic valve replacement) 02/21/2013    [facility]; [place], [place]    S/P prostatectomy 2003    S/P thoracic aortic aneurysm repair 02/21/2013    Seasonal allergies 10/17/2013    Valvular heart disease        Past Surgical History:   Procedure Laterality Date    INGUINAL HERNIA LAPAROSCOPIC BILATERAL N/A 10/20/2023    Procedure: LAPAROSCOPIC BILALTERAL INGUINAL HERNIA REPAIR WITH MESH;  Surgeon: Kilo Aiken M.D.;  Location: SURGERY [facility];  Service: General    TRANSCATHETER MITRAL VALVE REPAIR Right 4/22/2019    Procedure: REPAIR, MITRAL VALVE, TRANSCATHETER- AND ANY ASSOCIATED PROCEDURES INCLUDING ARTAIL SEPTAL DEFECT CLOSURE AND PERMANENT PACE MAKER PLACEMENT;  Surgeon: Neto Ramirez M.D.;  Location: SURGERY [facility];  Service: Cardiac    TRAVIS  4/22/2019    Procedure: ECHOCARDIOGRAM, TRANSESOPHAGEAL;  Surgeon: Neto Ramirez M.D.;  Location: SURGERY [facility];  Service: Cardiac    RECOVERY  12/29/2014    Performed by Cath-Recovery Surgery at SURGERY SAME DAY [facility]    AORTIC VALVE REPLACEMENT  2/21/13    Bovine tissur valve with ascending aortic repair..  Done in [place].    [facility] CARDIAC CATH  2/2013    normal coronaries prior to AVR.    OTHER  2013    carcinoma removed from lip    PROSTATECTOMY, RADIAL  2003.    prostate cancer.    APPENDECTOMY      MITRAL VALVE REPLACE      S/P lisa Clip X1    OTHER CARDIAC SURGERY  2000 and 2013    aVR.  "Thoracic aortic aneurysm repair.       Family History   Problem Relation Age of Onset    Other Mother          at age 82. Parkinson's disease.    Cancer Father          at age 68. Prostate cancer.    Cancer Sister         Lung    Heart Disease Neg Hx        Social History     Tobacco Use   Smoking Status Never   Smokeless Tobacco Never       Social History     Substance and Sexual Activity   Alcohol Use Yes    Alcohol/week: 0.6 oz    Types: 1 Glasses of wine per week    Comment: 1/day       Review of systems  As per HPI above. All other systems reviewed and negative.      Past Medical, Social, and Family history reviewed and updated in [facility]       LAB DATA:     I have independently reviewed / interpreted labs    Lab Results   Component Value Date/Time    HBA1C 5.8 (H) 2023 08:17 AM    HBA1C 5.8 (H) 2022 09:23 AM    HBA1C 5.8 (H) 2021 10:00 AM        Lab Results   Component Value Date/Time    WBC 4.7 (L) 10/09/2023 07:43 AM    HEMOGLOBIN 13.6 (L) 10/09/2023 07:43 AM    HEMATOCRIT 41.5 (L) 10/09/2023 07:43 AM    MCV 91.2 10/09/2023 07:43 AM    PLATELETCT 97 (L) 10/09/2023 07:43 AM       Lab Results   Component Value Date/Time    SODIUM 141 10/09/2023 07:43 AM    POTASSIUM 4.5 10/09/2023 07:43 AM    GLUCOSE 103 (H) 10/09/2023 07:43 AM    BUN 22 10/09/2023 07:43 AM    CREATININE 1.17 10/09/2023 07:43 AM       Lab Results   Component Value Date/Time    CHOLSTRLTOT 162 2023 08:17 AM    TRIGLYCERIDE 135 2023 08:17 AM    HDL 48 2023 08:17 AM    LDL 87 2023 08:17 AM       Lab Results   Component Value Date/Time    ALTSGPT 10 2022 02:04 PM          Objective     BP (!) 142/88 (BP Location: Right arm, Patient Position: Sitting, BP Cuff Size: Adult)   Pulse 68   Temp 36.8 °C (98.3 °F) (Temporal)   Resp 16   Ht 1.88 m (6' 2\")   Wt 88.5 kg (195 lb 1.6 oz)   SpO2 98%    Body mass index is 25.05 kg/m².    General: alert in no apparent distress.  Cardiovascular: regular " rate and rhythm  Pulmonary: lungs : no wheezing   Gastrointestinal: BS present.       Assessment and Plan     1. Essential hypertension  Chronic condition.  Uncontrolled.  Blood pressure is higher today.  Recommend patient to increase losartan from 50 Mg daily to 100 Mg daily.  Continue metoprolol 25 Mg daily.  Recommend patient to return for follow-up approximately 4 to 6 weeks for blood pressure recheck.  - metoprolol SR (TOPROL XL) 25 MG TABLET SR 24 HR; Take 1 Tablet by mouth every day.  Dispense: 90 Tablet; Refill: 3  - losartan (COZAAR) 100 MG Tab; Take 1 Tablet by mouth every day.  Dispense: 100 Tablet; Refill: 3  - TSH WITH REFLEX TO FT4; Future  - MICROALBUMIN CREAT RATIO URINE; Future    2. S/P AVR (aortic valve replacement)  Chronic condition.  The patient has done well postsurgery.  Patient currently asymptomatic.  The patient request updated referral to cardiology  - REFERRAL TO CARDIOLOGY    3. S/P thoracic aortic aneurysm repair  This is a chronic condition.  The patient currently asymptomatic.  Continue to monitor.  - REFERRAL TO CARDIOLOGY    4. Biventricular cardiac pacemaker in situ  Chronic stable.  Patient to continue follow-up with cardiology service.  - REFERRAL TO CARDIOLOGY    5. S/P mitral valve clip implantation  Chronic stable condition.  Patient currently asymptomatic.  Continue metoprolol and losartan as above.  - REFERRAL TO CARDIOLOGY    6. Stage 3a chronic kidney disease  Chronic condition.  Current status unclear.  Lab test ordered to check GFR.  Recommend patient to avoid NSAID.  Continue to monitor    7. Prediabetes  - HEMOGLOBIN A1C; Future  - Basic Metabolic Panel; Future  This is a chronic condition.  Current status unclear.  Lab test ordered to check A1c.    8. Gastroesophageal reflux disease without esophagitis  Chronic condition.  Stable.  Continue omeprazole mg daily    9. Thrombocytopenia (HCC)  - CBC WITH DIFFERENTIAL; Future  Chronic condition.  Current status unclear.   Lab test ordered to check CBC.  Continue to monitor    10. Nonischemic cardiomyopathy (HCC)  Chronic condition.  Stable.  Patient to continue losartan and metoprolol as above.  Patient to follow-up with cardiology next  - REFERRAL TO CARDIOLOGY    11. Vitamin D deficiency  - VITAMIN D,25 HYDROXY (DEFICIENCY); Future  This is a chronic condition.  Current status unclear.  Blood test ordered to check vitamin D level.    12. H/O prostate cancer  Chronic condition.  The patient no longer wished to check PSA.  He also declined urology referral    13. Dyslipidemia  - ALANINE AMINO-TRANS; Future  - Lipid Profile; Future   chronic condition.  Current status unclear.  Lipid panel requested.  Continue low-cholesterol low-fat diet              Time spent:   42  minutes     Reviewed medical records includin2024 cardiology note  October 3, 2023 medical office note  2023 cardiology note  May 16, 2023 office note  Laboratory data dated 2023, May 24, 2023, 2022    Total time includes face to face time and non-face to face time.  Chart review before the visit, the actual patient visit, and time spent on documentation in the EMR after the visit.  Chart review/prep, review of other providers' records, Independent review of imagings/labs ,  time for history/examination , pt's counseling/education, ordering, prescribing, treatment plan discussed with patient, and care coordination.              Please note that this dictation was created using voice recognition software. I have made every reasonable attempt to correct obvious errors, but I expect that there are errors of grammar and possibly content that I did not discover before finalizing the note.    Rao Quezada MD  Internal Medicine  [facility]

## 2025-03-10 NOTE — ASSESSMENT & PLAN NOTE
Chronic condition.  Followed by cardiology service.  Patient presently taking losartan and metoprolol.

## 2025-03-10 NOTE — ASSESSMENT & PLAN NOTE
Chronic condition.  Previous platelet level was noted to be low.  The patient denies history of bleeding.  Lab test ordered for follow-up.  Patient currently taking aspirin 81 Mg daily

## 2025-03-10 NOTE — ASSESSMENT & PLAN NOTE
Chronic condition.  Patient status post repair of thoracic aortic aneurysm.  Patient followed by cardiology service.  No new symptoms reported.

## 2025-03-10 NOTE — ASSESSMENT & PLAN NOTE
Chronic condition.  Patient currently taking omeprazole.  Patient denies nausea vomiting or dysphagia.   Yes

## 2025-03-10 NOTE — ASSESSMENT & PLAN NOTE
Chronic condition.  Patient currently on diet therapy.  Patient is due for lab test.  Currently the patient asymptomatic

## 2025-03-20 ENCOUNTER — HOSPITAL ENCOUNTER (OUTPATIENT)
Dept: LAB | Facility: MEDICAL CENTER | Age: 85
End: 2025-03-20
Attending: INTERNAL MEDICINE
Payer: MEDICARE

## 2025-03-20 ENCOUNTER — RESULTS FOLLOW-UP (OUTPATIENT)
Dept: MEDICAL GROUP | Facility: PHYSICIAN GROUP | Age: 85
End: 2025-03-20
Payer: MEDICARE

## 2025-03-20 DIAGNOSIS — D69.6 THROMBOCYTOPENIA (HCC): Chronic | ICD-10-CM

## 2025-03-20 DIAGNOSIS — E55.9 VITAMIN D DEFICIENCY: ICD-10-CM

## 2025-03-20 DIAGNOSIS — E78.5 DYSLIPIDEMIA: ICD-10-CM

## 2025-03-20 DIAGNOSIS — D64.9 ANEMIA, UNSPECIFIED TYPE: ICD-10-CM

## 2025-03-20 DIAGNOSIS — I10 ESSENTIAL HYPERTENSION: Chronic | ICD-10-CM

## 2025-03-20 DIAGNOSIS — R73.03 PREDIABETES: Chronic | ICD-10-CM

## 2025-03-20 LAB
25(OH)D3 SERPL-MCNC: 59 NG/ML (ref 30–100)
ALT SERPL-CCNC: 17 U/L (ref 2–50)
ANION GAP SERPL CALC-SCNC: 9 MMOL/L (ref 7–16)
BASOPHILS # BLD AUTO: 0.7 % (ref 0–1.8)
BASOPHILS # BLD: 0.03 K/UL (ref 0–0.12)
BUN SERPL-MCNC: 27 MG/DL (ref 8–22)
CALCIUM SERPL-MCNC: 9.3 MG/DL (ref 8.5–10.5)
CHLORIDE SERPL-SCNC: 110 MMOL/L (ref 96–112)
CHOLEST SERPL-MCNC: 144 MG/DL (ref 100–199)
CO2 SERPL-SCNC: 22 MMOL/L (ref 20–33)
CREAT SERPL-MCNC: 1.26 MG/DL (ref 0.5–1.4)
CREAT UR-MCNC: 138 MG/DL
EOSINOPHIL # BLD AUTO: 0.13 K/UL (ref 0–0.51)
EOSINOPHIL NFR BLD: 3 % (ref 0–6.9)
ERYTHROCYTE [DISTWIDTH] IN BLOOD BY AUTOMATED COUNT: 49.7 FL (ref 35.9–50)
EST. AVERAGE GLUCOSE BLD GHB EST-MCNC: 126 MG/DL
FASTING STATUS PATIENT QL REPORTED: NORMAL
GFR SERPLBLD CREATININE-BSD FMLA CKD-EPI: 56 ML/MIN/1.73 M 2
GLUCOSE SERPL-MCNC: 108 MG/DL (ref 65–99)
HBA1C MFR BLD: 6 % (ref 4–5.6)
HCT VFR BLD AUTO: 39.8 % (ref 42–52)
HDLC SERPL-MCNC: 56 MG/DL
HGB BLD-MCNC: 13 G/DL (ref 14–18)
IMM GRANULOCYTES # BLD AUTO: 0 K/UL (ref 0–0.11)
IMM GRANULOCYTES NFR BLD AUTO: 0 % (ref 0–0.9)
LDLC SERPL CALC-MCNC: 68 MG/DL
LYMPHOCYTES # BLD AUTO: 1.11 K/UL (ref 1–4.8)
LYMPHOCYTES NFR BLD: 25.8 % (ref 22–41)
MCH RBC QN AUTO: 30.7 PG (ref 27–33)
MCHC RBC AUTO-ENTMCNC: 32.7 G/DL (ref 32.3–36.5)
MCV RBC AUTO: 93.9 FL (ref 81.4–97.8)
MICROALBUMIN UR-MCNC: 1.6 MG/DL
MICROALBUMIN/CREAT UR: 12 MG/G (ref 0–30)
MONOCYTES # BLD AUTO: 0.44 K/UL (ref 0–0.85)
MONOCYTES NFR BLD AUTO: 10.2 % (ref 0–13.4)
NEUTROPHILS # BLD AUTO: 2.59 K/UL (ref 1.82–7.42)
NEUTROPHILS NFR BLD: 60.3 % (ref 44–72)
NRBC # BLD AUTO: 0 K/UL
NRBC BLD-RTO: 0 /100 WBC (ref 0–0.2)
PLATELET # BLD AUTO: 87 K/UL (ref 164–446)
PLATELETS.RETICULATED NFR BLD AUTO: 15.8 % (ref 0.6–13.1)
PMV BLD AUTO: 13.2 FL (ref 9–12.9)
POTASSIUM SERPL-SCNC: 4.1 MMOL/L (ref 3.6–5.5)
RBC # BLD AUTO: 4.24 M/UL (ref 4.7–6.1)
SODIUM SERPL-SCNC: 141 MMOL/L (ref 135–145)
TRIGL SERPL-MCNC: 102 MG/DL (ref 0–149)
TSH SERPL DL<=0.005 MIU/L-ACNC: 2.68 UIU/ML (ref 0.38–5.33)
WBC # BLD AUTO: 4.3 K/UL (ref 4.8–10.8)

## 2025-03-20 PROCEDURE — 84443 ASSAY THYROID STIM HORMONE: CPT

## 2025-03-20 PROCEDURE — 82306 VITAMIN D 25 HYDROXY: CPT

## 2025-03-20 PROCEDURE — 80048 BASIC METABOLIC PNL TOTAL CA: CPT

## 2025-03-20 PROCEDURE — 84460 ALANINE AMINO (ALT) (SGPT): CPT

## 2025-03-20 PROCEDURE — 82570 ASSAY OF URINE CREATININE: CPT

## 2025-03-20 PROCEDURE — 82043 UR ALBUMIN QUANTITATIVE: CPT

## 2025-03-20 PROCEDURE — 83036 HEMOGLOBIN GLYCOSYLATED A1C: CPT | Mod: GA

## 2025-03-20 PROCEDURE — 80061 LIPID PANEL: CPT

## 2025-03-20 PROCEDURE — 85025 COMPLETE CBC W/AUTO DIFF WBC: CPT

## 2025-03-20 PROCEDURE — 85055 RETICULATED PLATELET ASSAY: CPT

## 2025-03-20 PROCEDURE — 36415 COLL VENOUS BLD VENIPUNCTURE: CPT

## 2025-03-25 NOTE — Clinical Note
REFERRAL APPROVAL NOTICE         Sent on March 25, 2025                   Ross Mcneal  [address]  [place] [address]                   Dear Mr. Mcneal,    After a careful review of the medical information and benefit coverage, [facility] has processed your referral. See below for additional details.    If applicable, you must be actively enrolled with your insurance for coverage of the authorized service. If you have any questions regarding your coverage, please contact your insurance directly.    REFERRAL INFORMATION   Referral #:  [id number]  Referred-To Department    Referred-By Provider:  Hematology Oncology    Rao Quezada M.D.   [facility]      [address]  [facility] [id number]  [phone] [address] [facility] [address]  [facility] [id number] [phone]    Referral Start Date:  03/20/2025  Referral End Date:   03/20/2026           SCHEDULING  If you do not already have an appointment, please call [phone] to make an appointment.   MORE INFORMATION  As a reminder, [facility] ownership has changed, meaning this location is now owned and operated by [facility]. As such, we want to clarify that our patients should expect to receive two separate bills for the services received at [facility] - one representing the [facility] facility fees as the owner of the establishment, and the other to represent the physician's services and subsequent fees. You can speak with your insurance carrier for a pricing estimate by calling the customer service number on the back of your card and ask about charges for a hospital outpatient visit.  If you do not already have a [employer] account, sign up at: [email or web address]  You can access your medical information, make appointments, see lab results, billing information, and more.  If you have questions regarding this referral, please contact  the [facility] Referrals department at:             [phone]. Monday - Friday  7:30AM - 5:00PM.      Sincerely,  [facility]

## 2025-04-02 ENCOUNTER — OFFICE VISIT (OUTPATIENT)
Dept: CARDIOLOGY | Facility: MEDICAL CENTER | Age: 85
End: 2025-04-02
Attending: INTERNAL MEDICINE
Payer: MEDICARE

## 2025-04-02 VITALS
HEIGHT: 74 IN | SYSTOLIC BLOOD PRESSURE: 132 MMHG | HEART RATE: 46 BPM | BODY MASS INDEX: 24.77 KG/M2 | OXYGEN SATURATION: 97 % | RESPIRATION RATE: 16 BRPM | WEIGHT: 193 LBS | DIASTOLIC BLOOD PRESSURE: 72 MMHG

## 2025-04-02 DIAGNOSIS — Z95.2 S/P AVR (AORTIC VALVE REPLACEMENT): Chronic | ICD-10-CM

## 2025-04-02 DIAGNOSIS — Z98.890 S/P MITRAL VALVE CLIP IMPLANTATION: Chronic | ICD-10-CM

## 2025-04-02 DIAGNOSIS — Z98.890 S/P THORACIC AORTIC ANEURYSM REPAIR: Chronic | ICD-10-CM

## 2025-04-02 DIAGNOSIS — Z95.818 S/P MITRAL VALVE CLIP IMPLANTATION: Chronic | ICD-10-CM

## 2025-04-02 DIAGNOSIS — Z86.79 S/P THORACIC AORTIC ANEURYSM REPAIR: Chronic | ICD-10-CM

## 2025-04-02 DIAGNOSIS — I10 ESSENTIAL HYPERTENSION: Chronic | ICD-10-CM

## 2025-04-02 PROCEDURE — 99215 OFFICE O/P EST HI 40 MIN: CPT | Performed by: INTERNAL MEDICINE

## 2025-04-02 PROCEDURE — 99212 OFFICE O/P EST SF 10 MIN: CPT | Performed by: INTERNAL MEDICINE

## 2025-04-02 PROCEDURE — G2211 COMPLEX E/M VISIT ADD ON: HCPCS | Performed by: INTERNAL MEDICINE

## 2025-04-02 ASSESSMENT — ENCOUNTER SYMPTOMS
WEIGHT LOSS: 0
MUSCULOSKELETAL NEGATIVE: 1
PSYCHIATRIC NEGATIVE: 1
NEUROLOGICAL NEGATIVE: 1
VOMITING: 0
PALPITATIONS: 0
WEAKNESS: 0
CHILLS: 0
CONSTITUTIONAL NEGATIVE: 1
FOCAL WEAKNESS: 0
FEVER: 0
DEPRESSION: 0
NERVOUS/ANXIOUS: 0
EYES NEGATIVE: 1
DIZZINESS: 0
COUGH: 0
GASTROINTESTINAL NEGATIVE: 1
BLURRED VISION: 0
ABDOMINAL PAIN: 0
BRUISES/BLEEDS EASILY: 0
SHORTNESS OF BREATH: 1
CARDIOVASCULAR NEGATIVE: 1
HEADACHES: 0
CLAUDICATION: 0
MYALGIAS: 0
DOUBLE VISION: 0
NAUSEA: 0

## 2025-04-02 NOTE — PROGRESS NOTES
Chief Complaint   Patient presents with    Other     F/V Dx: S/P mitral valve clip implantation    Hypertension       Subjective     Ross Mcneal is a 85 y.o. male who presents today for reestablishment for redo surgical aortic valve replacement, transcatheter edge to edge mitral valve repair, prior cardiomyopathy with recovered left ventricular systolic function with CRT-P placement, hypertension.     Since the patient's last visit on 08/12/24 with Rashard Martin, who has since retired, he has been doing well clinically from cardiac standpoint. He admit to shortness of breath. He denies fatigue, chest pain, palpitations, lower extremity edema, dizziness or syncope. His blood pressure has been high at home. He has been inactive due to his wife's health condition.  He was last seen in my clinic on 06/01/20.      Past Medical History:   Diagnosis Date    Arthritis     hands    Basal cell carcinoma of lip 11/27/2013    Biventricular cardiac pacemaker in situ 03/04/2015    Breath shortness     with exertion    Cancer (HCC) 2003    prostate    GERD (gastroesophageal reflux disease) 10/17/2013    Heart burn     Hypertension      Indigestion     Insomnia 10/17/2013    LBBB (left bundle branch block)      MEDICAL HOME     Pacemaker     Prostate cancer (HCC) 2003    PVCs (premature ventricular contractions) 03/04/2015    S/P AVR (aortic valve replacement) 02/21/2013    [facility]; [place], [place]    S/P prostatectomy 2003    S/P thoracic aortic aneurysm repair 02/21/2013    Seasonal allergies 10/17/2013    Valvular heart disease      Past Surgical History:   Procedure Laterality Date    INGUINAL HERNIA LAPAROSCOPIC BILATERAL N/A 10/20/2023    Procedure: LAPAROSCOPIC BILALTERAL INGUINAL HERNIA REPAIR WITH MESH;  Surgeon: Kilo Aiken M.D.;  Location: SURGERY [facility];  Service: General    TRANSCATHETER MITRAL VALVE REPAIR Right 4/22/2019    Procedure: REPAIR, MITRAL VALVE,  TRANSCATHETER- AND ANY ASSOCIATED PROCEDURES INCLUDING ARTAIL SEPTAL DEFECT CLOSURE AND PERMANENT PACE MAKER PLACEMENT;  Surgeon: Neto Ramirez M.D.;  Location: SURGERY [facility];  Service: Cardiac    TRAVIS  2019    Procedure: ECHOCARDIOGRAM, TRANSESOPHAGEAL;  Surgeon: Neto Ramirez M.D.;  Location: SURGERY [facility];  Service: Cardiac    RECOVERY  2014    Performed by Cath-Recovery Surgery at SURGERY SAME DAY [facility]    AORTIC VALVE REPLACEMENT  13    Bovine tissur valve with ascending aortic repair..  Done in [place].    [facility] CARDIAC CATH  2013    normal coronaries prior to AVR.    OTHER      carcinoma removed from lip    PROSTATECTOMY, RADIAL  .    prostate cancer.    APPENDECTOMY      MITRAL VALVE REPLACE      S/P lisa Clip X1    OTHER CARDIAC SURGERY   and     aVR. Thoracic aortic aneurysm repair.     Family History   Problem Relation Age of Onset    Other Mother          at age 82. Parkinson's disease.    Cancer Father          at age 68. Prostate cancer.    Cancer Sister         Lung    Heart Disease Neg Hx      Social History     Socioeconomic History    Marital status:      Spouse name: Not on file    Number of children: Not on file    Years of education: Not on file    Highest education level: Not on file   Occupational History    Not on file   Tobacco Use    Smoking status: Never    Smokeless tobacco: Never   Vaping Use    Vaping status: Never Used   Substance and Sexual Activity    Alcohol use: Yes     Alcohol/week: 0.6 oz     Types: 1 Glasses of wine per week     Comment: 1/day    Drug use: No    Sexual activity: Not Currently   Other Topics Concern    Not on file   Social History Narrative    Retired . . Moved to [place] in .     Social Drivers of Health     Financial Resource Strain: Not on file   Food Insecurity: Not on file   Transportation Needs: Not on file   Physical Activity: Not on file   Stress: Not  on file   Social Connections: Not on file   Intimate Partner Violence: Not on file   Housing Stability: Not on file     No Known Allergies  (Medications reviewed.)  Outpatient Encounter Medications as of 4/2/2025   Medication Sig Dispense Refill    metoprolol SR (TOPROL XL) 25 MG TABLET SR 24 HR Take 1 Tablet by mouth every day. 90 Tablet 3    losartan (COZAAR) 100 MG Tab Take 1 Tablet by mouth every day. 100 Tablet 3    omeprazole (PRILOSEC) 20 MG delayed-release capsule TAKE 1 CAPSULE BY MOUTH DAILY 90 Capsule 3    cetirizine (ZYRTEC) 10 MG Tab Take 10 mg by mouth 1 time a day as needed for Allergies.      aspirin 81 MG tablet Take 1 Tablet by mouth every evening. 100 Tablet 0    Cholecalciferol (VITAMIN D) 400 UNIT TABS Take 1 Tablet by mouth every day.      [DISCONTINUED] diphenhydrAMINE (BENADRYL) 25 MG capsule Take 1 Each by mouth at bedtime. As needed for sleep (Patient not taking: Reported on 8/12/2024)       No facility-administered encounter medications on file as of 4/2/2025.     Review of Systems   Constitutional: Negative.  Negative for chills, fever, malaise/fatigue and weight loss.   HENT: Negative.  Negative for hearing loss.    Eyes: Negative.  Negative for blurred vision and double vision.   Respiratory:  Positive for shortness of breath. Negative for cough.    Cardiovascular: Negative.  Negative for chest pain, palpitations, claudication and leg swelling.   Gastrointestinal: Negative.  Negative for abdominal pain, nausea and vomiting.   Genitourinary: Negative.  Negative for dysuria and urgency.   Musculoskeletal: Negative.  Negative for joint pain and myalgias.   Skin: Negative.  Negative for itching and rash.   Neurological: Negative.  Negative for dizziness, focal weakness, weakness and headaches.   Endo/Heme/Allergies: Negative.  Does not bruise/bleed easily.   Psychiatric/Behavioral: Negative.  Negative for depression. The patient is not nervous/anxious.               Objective     /72  "(BP Location: Left arm, Patient Position: Sitting, BP Cuff Size: Adult)   Pulse (!) 46   Resp 16   Ht 1.88 m (6' 2\")   Wt 87.5 kg (193 lb)   SpO2 97%   BMI 24.78 kg/m²     Physical Exam  Constitutional:       Appearance: Normal appearance. He is well-developed and normal weight.   HENT:      Head: Normocephalic and atraumatic.   Neck:      Vascular: No JVD.   Cardiovascular:      Rate and Rhythm: Normal rate and regular rhythm.      Heart sounds: Murmur heard.   Pulmonary:      Effort: Pulmonary effort is normal.      Breath sounds: Normal breath sounds.   Abdominal:      General: Bowel sounds are normal.      Palpations: Abdomen is soft.      Comments: No hepatosplenomegaly.   Musculoskeletal:         General: Normal range of motion.   Lymphadenopathy:      Cervical: No cervical adenopathy.   Skin:     General: Skin is warm and dry.   Neurological:      Mental Status: He is alert and oriented to person, place, and time.            CARDIAC STUDIES/PROCEDURES:    AICD PLACEMENT by Tyler Saldana (08/16/22)   Successful CRT-P generator change.      CARDIAC CATHETERIZATION CONCLUSIONS by Dr. Conte (03/12/19)  1.  Severe mitral regurgitation.  2.  Left ventricular ejection fraction 61% with normal wall motion.  3.  Coronary arteries are angiographically normal.  4.  Normal pulmonary pressures.  5.  Elevated bioprosthetic aortic valve pullback gradient.  6.  Elevated LVEDP.    ECHOCARDIOGRAM CONCLUSIONS (07/03/23)  Normal left ventricular systolic function.  The left ventricular ejection fraction is visually estimated to be 70%.  Mild concentric left ventricular hypertrophy.  Known bioprosthetic aortic valve that is functioning normally with   normal transvalvular gradients.  Known transcatheter mitral valve repair which is functioning normally   with appropriate transvalvular gradient.  Trace to mild eccentric mitral regurgitation.  The right ventricle is normal in size and systolic function.  Compared to " the prior study on 4/1/2020, there is now significant change.  (study result reviewed)      ECHOCARDIOGRAM CONCLUSIONS (04/01/20)  Prior study done 5/27/19 - compared to the report of the study done -   there has been no significant change.   Normal left ventricular systolic function.  Left ventricular ejection fraction is visually estimated to be 65%.  Known transcatheter mitral valve repair which is functioning normally   with appropriate transvalvular gradient.  Mean transvalvular gradient is 2 mmHg  Normal function of bioprosthetic aortic valve.  Vmax is 2.23  m/s. Transvalvular gradients are - Peak: 20 mmHg, Mean: 11 mmHg  Right ventricular systolic pressure is estimated to be 20 mmHg.    ECHOCARDIOGRAM CONCLUSIONS (05/27/19)  Prior study done 4/23/19 - compared to the report of the study done - there has been no significant change.   Normal left ventricular systolic function.  Left ventricular ejection fraction is visually estimated to be 65%.  Pacer/ICD wire seen in right ventricle.  Known transcatheter mitral valve repair which is functioning normally with appropriate transvalvular gradient.  Mean transvalvular gradient is 2 mmHg   Trace mitral regurgitation.  Normal function of bioprosthetic valve which is working well.  Vmax is  2.40 m/s. Transvalvular gradients are - Peak: 23 mmHg, Mean: 11 mmHg.   Right ventricular systolic pressure is estimated to be 20 mmHg.     ECHOCARDIOGRAM CONCLUSIONS (04/23/19)  Normal left ventricular systolic function and regional wall motion.  Left ventricular ejection fraction is visually estimated to be 60%.  The right ventricle was normal in size and function.  Post MITRACLIP repair.  Mitral regurgitation present.  Known bioprosthetic aortic valve that is functioning normally with   normal transvalvular gradients.  Compared to the images of the study done  10/17/18 - there has been   interval placement of a MITRACLIP.     ECHOCARDIOGRAM CONCLUSIONS (10/17/18)  Normal left  ventricular size and systolic function.  Left ventricular ejection fraction is visually estimated to be 65%.  Mild concentric left ventricular hypertrophy.  Known bioprosthetic aortic valve that is functioning normally with   normal transvalvular gradients.  Myxomatous mitral valve disease.  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  Severe mitral regurgitation.  Pacer/ICD wire seen in right ventricle.     EKG performed on (04/26/19) EKG shows paced rhythm.  EKG performed on (04/23/19) EKG shows sinus rhythm with right bundle branch block.  EKG performed on (03/11/19) EKG shows paced rhythm.    Laboratory results of (03/20/25) were reviewed. Cholesterol profile of 144/102/56/68 mg/dL noted.    PACEMAKER PLACEMENT by Jam Escobar (12/29/14)  [facility] pacemaker placement      TRANSESOPHAGEAL ECHOCARDIOGRAM CONCLUSIONS by Dr. Conte (02/12/19)  The left ventricle was normal in size and function.  Left ventricular ejection fraction is visually estimated to be 70%.  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  Severe mitral regurgitation.  Known bioprosthetic aortic valve that is functioning normally.     TRANSESOPHAGEAL ECHOCARDIOGRAM CONCLUSIONS by Dr. Samaniego (04/22/19)  Intraoperative TRAVIS during mitraclip procedure.  Baseline images show   normal biventricular function with EF = 55%.  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  Severe mitral   regurgitation.  Single mitraclip deployed at A3/P3 interface with mild   residual MR, mean gradient of 1 mm Hg, and no evidence of complication.    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  No   evidence of complication.      Assessment & Plan     1. S/P AVR (aortic valve replacement)        2. S/P thoracic aortic aneurysm repair        3. S/P mitral valve clip implantation         4. Essential hypertension            Medical Decision Making: Today's Assessment/Status/Plan:        History of surgical aortic valve replacement (homograft valve at [facility] in 2000 and redo aortic valve replacement and ascending aortic aneurysm repair with #23 mm bioprosthetic valve and 28 mm tube graft at [facility] in 02/21/2013): He is an 85 year old male with redo surgical aortic valve replacement, transcatheter edge to edge mitral valve repair, prior cardiomyopathy with recovered left ventricular systolic function with CRT-P placement, hypertension. He is clinically doing well from valvular standpoint.   History of transcatheter edge to edge mitral valve repair (MitraClip) with 1 clip (04/22/19): Plan as above.  Hypertension: Currently, the average blood pressure is well controlled. We will continue with metoprolol, losartan.  History of cardiomyopathy with recovered left ventricular systolic function with CRT-D placement.  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   Greater than 45 minutes of time was spent to review all above information; of which more than 50% of the time was face to face reviewing thee patient's medical issues, medication evaluation, study result review, lab results review    We will follow up in 6 months.    CC Rao Alvares

## 2025-05-14 ENCOUNTER — APPOINTMENT (OUTPATIENT)
Dept: URBAN - METROPOLITAN AREA CLINIC 22 | Facility: CLINIC | Age: 85
Setting detail: DERMATOLOGY
End: 2025-05-14

## 2025-05-14 DIAGNOSIS — L81.4 OTHER MELANIN HYPERPIGMENTATION: ICD-10-CM

## 2025-05-14 DIAGNOSIS — Z85.828 PERSONAL HISTORY OF OTHER MALIGNANT NEOPLASM OF SKIN: ICD-10-CM

## 2025-05-14 DIAGNOSIS — D22 MELANOCYTIC NEVI: ICD-10-CM

## 2025-05-14 DIAGNOSIS — Z71.89 OTHER SPECIFIED COUNSELING: ICD-10-CM

## 2025-05-14 DIAGNOSIS — L57.0 ACTINIC KERATOSIS: ICD-10-CM | Status: INADEQUATELY CONTROLLED

## 2025-05-14 DIAGNOSIS — L82.1 OTHER SEBORRHEIC KERATOSIS: ICD-10-CM

## 2025-05-14 DIAGNOSIS — D18.0 HEMANGIOMA: ICD-10-CM

## 2025-05-14 PROBLEM — D18.01 HEMANGIOMA OF SKIN AND SUBCUTANEOUS TISSUE: Status: ACTIVE | Noted: 2025-05-14

## 2025-05-14 PROBLEM — D22.5 MELANOCYTIC NEVI OF TRUNK: Status: ACTIVE | Noted: 2025-05-14

## 2025-05-14 PROBLEM — D48.5 NEOPLASM OF UNCERTAIN BEHAVIOR OF SKIN: Status: ACTIVE | Noted: 2025-05-14

## 2025-05-14 PROCEDURE — 11102 TANGNTL BX SKIN SINGLE LES: CPT

## 2025-05-14 PROCEDURE — ? DIAGNOSIS COMMENT

## 2025-05-14 PROCEDURE — 17000 DESTRUCT PREMALG LESION: CPT | Mod: 59

## 2025-05-14 PROCEDURE — ? ADDITIONAL NOTES

## 2025-05-14 PROCEDURE — 99213 OFFICE O/P EST LOW 20 MIN: CPT | Mod: 25

## 2025-05-14 PROCEDURE — ? LIQUID NITROGEN

## 2025-05-14 PROCEDURE — ? SUNSCREEN RECOMMENDATIONS

## 2025-05-14 PROCEDURE — ? COUNSELING

## 2025-05-14 PROCEDURE — ? BIOPSY BY SHAVE METHOD

## 2025-05-14 ASSESSMENT — LOCATION ZONE DERM
LOCATION ZONE: LIP
LOCATION ZONE: FACE
LOCATION ZONE: ARM
LOCATION ZONE: TRUNK

## 2025-05-14 ASSESSMENT — LOCATION SIMPLE DESCRIPTION DERM
LOCATION SIMPLE: RIGHT LOWER BACK
LOCATION SIMPLE: RIGHT FOREHEAD
LOCATION SIMPLE: LEFT UPPER BACK
LOCATION SIMPLE: CHEST
LOCATION SIMPLE: LEFT FOREARM
LOCATION SIMPLE: RIGHT LIP

## 2025-05-14 ASSESSMENT — LOCATION DETAILED DESCRIPTION DERM
LOCATION DETAILED: LEFT PROXIMAL DORSAL FOREARM
LOCATION DETAILED: LEFT INFERIOR UPPER BACK
LOCATION DETAILED: RIGHT MEDIAL INFERIOR CHEST
LOCATION DETAILED: RIGHT UPPER CUTANEOUS LIP
LOCATION DETAILED: STERNUM
LOCATION DETAILED: RIGHT INFERIOR LATERAL FOREHEAD
LOCATION DETAILED: RIGHT SUPERIOR LATERAL MIDBACK

## 2025-05-14 NOTE — PROCEDURE: MIPS QUALITY
Quality 130: Documentation Of Current Medications In The Medical Record: Current Medications Documented
Quality 431: Preventive Care And Screening: Unhealthy Alcohol Use - Screening: Patient not identified as an unhealthy alcohol user when screened for unhealthy alcohol use using a systematic screening method
Quality 226: Preventive Care And Screening: Tobacco Use: Screening And Cessation Intervention: Patient screened for tobacco use and is an ex/non-smoker
Quality 47: Advance Care Plan: Advance Care Planning discussed and documented in the medical record; patient did not wish or was not able to name a surrogate decision maker or provide an advance care plan.
Detail Level: Detailed
Detail Level: Detailed

## 2025-05-14 NOTE — PROCEDURE: LIQUID NITROGEN
Render Note In Bullet Format When Appropriate: No
Show Applicator Variable?: Yes
Duration Of Freeze Thaw-Cycle (Seconds): 3
Detail Level: Detailed
Number Of Freeze-Thaw Cycles: 2 freeze-thaw cycles
Post-Care Instructions: I reviewed with the patient in detail post-care instructions. Patient is to wear sunprotection, and avoid picking at any of the treated lesions. Pt may apply Vaseline to crusted or scabbing areas.
Consent: The patient's consent was obtained including but not limited to risks of crusting, scabbing, blistering, scarring, darker or lighter pigmentary change, recurrence, incomplete removal and infection.

## 2025-05-14 NOTE — PROCEDURE: BIOPSY BY SHAVE METHOD

## 2025-05-21 ENCOUNTER — NON-PROVIDER VISIT (OUTPATIENT)
Dept: CARDIOLOGY | Facility: MEDICAL CENTER | Age: 85
End: 2025-05-21
Payer: MEDICARE

## 2025-05-21 PROCEDURE — 93294 REM INTERROG EVL PM/LDLS PM: CPT | Performed by: STUDENT IN AN ORGANIZED HEALTH CARE EDUCATION/TRAINING PROGRAM

## 2025-05-21 NOTE — CARDIAC REMOTE MONITOR - SCAN
Device transmission reviewed. Device demonstrated appropriate function.       Electronically Signed by: Lynnette Elizabeth MD, PhD    5/23/2025  6:50 PM

## 2025-05-28 ENCOUNTER — HOSPITAL ENCOUNTER (OUTPATIENT)
Dept: HEMATOLOGY ONCOLOGY | Facility: MEDICAL CENTER | Age: 85
End: 2025-05-28
Attending: STUDENT IN AN ORGANIZED HEALTH CARE EDUCATION/TRAINING PROGRAM
Payer: MEDICARE

## 2025-05-28 VITALS
OXYGEN SATURATION: 97 % | DIASTOLIC BLOOD PRESSURE: 66 MMHG | SYSTOLIC BLOOD PRESSURE: 130 MMHG | BODY MASS INDEX: 23.99 KG/M2 | HEART RATE: 50 BPM | TEMPERATURE: 98.2 F | WEIGHT: 186.95 LBS | HEIGHT: 74 IN

## 2025-05-28 DIAGNOSIS — D64.9 ANEMIA, UNSPECIFIED TYPE: Chronic | ICD-10-CM

## 2025-05-28 DIAGNOSIS — Z98.890 S/P THORACIC AORTIC ANEURYSM REPAIR: Primary | Chronic | ICD-10-CM

## 2025-05-28 DIAGNOSIS — Z86.79 S/P THORACIC AORTIC ANEURYSM REPAIR: Primary | Chronic | ICD-10-CM

## 2025-05-28 DIAGNOSIS — D69.6 THROMBOCYTOPENIA (HCC): Chronic | ICD-10-CM

## 2025-05-28 PROCEDURE — 99204 OFFICE O/P NEW MOD 45 MIN: CPT | Performed by: STUDENT IN AN ORGANIZED HEALTH CARE EDUCATION/TRAINING PROGRAM

## 2025-05-28 PROCEDURE — 99212 OFFICE O/P EST SF 10 MIN: CPT | Performed by: STUDENT IN AN ORGANIZED HEALTH CARE EDUCATION/TRAINING PROGRAM

## 2025-05-28 NOTE — PROGRESS NOTES
Consult:  Hematology/Oncology    Primary Care:  Rao Quezada M.D.    Diagnosis:   Normocytic anemia  Thrombocytopenia    Chief Complaint:  Establish Care    History of Presenting Illness:  Ross Mcneal is a 85 y.o. male with PMH male with past medical history aortic valve replacement with bioprosthetic valve in 2013, mitral valve repair HFrEF with recovered EF and CRT-D placement, CKD, history of prostate cancer status post prostatectomy in 2003 who presents today to establish care for normocytic anemia thrombocytopenia.     Last labs with a hemoglobin of 13 and platelet count in the 70s.  Baseline hemoglobin around 14 platelet count around 100s.  He feels well with no acute complaints and no concerns.  Denies easy bleeding or bruising, hematuria, melena or hematochezia.  Denies fever, chills, weight loss but unintentional.  Reports about 10 pound weight loss with changes in diet as he has moved to his assisted living facility.  He is primary caregiver for his wife who has Alzheimer's.    Past Medical History[1]    Past Surgical History[2]    Social History     Socioeconomic History    Marital status:      Spouse name: Not on file    Number of children: Not on file    Years of education: Not on file    Highest education level: Not on file   Occupational History    Not on file   Tobacco Use    Smoking status: Never    Smokeless tobacco: Never   Vaping Use    Vaping status: Never Used   Substance and Sexual Activity    Alcohol use: Yes     Alcohol/week: 0.6 oz     Types: 1 Glasses of wine per week     Comment: 1/day    Drug use: No    Sexual activity: Not Currently   Other Topics Concern    Not on file   Social History Narrative    Retired . . Moved to [place] in October, 2012.     Social Drivers of Health     Financial Resource Strain: Not on file   Food Insecurity: Not on file   Transportation Needs: Not on file   Physical Activity: Not on file   Stress: Not on file   Social Connections:  "Not on file   Intimate Partner Violence: Not on file   Housing Stability: Not on file       Family History   Problem Relation Age of Onset    Other Mother          at age 82. Parkinson's disease.    Cancer Father          at age 68. Prostate cancer.    Cancer Sister         Lung    Heart Disease Neg Hx        OB History   No obstetric history on file.       Allergies as of 2025    (No Known Allergies)       Current Medications[3]    Review of Systems:  ROS as above       Physical Exam:  Vitals:    25 0941   BP: 130/66   BP Location: Right arm   Patient Position: Sitting   BP Cuff Size: Adult   Pulse: (!) 50   Temp: 36.8 °C (98.2 °F)   TempSrc: Temporal   SpO2: 97%   Weight: 84.8 kg (186 lb 15.2 oz)   Height: 1.88 m (6' 2.02\")     Physical Exam  Constitutional:       General: He is not in acute distress.  HENT:      Head: Normocephalic and atraumatic.      Mouth/Throat:      Mouth: Mucous membranes are moist.      Pharynx: No oropharyngeal exudate.   Eyes:      General: No scleral icterus.     Conjunctiva/sclera: Conjunctivae normal.   Pulmonary:      Effort: Pulmonary effort is normal. No respiratory distress.   Abdominal:      General: There is no distension.      Palpations: Abdomen is soft.      Tenderness: There is no abdominal tenderness.   Musculoskeletal:         General: Normal range of motion.      Cervical back: Neck supple. No rigidity.      Comments: 2+ edema to shins   Lymphadenopathy:      Cervical: No cervical adenopathy.   Skin:     Coloration: Skin is not jaundiced.      Findings: No bruising.   Neurological:      General: No focal deficit present.      Mental Status: He is alert and oriented to person, place, and time.   Psychiatric:         Mood and Affect: Mood normal.         Thought Content: Thought content normal.         Judgment: Judgment normal.        Labs:  Lab Results   Component Value Date/Time    WBC 4.3 (L) 2025 07:55 AM    RBC 4.24 (L) 2025 07:55 AM    " HEMOGLOBIN 13.0 (L) 03/20/2025 07:55 AM    HEMATOCRIT 39.8 (L) 03/20/2025 07:55 AM    MCV 93.9 03/20/2025 07:55 AM    MCH 30.7 03/20/2025 07:55 AM    MCHC 32.7 03/20/2025 07:55 AM    RDW 49.7 03/20/2025 07:55 AM    PLATELETCT 87 (L) 03/20/2025 07:55 AM    MPV 13.2 (H) 03/20/2025 07:55 AM    NEUTSPOLYS 60.30 03/20/2025 07:55 AM    LYMPHOCYTES 25.80 03/20/2025 07:55 AM    MONOCYTES 10.20 03/20/2025 07:55 AM    EOSINOPHILS 3.00 03/20/2025 07:55 AM    BASOPHILS 0.70 03/20/2025 07:55 AM        Lab Results   Component Value Date/Time    SODIUM 141 03/20/2025 07:55 AM    POTASSIUM 4.1 03/20/2025 07:55 AM    CHLORIDE 110 03/20/2025 07:55 AM    CO2 22 03/20/2025 07:55 AM    GLUCOSE 108 (H) 03/20/2025 07:55 AM    BUN 27 (H) 03/20/2025 07:55 AM    CREATININE 1.26 03/20/2025 07:55 AM    BUNCREATRAT 13 02/01/2019 07:31 AM        Imaging:   Reviewed    Assessment & Plan:  Ross Mcneal is a 85 y.o. male with PMH male with past medical history aortic valve replacement with bioprosthetic valve in 2013, mitral valve repair HFrEF with recovered EF and CRT-D placement, CKD, history of prostate cancer status post prostatectomy in 2003  presents today to establish care for normocytic anemia and thrombocytopenia.     -Will plan to repeat CBC. Denies any evidence of hematuria, hematochezia or melena.  Declined bone marrow biopsy when I brought up discussion, and has declined colonoscopy, urology evaluation or PSA or anything invasive for workup citing that he has enough issues at this time  -Will order CMP to assess for bilirubin. With history of mitraclip and AVR thrombocytopenia and anemia may be related to shearing across the valve.   -PCP has also ordered B12, folate, iron profile, ferritin and reticulocyte count and I agree and instructed him to get those labs done as well  -Assuming labs are stable RTC in 3 months    Any questions and concerns raised by the patient were answered to the best of my ability. Thank you for  allowing me to participate in the care for this patient. Please feel free to contact me for any questions or concerns.            [1]   Past Medical History:  Diagnosis Date    Arthritis     hands    Basal cell carcinoma of lip 11/27/2013    Biventricular cardiac pacemaker in situ 03/04/2015    Breath shortness     with exertion    Cancer (HCC) 2003    prostate    GERD (gastroesophageal reflux disease) 10/17/2013    Heart burn     Hypertension      Indigestion     Insomnia 10/17/2013    LBBB (left bundle branch block)      MEDICAL HOME     Pacemaker     Prostate cancer (HCC) 2003    PVCs (premature ventricular contractions) 03/04/2015    S/P AVR (aortic valve replacement) 02/21/2013    [facility]; [place], [place]    S/P prostatectomy 2003    S/P thoracic aortic aneurysm repair 02/21/2013    Seasonal allergies 10/17/2013    Valvular heart disease    [2]   Past Surgical History:  Procedure Laterality Date    INGUINAL HERNIA LAPAROSCOPIC BILATERAL N/A 10/20/2023    Procedure: LAPAROSCOPIC BILALTERAL INGUINAL HERNIA REPAIR WITH MESH;  Surgeon: Kilo Aiken M.D.;  Location: SURGERY [facility];  Service: General    TRANSCATHETER MITRAL VALVE REPAIR Right 4/22/2019    Procedure: REPAIR, MITRAL VALVE, TRANSCATHETER- AND ANY ASSOCIATED PROCEDURES INCLUDING ARTAIL SEPTAL DEFECT CLOSURE AND PERMANENT PACE MAKER PLACEMENT;  Surgeon: Neto Ramirez M.D.;  Location: SURGERY [facility];  Service: Cardiac    TRAVIS  4/22/2019    Procedure: ECHOCARDIOGRAM, TRANSESOPHAGEAL;  Surgeon: Neto Ramirez M.D.;  Location: SURGERY [facility];  Service: Cardiac    RECOVERY  12/29/2014    Performed by Cath-Recovery Surgery at SURGERY SAME DAY [facility]    AORTIC VALVE REPLACEMENT  2/21/13    Bovine tissur valve with ascending aortic repair..  Done in [place].    [facility] CARDIAC CATH  2/2013    normal coronaries prior to AVR.    OTHER  2013    carcinoma removed from lip    PROSTATECTOMY, RADIAL  2003.     prostate cancer.    APPENDECTOMY      MITRAL VALVE REPLACE      S/P lisa Clip X1    OTHER CARDIAC SURGERY  2000 and 2013    aVR. Thoracic aortic aneurysm repair.   [3]   Current Outpatient Medications:     metoprolol SR (TOPROL XL) 25 MG TABLET SR 24 HR, Take 1 Tablet by mouth every day., Disp: 90 Tablet, Rfl: 3    losartan (COZAAR) 100 MG Tab, Take 1 Tablet by mouth every day., Disp: 100 Tablet, Rfl: 3    omeprazole (PRILOSEC) 20 MG delayed-release capsule, TAKE 1 CAPSULE BY MOUTH DAILY, Disp: 90 Capsule, Rfl: 3    cetirizine (ZYRTEC) 10 MG Tab, Take 10 mg by mouth 1 time a day as needed for Allergies., Disp: , Rfl:     aspirin 81 MG tablet, Take 1 Tablet by mouth every evening., Disp: 100 Tablet, Rfl: 0    Cholecalciferol (VITAMIN D) 400 UNIT TABS, Take 1 Tablet by mouth every day., Disp: , Rfl:

## 2025-06-03 ENCOUNTER — OFFICE VISIT (OUTPATIENT)
Dept: URGENT CARE | Facility: PHYSICIAN GROUP | Age: 85
End: 2025-06-03
Payer: MEDICARE

## 2025-06-03 ENCOUNTER — TELEPHONE (OUTPATIENT)
Dept: MEDICAL GROUP | Facility: PHYSICIAN GROUP | Age: 85
End: 2025-06-03
Payer: MEDICARE

## 2025-06-03 VITALS
OXYGEN SATURATION: 96 % | RESPIRATION RATE: 19 BRPM | TEMPERATURE: 97.9 F | HEIGHT: 74 IN | HEART RATE: 81 BPM | DIASTOLIC BLOOD PRESSURE: 80 MMHG | SYSTOLIC BLOOD PRESSURE: 138 MMHG | WEIGHT: 185.52 LBS | BODY MASS INDEX: 23.81 KG/M2

## 2025-06-03 DIAGNOSIS — U07.1 COVID-19: Primary | ICD-10-CM

## 2025-06-03 PROCEDURE — 3079F DIAST BP 80-89 MM HG: CPT | Performed by: PHYSICIAN ASSISTANT

## 2025-06-03 PROCEDURE — 3075F SYST BP GE 130 - 139MM HG: CPT | Performed by: PHYSICIAN ASSISTANT

## 2025-06-03 PROCEDURE — 99214 OFFICE O/P EST MOD 30 MIN: CPT | Performed by: PHYSICIAN ASSISTANT

## 2025-06-03 ASSESSMENT — ENCOUNTER SYMPTOMS
FEVER: 1
SORE THROAT: 1
COUGH: 1

## 2025-06-03 NOTE — PROGRESS NOTES
"  Subjective:   Ross Mcneal is a 85 y.o. male who presents today with   Chief Complaint   Patient presents with    Cough     C/o sore throat, loss of taste and fever x 5 days. Tested positive for covid 4 days ago.       Cough  This is a new problem. Episode onset: 5 days. The problem has been unchanged. The problem occurs every few minutes. Associated symptoms include a fever and a sore throat. Treatments tried: ibuprofen. The treatment provided mild relief.       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  MEDS: Current Medications[1]  ALLERGIES: Allergies[2]  SURGHX: Past Surgical History[3]  SOCHX:  reports that he has never smoked. He has never used smokeless tobacco. He reports current alcohol use of about 0.6 oz of alcohol per week. He reports that he does not use drugs.  FH: Reviewed with patient, not pertinent to this visit.     Review of Systems   Constitutional:  Positive for fever.   HENT:  Positive for sore throat.    Respiratory:  Positive for cough.       Objective:   /80 (BP Location: Left arm, Patient Position: Sitting, BP Cuff Size: Adult long)   Pulse 81   Temp 36.6 °C (97.9 °F) (Temporal)   Resp 19   Ht 1.88 m (6' 2\")   Wt 84.1 kg (185 lb 8.3 oz)   SpO2 96%   BMI 23.82 kg/m²   Physical Exam  Vitals and nursing note reviewed.   Constitutional:       General: He is not in acute distress.     " Appearance: Normal appearance. He is well-developed. He is not ill-appearing or toxic-appearing.   HENT:      Head: Normocephalic and atraumatic.      Right Ear: Hearing normal.      Left Ear: Hearing normal.   Cardiovascular:      Rate and Rhythm: Normal rate and regular rhythm.      Heart sounds: Normal heart sounds.   Pulmonary:      Effort: Pulmonary effort is normal.      Breath sounds: Normal breath sounds.   Musculoskeletal:      Comments: Normal movement in all 4 extremities   Skin:     General: Skin is warm and dry.   Neurological:      Mental Status: He is alert.      Coordination: Coordination normal.   Psychiatric:         Mood and Affect: Mood normal.       Assessment/Plan:   Assessment    1. COVID-19  - Nirmatrelvir&Ritonavir 150/100 10 x 150 MG & 10 x 100MG Tablet Therapy Pack; Take 150 mg nirmatrelvir (one 150 mg tablet) with 100 mg ritonavir (one 100 mg tablet) by mouth, with both tablets taken together twice daily for 5 days.  Dispense: 20 Each; Refill: 0    Symptoms and presentation consistent with COVID at this time.  Vital signs are stable on exam today.  Discussed CDC guidelines including self isolation at home.   Patient encouraged to get plenty of rest, use OTC tylenol for pain/fever, and drink plenty of fluids.      GFR from 2 months ago was 56.    Differential diagnosis, natural history, supportive care, and indications for immediate follow-up discussed.   Patient given instructions and understanding of medications and treatment.    If not improving in 3-5 days, F/U with PCP or return to  if symptoms worsen.  Strict ER precautions given.  Patient agreeable to plan.    Please note that this dictation was created using voice recognition software. I have made every reasonable attempt to correct obvious errors, but I expect that there are errors of grammar and possibly content that I did not discover before finalizing the note.    Franklin Machado PA-C         [1]   Current Outpatient  Medications:     Nirmatrelvir&Ritonavir 150/100 10 x 150 MG & 10 x 100MG Tablet Therapy Pack, Take 150 mg nirmatrelvir (one 150 mg tablet) with 100 mg ritonavir (one 100 mg tablet) by mouth, with both tablets taken together twice daily for 5 days., Disp: 20 Each, Rfl: 0    metoprolol SR (TOPROL XL) 25 MG TABLET SR 24 HR, Take 1 Tablet by mouth every day., Disp: 90 Tablet, Rfl: 3    losartan (COZAAR) 100 MG Tab, Take 1 Tablet by mouth every day., Disp: 100 Tablet, Rfl: 3    omeprazole (PRILOSEC) 20 MG delayed-release capsule, TAKE 1 CAPSULE BY MOUTH DAILY, Disp: 90 Capsule, Rfl: 3    cetirizine (ZYRTEC) 10 MG Tab, Take 10 mg by mouth 1 time a day as needed for Allergies., Disp: , Rfl:     aspirin 81 MG tablet, Take 1 Tablet by mouth every evening., Disp: 100 Tablet, Rfl: 0    Cholecalciferol (VITAMIN D) 400 UNIT TABS, Take 1 Tablet by mouth every day., Disp: , Rfl:   [2] No Known Allergies  [3]   Past Surgical History:  Procedure Laterality Date    INGUINAL HERNIA LAPAROSCOPIC BILATERAL N/A 10/20/2023    Procedure: LAPAROSCOPIC BILALTERAL INGUINAL HERNIA REPAIR WITH MESH;  Surgeon: Kilo Aiken M.D.;  Location: [facility];  Service: General    TRANSCATHETER MITRAL VALVE REPAIR Right 4/22/2019    Procedure: REPAIR, MITRAL VALVE, TRANSCATHETER- AND ANY ASSOCIATED PROCEDURES INCLUDING ARTAIL SEPTAL DEFECT CLOSURE AND PERMANENT PACE MAKER PLACEMENT;  Surgeon: Neto Ramirez M.D.;  Location: [facility];  Service: Cardiac    TRAVIS  4/22/2019    Procedure: ECHOCARDIOGRAM, TRANSESOPHAGEAL;  Surgeon: Neto Ramirez M.D.;  Location: [facility];  Service: Cardiac    RECOVERY  12/29/2014    Performed by Cath-Recovery Surgery at SURGERY SAME DAY [facility]    AORTIC VALVE REPLACEMENT  2/21/13    Bovine tissur valve with ascending aortic repair..  Done in [place].    [facility] CARDIAC CATH  2/2013    normal coronaries prior to AVR.    OTHER 2013    carcinoma removed from lip    PROSTATECTOMY,  RADIAL  2003.    prostate cancer.    APPENDECTOMY      MITRAL VALVE REPLACE      S/P lisa Clip X1    OTHER CARDIAC SURGERY  2000 and 2013    aVR. Thoracic aortic aneurysm repair.

## 2025-06-03 NOTE — TELEPHONE ENCOUNTER
Pls call pt Would recommend pt to put on a mask  Pt should go to to urgent care to be evaluated. We need to check his current clinical condition

## 2025-06-03 NOTE — TELEPHONE ENCOUNTER
1. Caller Name: Ross Mcneal                            Call Back Number: [phone]      How would the patient prefer to be contacted with a response: Phone call do NOT leave a detailed message    Pt states he tested pos for covid and is asking for a rx for Paxlovid.  He states he is in a nursing facility and is quarantined and cannot get to an urgent care.

## 2025-06-28 ENCOUNTER — HOSPITAL ENCOUNTER (OUTPATIENT)
Dept: LAB | Facility: MEDICAL CENTER | Age: 85
End: 2025-06-28
Attending: STUDENT IN AN ORGANIZED HEALTH CARE EDUCATION/TRAINING PROGRAM
Payer: MEDICARE

## 2025-06-28 DIAGNOSIS — D64.9 ANEMIA, UNSPECIFIED TYPE: Chronic | ICD-10-CM

## 2025-06-28 DIAGNOSIS — D69.6 THROMBOCYTOPENIA (HCC): Chronic | ICD-10-CM

## 2025-06-28 DIAGNOSIS — Z98.890 S/P THORACIC AORTIC ANEURYSM REPAIR: Chronic | ICD-10-CM

## 2025-06-28 DIAGNOSIS — Z86.79 S/P THORACIC AORTIC ANEURYSM REPAIR: Chronic | ICD-10-CM

## 2025-06-28 LAB
ALBUMIN SERPL BCP-MCNC: 3.9 G/DL (ref 3.2–4.9)
ALBUMIN/GLOB SERPL: 1.2 G/DL
ALP SERPL-CCNC: 91 U/L (ref 30–99)
ALT SERPL-CCNC: 17 U/L (ref 2–50)
ANION GAP SERPL CALC-SCNC: 11 MMOL/L (ref 7–16)
AST SERPL-CCNC: 23 U/L (ref 12–45)
BASOPHILS # BLD AUTO: 0.9 % (ref 0–1.8)
BASOPHILS # BLD: 0.04 K/UL (ref 0–0.12)
BILIRUB SERPL-MCNC: 0.5 MG/DL (ref 0.1–1.5)
BUN SERPL-MCNC: 22 MG/DL (ref 8–22)
CALCIUM ALBUM COR SERPL-MCNC: 9.5 MG/DL (ref 8.5–10.5)
CALCIUM SERPL-MCNC: 9.4 MG/DL (ref 8.5–10.5)
CHLORIDE SERPL-SCNC: 109 MMOL/L (ref 96–112)
CO2 SERPL-SCNC: 22 MMOL/L (ref 20–33)
CREAT SERPL-MCNC: 1.17 MG/DL (ref 0.5–1.4)
EOSINOPHIL # BLD AUTO: 0.17 K/UL (ref 0–0.51)
EOSINOPHIL NFR BLD: 3.9 % (ref 0–6.9)
ERYTHROCYTE [DISTWIDTH] IN BLOOD BY AUTOMATED COUNT: 48.9 FL (ref 35.9–50)
FASTING STATUS PATIENT QL REPORTED: NORMAL
GFR SERPLBLD CREATININE-BSD FMLA CKD-EPI: 61 ML/MIN/1.73 M 2
GLOBULIN SER CALC-MCNC: 3.3 G/DL (ref 1.9–3.5)
GLUCOSE SERPL-MCNC: 109 MG/DL (ref 65–99)
HCT VFR BLD AUTO: 37.2 % (ref 42–52)
HGB BLD-MCNC: 12 G/DL (ref 14–18)
IMM GRANULOCYTES # BLD AUTO: 0.01 K/UL (ref 0–0.11)
IMM GRANULOCYTES NFR BLD AUTO: 0.2 % (ref 0–0.9)
LYMPHOCYTES # BLD AUTO: 1.35 K/UL (ref 1–4.8)
LYMPHOCYTES NFR BLD: 30.8 % (ref 22–41)
MCH RBC QN AUTO: 30.5 PG (ref 27–33)
MCHC RBC AUTO-ENTMCNC: 32.3 G/DL (ref 32.3–36.5)
MCV RBC AUTO: 94.4 FL (ref 81.4–97.8)
MONOCYTES # BLD AUTO: 0.55 K/UL (ref 0–0.85)
MONOCYTES NFR BLD AUTO: 12.6 % (ref 0–13.4)
NEUTROPHILS # BLD AUTO: 2.26 K/UL (ref 1.82–7.42)
NEUTROPHILS NFR BLD: 51.6 % (ref 44–72)
NRBC # BLD AUTO: 0 K/UL
NRBC BLD-RTO: 0 /100 WBC (ref 0–0.2)
PLATELET # BLD AUTO: 86 K/UL (ref 164–446)
PLATELETS.RETICULATED NFR BLD AUTO: 16.9 % (ref 0.6–13.1)
PMV BLD AUTO: 12.9 FL (ref 9–12.9)
POTASSIUM SERPL-SCNC: 3.8 MMOL/L (ref 3.6–5.5)
PROT SERPL-MCNC: 7.2 G/DL (ref 6–8.2)
RBC # BLD AUTO: 3.94 M/UL (ref 4.7–6.1)
SODIUM SERPL-SCNC: 142 MMOL/L (ref 135–145)
WBC # BLD AUTO: 4.4 K/UL (ref 4.8–10.8)

## 2025-06-28 PROCEDURE — 80053 COMPREHEN METABOLIC PANEL: CPT

## 2025-06-28 PROCEDURE — 85055 RETICULATED PLATELET ASSAY: CPT

## 2025-06-28 PROCEDURE — 85025 COMPLETE CBC W/AUTO DIFF WBC: CPT

## 2025-06-28 PROCEDURE — 36415 COLL VENOUS BLD VENIPUNCTURE: CPT

## 2025-07-09 ENCOUNTER — HOSPITAL ENCOUNTER (OUTPATIENT)
Dept: LAB | Facility: MEDICAL CENTER | Age: 85
End: 2025-07-09
Attending: INTERNAL MEDICINE
Payer: MEDICARE

## 2025-07-09 DIAGNOSIS — D64.9 ANEMIA, UNSPECIFIED TYPE: ICD-10-CM

## 2025-07-09 LAB
FERRITIN SERPL-MCNC: 368 NG/ML (ref 22–322)
FOLATE SERPL-MCNC: 12.1 NG/ML
HGB RETIC QN AUTO: 36.2 PG/CELL (ref 29–35)
IMM RETICS NFR: 3.3 % (ref 2.6–16.1)
IRON SERPL-MCNC: 86 UG/DL (ref 50–180)
RETICS # AUTO: 0.04 M/UL (ref 0.04–0.12)
RETICS/RBC NFR: 1 % (ref 0.8–2.6)
VIT B12 SERPL-MCNC: 305 PG/ML (ref 211–911)

## 2025-07-09 PROCEDURE — 85046 RETICYTE/HGB CONCENTRATE: CPT

## 2025-07-09 PROCEDURE — 82607 VITAMIN B-12: CPT

## 2025-07-09 PROCEDURE — 82728 ASSAY OF FERRITIN: CPT

## 2025-07-09 PROCEDURE — 83540 ASSAY OF IRON: CPT

## 2025-07-09 PROCEDURE — 36415 COLL VENOUS BLD VENIPUNCTURE: CPT

## 2025-07-09 PROCEDURE — 82746 ASSAY OF FOLIC ACID SERUM: CPT

## 2025-07-11 ENCOUNTER — HOSPITAL ENCOUNTER (OUTPATIENT)
Facility: MEDICAL CENTER | Age: 85
End: 2025-07-11
Attending: INTERNAL MEDICINE
Payer: MEDICARE

## 2025-07-11 PROCEDURE — 82272 OCCULT BLD FECES 1-3 TESTS: CPT

## 2025-07-12 DIAGNOSIS — D64.9 ANEMIA, UNSPECIFIED TYPE: ICD-10-CM

## 2025-07-12 LAB — HEMOCCULT STL QL: NEGATIVE

## 2025-08-20 ENCOUNTER — NON-PROVIDER VISIT (OUTPATIENT)
Dept: CARDIOLOGY | Facility: MEDICAL CENTER | Age: 85
End: 2025-08-20
Payer: MEDICARE

## 2025-08-20 PROCEDURE — 93294 REM INTERROG EVL PM/LDLS PM: CPT | Performed by: INTERNAL MEDICINE

## 2025-08-29 DIAGNOSIS — K21.9 GASTROESOPHAGEAL REFLUX DISEASE WITHOUT ESOPHAGITIS: ICD-10-CM

## 2025-08-29 RX ORDER — OMEPRAZOLE 20 MG/1
20 CAPSULE, DELAYED RELEASE ORAL DAILY
Qty: 90 CAPSULE | Refills: 3 | Status: SHIPPED | OUTPATIENT
Start: 2025-08-29

## (undated) DEVICE — BANDAID SHEER STRIP 3/4 IN (100EA/BX 12BX/CA)

## (undated) DEVICE — PROTECTOR ULNA NERVE - (36PR/CA)

## (undated) DEVICE — MICRODRIP PRIMARY VENTED 60 (48EA/CA) THIS WAS PART #2C8428 WHICH WAS DISCONTINUED

## (undated) DEVICE — SUCTION INSTRUMENT YANKAUER BULBOUS TIP W/O VENT (50EA/CA)

## (undated) DEVICE — PACK TAVR (3EA/CA)

## (undated) DEVICE — ELECTRODE DUAL RETURN W/ CORD - (50/PK)

## (undated) DEVICE — PACK SINGLE BASIN - (6/CA)

## (undated) DEVICE — BANDAID X-LARGE 2 X 4 IN LF (50EA/BX)

## (undated) DEVICE — DEVICE CLOSER PERCLOSE - (10/BX) PROGLIDE SYSTEM

## (undated) DEVICE — SENSOR SPO2 NEO LNCS ADHESIVE (20/BX) SEE USER NOTES

## (undated) DEVICE — SUCTION INSTRUMENT YANKAUER OPEN TIP W/O VENT (50EA/CA)

## (undated) DEVICE — TUBING PRSS MNTR 48IN NAMIC - (25/CA)

## (undated) DEVICE — BAG RESUSCITATION DISPOSABLE - WITH MASK (10 EA/CA)

## (undated) DEVICE — TROCAR 5X100 NON BLADED Z-TH - READ KII (6/BX)

## (undated) DEVICE — SLEEVE, VASO, THIGH, MED

## (undated) DEVICE — TROCAR LAPSCP 100MM 12MM NTHRD - (6/BX)

## (undated) DEVICE — SUTURE 4-0 VICRYL PLUS SH - UNDYED 27 INCH (36PK/BX)

## (undated) DEVICE — INTRODUCER SHEATH 6FR 2.5CM - DILATOR PROTRUDING (10/BX)

## (undated) DEVICE — SET EXTENSION WITH 2 PORTS (48EA/CA) ***PART #2C8610 IS A SUBSTITUTE*****

## (undated) DEVICE — KIT ULTRASND COVER - (20EA/CA)

## (undated) DEVICE — SUTURE OHS

## (undated) DEVICE — ELECTRODE 850 FOAM ADHESIVE - HYDROGEL RADIOTRNSPRNT (50/PK)

## (undated) DEVICE — KIT ROOM DECONTAMINATION

## (undated) DEVICE — DILATOR VASC 18FR 20CM STD - JCD18.038-20

## (undated) DEVICE — WIRE VERSACORE .035 MOD J 145CM (5EA/BX)

## (undated) DEVICE — CANNULA W/SEAL 5X100 Z-THRE - ADED KII (12/BX)

## (undated) DEVICE — GOWN WARMING STANDARD FLEX - (30/CA)

## (undated) DEVICE — BLADE SURGICAL #11 - (50/BX)

## (undated) DEVICE — IV SET, NON-VENT PLUMB PUMP

## (undated) DEVICE — GOWN SURGEONS LARGE - (32/CA)

## (undated) DEVICE — CLOSURE SKIN STRIP 1/2 X 4 IN - (STERI STRIP) (50/BX 4BX/CA)

## (undated) DEVICE — GLOVE SZ 7 BIOGEL PI MICRO - PF LF (50PR/BX 4BX/CA)

## (undated) DEVICE — DRESSING TRANSPARENT FILM TEGADERM 4 X 4.75" (50EA/BX)"

## (undated) DEVICE — SUTURE GENERAL

## (undated) DEVICE — HEAD HOLDER JUNIOR/ADULT

## (undated) DEVICE — SENSOR OXIMETER ADULT SPO2 RD SET (20EA/BX)

## (undated) DEVICE — TUBE CONNECT SUCTION CLEAR 120 X 1/4" (50EA/CA)"

## (undated) DEVICE — DEVICE 5MM ABSRB STRAP FIXATION  (6EA/BX)

## (undated) DEVICE — SET LEADWIRE 5 LEAD BEDSIDE DISPOSABLE ECG (1SET OF 5/EA)

## (undated) DEVICE — SET FLUID WARMING STANDARD FLOW - (10/CA)

## (undated) DEVICE — SODIUM CHL IRRIGATION 0.9% 1000ML (12EA/CA)

## (undated) DEVICE — SUTURE 0 VICRYL PLUS UR-6 - 27 INCH (36/BX)

## (undated) DEVICE — DRAPE MAYO STAND - (30/CA)

## (undated) DEVICE — SOD. CHL. INJ. 0.9% 1000 ML - (14EA/CA 60CA/PF)

## (undated) DEVICE — CANISTER SUCTION 3000ML MECHANICAL FILTER AUTO SHUTOFF MEDI-VAC NONSTERILE LF DISP  (40EA/CA)

## (undated) DEVICE — GLOVE SIZE 6.5  SURGEON ACCELERATOR FREE GREEN (50PR/BX)

## (undated) DEVICE — KIT ANESTHESIA W/CIRCUIT & 3/LT BAG W/FILTER (20EA/CA)

## (undated) DEVICE — TUBING CLEARLINK DUO-VENT - C-FLO (48EA/CA)

## (undated) DEVICE — SET BIFURCATED BLOOD - (48EA/CS)

## (undated) DEVICE — TRANSDUCER ADULT DISP. SINGLE BONDED STERILE - (20EA/CA)

## (undated) DEVICE — TOWELS CLOTH SURGICAL - (4/PK 20PK/CA)

## (undated) DEVICE — LACTATED RINGERS INJ 1000 ML - (14EA/CA 60CA/PF)

## (undated) DEVICE — GLOVE BIOGEL INDICATOR SZ 8 SURGICAL PF LTX - (50/BX 4BX/CA)

## (undated) DEVICE — STERI STRIP COMPOUND BENZOIN - TINCTURE 0.6ML WITH APPLICATOR (40EA/BX)

## (undated) DEVICE — PACK LAP CHOLE OR - (2EA/CA)

## (undated) DEVICE — GLOVE BIOGEL PI INDICATOR SZ 6.5 SURGICAL PF LF - (50/BX 4BX/CA)

## (undated) DEVICE — BLANKET UNDERBODY FULL ACCES - (5/CA)

## (undated) DEVICE — GOWN SURGEONS X-LARGE - DISP. (30/CA)

## (undated) DEVICE — MASK ANESTHESIA ADULT  - (100/CA)

## (undated) DEVICE — BLADE SURGICAL CLIPPER - (50EA/CA)

## (undated) DEVICE — CHLORAPREP 26 ML APPLICATOR - ORANGE TINT(25/CA)

## (undated) DEVICE — STOPCOCK 3-WAY W/SWIVEL LEVER LOCK (50EA/CA)

## (undated) DEVICE — GLOVE SZ 6 BIOGEL PI MICRO - PF LF (50PR/BX 4BX/CA)

## (undated) DEVICE — SYSTEM DISSECTION BALLOON  KII OVAL WITH 2 EACH 5MM LOW PROFILE TROCARS (3EA/BX)

## (undated) DEVICE — KIT RADIAL ARTERY 20GA W/MAX BARRIER AND BIOPATCH  (5EA/CA) #10740 IS FOR THE SET RADIAL ARTERIAL

## (undated) DEVICE — DRAPESURG STERI-DRAPE LONG - (10/BX 4BX/CA)

## (undated) DEVICE — Device

## (undated) DEVICE — ELECTRODE 5MM LHK LAPSCP STERILE DISP- MEGADYNE  (5/CA)

## (undated) DEVICE — INTRODUCER CATHETER  DILATOR PROTRUDING 8FR 2.5CM (10EA/BX)

## (undated) DEVICE — STOPCOCK IV 400 PSI 3W ROT (50EA/BX)

## (undated) DEVICE — COVER LIGHT HANDLE ALC PLUS DISP (18EA/BX)

## (undated) DEVICE — ARMBOARD  SMALL IV 9 INLONG - (25EA/CA)

## (undated) DEVICE — KIT NRG RF TRANSSEPTAL WITH STANDARD CURVE NEEDLE

## (undated) DEVICE — TRANSDUCER BIFURCATED MONITORING KIT (10EA/CA)

## (undated) DEVICE — DECANTER FLD BLS - (50/CA)

## (undated) DEVICE — COVER LIGHT HANDLE FLEXIBLE - SOFT (2EA/PK 80PK/CA)

## (undated) DEVICE — SUTURE 4-0 VICRYL PLUSFS-1 - 27 INCH (36/BX)

## (undated) DEVICE — SET TUBING PNEUMOCLEAR HIGH FLOW SMOKE EVACUATION (10EA/BX)

## (undated) DEVICE — SHEATH DRYSEAL FLEX INTRODUCER 16FR X 28CM

## (undated) DEVICE — GLOVE BIOGEL PI INDICATOR SZ 7.5 SURGICAL PF LF -(50/BX 4BX/CA)

## (undated) DEVICE — SCISSORS 5MM CVD (6EA/BX)

## (undated) DEVICE — GLOVE SZ 8 BIOGEL PI MICRO - PF LF (50PR/BX)

## (undated) DEVICE — CATHETER 6FR AL1 100CM (5/BX)

## (undated) DEVICE — DERMABOND ADVANCED - (12EA/BX)

## (undated) DEVICE — CATHETER INTRO 63CM 8.5FR SL1

## (undated) DEVICE — GLOVE BIOGEL SZ 7.5 SURGICAL PF LTX - (50PR/BX 4BX/CA)

## (undated) DEVICE — ELECTRODE RADIOLUCNT SOLID GEL DEFIB PADS (12EA/CA)

## (undated) DEVICE — SYS DLV COST CLS RM TEMP - INJECTATE (CO-SET II) (10EA/CA)

## (undated) DEVICE — TUBING PRSS MNTR 72IN M/ M LL - (25/BX) MONIT. LINE W/MALE L/L